# Patient Record
Sex: FEMALE | Race: BLACK OR AFRICAN AMERICAN | NOT HISPANIC OR LATINO | Employment: OTHER | URBAN - METROPOLITAN AREA
[De-identification: names, ages, dates, MRNs, and addresses within clinical notes are randomized per-mention and may not be internally consistent; named-entity substitution may affect disease eponyms.]

---

## 2020-10-05 ENCOUNTER — OFFICE VISIT (OUTPATIENT)
Dept: CARDIOLOGY | Facility: CLINIC | Age: 60
End: 2020-10-05
Payer: MEDICARE

## 2020-10-05 VITALS
HEART RATE: 81 BPM | SYSTOLIC BLOOD PRESSURE: 154 MMHG | OXYGEN SATURATION: 99 % | WEIGHT: 156 LBS | DIASTOLIC BLOOD PRESSURE: 76 MMHG

## 2020-10-05 DIAGNOSIS — E78.00 HYPERCHOLESTEROLEMIA: ICD-10-CM

## 2020-10-05 DIAGNOSIS — I65.22 STENOSIS OF LEFT CAROTID ARTERY: Primary | ICD-10-CM

## 2020-10-05 DIAGNOSIS — E11.59 TYPE 2 DIABETES MELLITUS WITH OTHER CIRCULATORY COMPLICATION, WITHOUT LONG-TERM CURRENT USE OF INSULIN: ICD-10-CM

## 2020-10-05 DIAGNOSIS — I10 ESSENTIAL HYPERTENSION: ICD-10-CM

## 2020-10-05 DIAGNOSIS — I25.118 ATHEROSCLEROSIS OF NATIVE CORONARY ARTERY OF NATIVE HEART WITH STABLE ANGINA PECTORIS: ICD-10-CM

## 2020-10-05 DIAGNOSIS — I70.209 ATHEROSCLEROTIC PERIPHERAL VASCULAR DISEASE: ICD-10-CM

## 2020-10-05 PROCEDURE — 99999 PR PBB SHADOW E&M-NEW PATIENT-LVL III: ICD-10-PCS | Mod: PBBFAC,,, | Performed by: INTERNAL MEDICINE

## 2020-10-05 PROCEDURE — 99214 PR OFFICE/OUTPT VISIT, EST, LEVL IV, 30-39 MIN: ICD-10-PCS | Mod: S$GLB,,, | Performed by: INTERNAL MEDICINE

## 2020-10-05 PROCEDURE — 99214 OFFICE O/P EST MOD 30 MIN: CPT | Mod: S$GLB,,, | Performed by: INTERNAL MEDICINE

## 2020-10-05 PROCEDURE — 99999 PR PBB SHADOW E&M-NEW PATIENT-LVL III: CPT | Mod: PBBFAC,,, | Performed by: INTERNAL MEDICINE

## 2020-10-05 RX ORDER — ASPIRIN 81 MG/1
81 TABLET ORAL DAILY
COMMUNITY

## 2020-10-05 RX ORDER — ZOLPIDEM TARTRATE 10 MG/1
10 TABLET ORAL DAILY
COMMUNITY
Start: 2020-09-23

## 2020-10-05 RX ORDER — LOSARTAN POTASSIUM AND HYDROCHLOROTHIAZIDE 12.5; 1 MG/1; MG/1
1 TABLET ORAL DAILY
COMMUNITY
Start: 2020-09-22

## 2020-10-05 RX ORDER — TIZANIDINE 4 MG/1
4 TABLET ORAL DAILY PRN
COMMUNITY
End: 2023-04-21

## 2020-10-05 RX ORDER — OXYCODONE AND ACETAMINOPHEN 10; 325 MG/1; MG/1
TABLET ORAL DAILY PRN
COMMUNITY
Start: 2020-09-25

## 2020-10-05 RX ORDER — ATORVASTATIN CALCIUM 40 MG/1
40 TABLET, FILM COATED ORAL DAILY
COMMUNITY
Start: 2019-11-22 | End: 2020-10-05 | Stop reason: SDUPTHER

## 2020-10-05 RX ORDER — ATORVASTATIN CALCIUM 40 MG/1
40 TABLET, FILM COATED ORAL DAILY
Qty: 90 TABLET | Refills: 3 | Status: SHIPPED | OUTPATIENT
Start: 2020-10-05 | End: 2022-10-07

## 2020-10-05 NOTE — PROGRESS NOTES
[facility]    Chief Complaint  Chief Complaint   Patient presents with    Coronary Artery Disease    Peripheral Arterial Disease       HPI:    Smoking again.  Not taking atorvastatin regularly.  But an stationary bicycle which she is using 3 or 4 days per week.  No angina.  No claudication.  Reports continued intolerance of atorvastatin because it makes her feel fatigued.    Medications  Current Outpatient Medications   Medication Sig Dispense Refill    aspirin (ECOTRIN) 81 MG EC tablet Take 81 mg by mouth once daily.      atorvastatin (LIPITOR) 40 MG tablet Take 40 mg by mouth once daily.      losartan-hydrochlorothiazide 100-12.5 mg (HYZAAR) 100-12.5 mg Tab Take 1 tablet by mouth once daily.      oxyCODONE-acetaminophen (PERCOCET)  mg per tablet daily as needed.      tiZANidine (ZANAFLEX) 4 MG tablet Take 4 mg by mouth daily as needed.      zolpidem (AMBIEN) 10 mg Tab Take 10 mg by mouth once daily.       No current facility-administered medications for this visit.       Prior to Admission medications    Medication Sig Start Date End Date Taking? Authorizing Provider   aspirin (ECOTRIN) 81 MG EC tablet Take 81 mg by mouth once daily.   Yes Historical Provider   atorvastatin (LIPITOR) 40 MG tablet Take 40 mg by mouth once daily. 11/22/19  Yes Historical Provider   losartan-hydrochlorothiazide 100-12.5 mg (HYZAAR) 100-12.5 mg Tab Take 1 tablet by mouth once daily. 9/22/20  Yes Historical Provider   oxyCODONE-acetaminophen (PERCOCET)  mg per tablet daily as needed. 9/25/20  Yes Historical Provider   tiZANidine (ZANAFLEX) 4 MG tablet Take 4 mg by mouth daily as needed.   Yes Historical Provider   zolpidem (AMBIEN) 10 mg Tab Take 10 mg by mouth once daily. 9/23/20  Yes Historical Provider       History  Past Medical History:   Diagnosis Date    Atherosclerotic peripheral vascular disease     Carotid stenosis     Coronary atherosclerosis     RCA-    Diabetes mellitus, type 2      Diverticulitis large intestine 05/2014    Hypercholesterolemia     Hypertension      Past Surgical History:   Procedure Laterality Date    ILIAC ARTERY STENT Left 02/24/2010    Maria T 8 x 29 mm    ILIAC ARTERY STENT Right 02/17/2010    Maria T 8 x 60 mm    TUBAL LIGATION      TYMPANOTOMY  11/2018     Social History     Socioeconomic History    Marital status:      Spouse name: Not on file    Number of children: Not on file    Years of education: Not on file    Highest education level: Not on file   Occupational History    Not on file   Social Needs    Financial resource strain: Not on file    Food insecurity     Worry: Not on file     Inability: Not on file    Transportation needs     Medical: Not on file     Non-medical: Not on file   Tobacco Use    Smoking status: Current Every Day Smoker    Smokeless tobacco: Never Used   Substance and Sexual Activity    Alcohol use: Yes    Drug use: Not on file    Sexual activity: Not on file   Lifestyle    Physical activity     Days per week: Not on file     Minutes per session: Not on file    Stress: Not on file   Relationships    Social connections     Talks on phone: Not on file     Gets together: Not on file     Attends Spiritism service: Not on file     Active member of club or organization: Not on file     Attends meetings of clubs or organizations: Not on file     Relationship status: Not on file   Other Topics Concern    Not on file   Social History Narrative    Not on file       Allergies  Review of patient's allergies indicates:  No Known Allergies    Review of Systems   Review of Systems   Constitution: Negative for malaise/fatigue, weight gain and weight loss.   Eyes: Negative for visual disturbance.   Cardiovascular: Negative for chest pain, claudication, cyanosis, dyspnea on exertion, irregular heartbeat, leg swelling, near-syncope, orthopnea, palpitations, paroxysmal nocturnal dyspnea and syncope.   Respiratory: Negative for  cough, hemoptysis, shortness of breath, sleep disturbances due to breathing and wheezing.    Hematologic/Lymphatic: Negative for bleeding problem. Does not bruise/bleed easily.   Skin: Negative for poor wound healing.   Musculoskeletal: Positive for back pain. Negative for muscle cramps and myalgias.   Gastrointestinal: Negative for abdominal pain, anorexia, diarrhea, heartburn, hematemesis, hematochezia, melena, nausea and vomiting.   Genitourinary: Negative for hematuria and nocturia.   Neurological: Negative for excessive daytime sleepiness, dizziness, focal weakness, light-headedness and weakness.       Physical Exam  Vitals:    10/05/20 1135   BP: (!) 154/76   Pulse: 81     Wt Readings from Last 1 Encounters:   10/05/20 70.8 kg (156 lb)     Physical Exam   Constitutional: She is oriented to person, place, and time. She is cooperative.  Non-toxic appearance. No distress.   HENT:   Head: Normocephalic and atraumatic.   Eyes: Conjunctivae are normal. No scleral icterus.   Neck: Neck supple. No hepatojugular reflux and no JVD present. No tracheal deviation present. No thyromegaly present.   Cardiovascular: Normal rate, regular rhythm, S1 normal and S2 normal.  No extrasystoles are present. PMI is not displaced. Exam reveals gallop and S4. Exam reveals no S3.   No murmur heard.  Pulses:       Carotid pulses are 2+ on the right side and 2+ on the left side with bruit.       Radial pulses are 2+ on the right side and 2+ on the left side.        Dorsalis pedis pulses are 1+ on the right side and 1+ on the left side.        Posterior tibial pulses are 1+ on the right side and 1+ on the left side.   Pulmonary/Chest: No accessory muscle usage. No respiratory distress. She has no decreased breath sounds. She has no wheezes. She has no rhonchi. She has no rales.   Abdominal: Soft. Bowel sounds are normal. She exhibits abdominal bruit. She exhibits no pulsatile liver and no pulsatile midline mass. There is no splenomegaly or  hepatomegaly. There is no abdominal tenderness.   Musculoskeletal:         General: No tenderness, deformity or edema.   Neurological: She is alert and oriented to person, place, and time. She has normal strength. No cranial nerve deficit or sensory deficit.   Skin: Skin is warm, dry and intact. She is not diaphoretic. No cyanosis. No pallor. Nails show no clubbing.   Psychiatric: She has a normal mood and affect. Her speech is normal and behavior is normal.       Labs  No visits with results within 6 Month(s) from this visit.   Latest known visit with results is:   No results found for any previous visit.       Imaging  No results found.    Assessment  1. Stenosis of left carotid artery  Asymptomatic  - CV Ultrasound Bilateral Doppler Carotid; Future    2. Atherosclerosis of native coronary artery of native heart with stable angina pectoris  Stable on medical therapy    3. Atherosclerotic peripheral vascular disease  Tolerable claudication    4. Essential hypertension  Controlled    5. Hypercholesterolemia  Has blood work ordered soon    6. Type 2 diabetes mellitus with other circulatory complication, without long-term current use of insulin  Management per primary      Plan and Discussion    Continue current guideline directed medical therapy.  Importance of smoking cessation was reinforced.  I asked her to take atorvastatin regular so that we could decide about adding alternative agent such as PCSK9 inhibitor.      Follow Up  Follow up in about 6 months (around 4/5/2021).      Maurizio Kraus MD

## 2021-01-15 ENCOUNTER — TELEPHONE (OUTPATIENT)
Dept: ADMINISTRATIVE | Facility: CLINIC | Age: 61
End: 2021-01-15

## 2021-01-15 ENCOUNTER — OFFICE VISIT (OUTPATIENT)
Dept: URGENT CARE | Facility: CLINIC | Age: 61
End: 2021-01-15
Payer: MEDICARE

## 2021-01-15 VITALS
OXYGEN SATURATION: 97 % | HEART RATE: 102 BPM | SYSTOLIC BLOOD PRESSURE: 148 MMHG | RESPIRATION RATE: 16 BRPM | TEMPERATURE: 98 F | DIASTOLIC BLOOD PRESSURE: 73 MMHG | WEIGHT: 156 LBS

## 2021-01-15 DIAGNOSIS — I10 HYPERTENSION, UNSPECIFIED TYPE: ICD-10-CM

## 2021-01-15 DIAGNOSIS — R43.0 LOSS OF SMELL: ICD-10-CM

## 2021-01-15 DIAGNOSIS — U07.1 COVID-19: Primary | ICD-10-CM

## 2021-01-15 LAB
CTP QC/QA: YES
SARS-COV-2 RDRP RESP QL NAA+PROBE: POSITIVE

## 2021-01-15 PROCEDURE — U0002: ICD-10-PCS | Mod: QW,S$GLB,, | Performed by: NURSE PRACTITIONER

## 2021-01-15 PROCEDURE — 99204 OFFICE O/P NEW MOD 45 MIN: CPT | Mod: S$GLB,,, | Performed by: NURSE PRACTITIONER

## 2021-01-15 PROCEDURE — U0002 COVID-19 LAB TEST NON-CDC: HCPCS | Mod: QW,S$GLB,, | Performed by: NURSE PRACTITIONER

## 2021-01-15 PROCEDURE — 99204 PR OFFICE/OUTPT VISIT, NEW, LEVL IV, 45-59 MIN: ICD-10-PCS | Mod: S$GLB,,, | Performed by: NURSE PRACTITIONER

## 2021-01-16 ENCOUNTER — TELEPHONE (OUTPATIENT)
Dept: ADMINISTRATIVE | Facility: CLINIC | Age: 61
End: 2021-01-16

## 2021-01-16 ENCOUNTER — PATIENT MESSAGE (OUTPATIENT)
Dept: ADMINISTRATIVE | Facility: OTHER | Age: 61
End: 2021-01-16

## 2021-01-17 ENCOUNTER — PATIENT MESSAGE (OUTPATIENT)
Dept: ADMINISTRATIVE | Facility: OTHER | Age: 61
End: 2021-01-17

## 2021-01-18 ENCOUNTER — PATIENT MESSAGE (OUTPATIENT)
Dept: ADMINISTRATIVE | Facility: OTHER | Age: 61
End: 2021-01-18

## 2021-01-19 ENCOUNTER — NURSE TRIAGE (OUTPATIENT)
Dept: ADMINISTRATIVE | Facility: CLINIC | Age: 61
End: 2021-01-19

## 2021-01-19 ENCOUNTER — PATIENT MESSAGE (OUTPATIENT)
Dept: ADMINISTRATIVE | Facility: OTHER | Age: 61
End: 2021-01-19

## 2021-01-20 ENCOUNTER — PATIENT MESSAGE (OUTPATIENT)
Dept: ADMINISTRATIVE | Facility: OTHER | Age: 61
End: 2021-01-20

## 2021-01-21 ENCOUNTER — PATIENT MESSAGE (OUTPATIENT)
Dept: ADMINISTRATIVE | Facility: OTHER | Age: 61
End: 2021-01-21

## 2021-01-22 ENCOUNTER — PATIENT MESSAGE (OUTPATIENT)
Dept: ADMINISTRATIVE | Facility: OTHER | Age: 61
End: 2021-01-22

## 2021-01-23 ENCOUNTER — NURSE TRIAGE (OUTPATIENT)
Dept: ADMINISTRATIVE | Facility: CLINIC | Age: 61
End: 2021-01-23

## 2021-01-23 ENCOUNTER — PATIENT MESSAGE (OUTPATIENT)
Dept: ADMINISTRATIVE | Facility: OTHER | Age: 61
End: 2021-01-23

## 2021-01-24 ENCOUNTER — PATIENT MESSAGE (OUTPATIENT)
Dept: ADMINISTRATIVE | Facility: OTHER | Age: 61
End: 2021-01-24

## 2021-01-25 ENCOUNTER — PATIENT MESSAGE (OUTPATIENT)
Dept: ADMINISTRATIVE | Facility: OTHER | Age: 61
End: 2021-01-25

## 2021-01-26 ENCOUNTER — PATIENT MESSAGE (OUTPATIENT)
Dept: ADMINISTRATIVE | Facility: OTHER | Age: 61
End: 2021-01-26

## 2021-01-27 ENCOUNTER — PATIENT MESSAGE (OUTPATIENT)
Dept: ADMINISTRATIVE | Facility: OTHER | Age: 61
End: 2021-01-27

## 2021-01-28 ENCOUNTER — PATIENT MESSAGE (OUTPATIENT)
Dept: ADMINISTRATIVE | Facility: OTHER | Age: 61
End: 2021-01-28

## 2021-01-29 ENCOUNTER — PATIENT MESSAGE (OUTPATIENT)
Dept: ADMINISTRATIVE | Facility: OTHER | Age: 61
End: 2021-01-29

## 2021-04-08 ENCOUNTER — OUTSIDE PLACE OF SERVICE (OUTPATIENT)
Dept: CARDIOLOGY | Facility: CLINIC | Age: 61
End: 2021-04-08
Payer: MEDICARE

## 2021-04-08 PROCEDURE — 93880 PR DUPLEX SCAN EXTRACRANIAL,BILAT: ICD-10-PCS | Mod: 26,,, | Performed by: INTERNAL MEDICINE

## 2021-04-08 PROCEDURE — 93880 EXTRACRANIAL BILAT STUDY: CPT | Mod: 26,,, | Performed by: INTERNAL MEDICINE

## 2021-04-13 ENCOUNTER — OFFICE VISIT (OUTPATIENT)
Dept: CARDIOLOGY | Facility: CLINIC | Age: 61
End: 2021-04-13
Payer: MEDICARE

## 2021-04-13 VITALS
SYSTOLIC BLOOD PRESSURE: 138 MMHG | WEIGHT: 160.5 LBS | DIASTOLIC BLOOD PRESSURE: 70 MMHG | HEART RATE: 91 BPM | OXYGEN SATURATION: 98 %

## 2021-04-13 DIAGNOSIS — I70.209 ATHEROSCLEROTIC PERIPHERAL VASCULAR DISEASE: ICD-10-CM

## 2021-04-13 DIAGNOSIS — I25.118 ATHEROSCLEROSIS OF NATIVE CORONARY ARTERY OF NATIVE HEART WITH STABLE ANGINA PECTORIS: ICD-10-CM

## 2021-04-13 DIAGNOSIS — I10 ESSENTIAL HYPERTENSION: ICD-10-CM

## 2021-04-13 DIAGNOSIS — I65.22 STENOSIS OF LEFT CAROTID ARTERY: ICD-10-CM

## 2021-04-13 DIAGNOSIS — E78.00 HYPERCHOLESTEROLEMIA: Primary | ICD-10-CM

## 2021-04-13 DIAGNOSIS — E11.59 TYPE 2 DIABETES MELLITUS WITH OTHER CIRCULATORY COMPLICATION, WITHOUT LONG-TERM CURRENT USE OF INSULIN: ICD-10-CM

## 2021-04-13 PROCEDURE — 99214 OFFICE O/P EST MOD 30 MIN: CPT | Mod: S$GLB,,, | Performed by: INTERNAL MEDICINE

## 2021-04-13 PROCEDURE — 1126F AMNT PAIN NOTED NONE PRSNT: CPT | Mod: S$GLB,,, | Performed by: INTERNAL MEDICINE

## 2021-04-13 PROCEDURE — 99999 PR PBB SHADOW E&M-EST. PATIENT-LVL III: CPT | Mod: PBBFAC,,, | Performed by: INTERNAL MEDICINE

## 2021-04-13 PROCEDURE — 1126F PR PAIN SEVERITY QUANTIFIED, NO PAIN PRESENT: ICD-10-PCS | Mod: S$GLB,,, | Performed by: INTERNAL MEDICINE

## 2021-04-13 PROCEDURE — 99999 PR PBB SHADOW E&M-EST. PATIENT-LVL III: ICD-10-PCS | Mod: PBBFAC,,, | Performed by: INTERNAL MEDICINE

## 2021-04-13 PROCEDURE — 99214 PR OFFICE/OUTPT VISIT, EST, LEVL IV, 30-39 MIN: ICD-10-PCS | Mod: S$GLB,,, | Performed by: INTERNAL MEDICINE

## 2021-04-13 RX ORDER — EZETIMIBE 10 MG/1
10 TABLET ORAL DAILY
Qty: 90 TABLET | Refills: 3 | Status: SHIPPED | OUTPATIENT
Start: 2021-04-13 | End: 2022-06-15

## 2021-04-26 ENCOUNTER — PATIENT MESSAGE (OUTPATIENT)
Dept: RESEARCH | Facility: HOSPITAL | Age: 61
End: 2021-04-26

## 2021-10-15 ENCOUNTER — OFFICE VISIT (OUTPATIENT)
Dept: CARDIOLOGY | Facility: CLINIC | Age: 61
End: 2021-10-15
Payer: MEDICARE

## 2021-10-15 ENCOUNTER — SPECIALTY PHARMACY (OUTPATIENT)
Dept: PHARMACY | Facility: CLINIC | Age: 61
End: 2021-10-15
Payer: MEDICARE

## 2021-10-15 VITALS
SYSTOLIC BLOOD PRESSURE: 118 MMHG | HEART RATE: 86 BPM | OXYGEN SATURATION: 97 % | DIASTOLIC BLOOD PRESSURE: 60 MMHG | WEIGHT: 157 LBS

## 2021-10-15 DIAGNOSIS — I70.209 ATHEROSCLEROTIC PERIPHERAL VASCULAR DISEASE: ICD-10-CM

## 2021-10-15 DIAGNOSIS — E78.00 HYPERCHOLESTEROLEMIA: ICD-10-CM

## 2021-10-15 DIAGNOSIS — E11.59 TYPE 2 DIABETES MELLITUS WITH OTHER CIRCULATORY COMPLICATION, WITHOUT LONG-TERM CURRENT USE OF INSULIN: ICD-10-CM

## 2021-10-15 DIAGNOSIS — I25.118 ATHEROSCLEROSIS OF NATIVE CORONARY ARTERY OF NATIVE HEART WITH STABLE ANGINA PECTORIS: ICD-10-CM

## 2021-10-15 DIAGNOSIS — I65.22 STENOSIS OF LEFT CAROTID ARTERY: Primary | ICD-10-CM

## 2021-10-15 DIAGNOSIS — I10 ESSENTIAL HYPERTENSION: ICD-10-CM

## 2021-10-15 PROCEDURE — 99999 PR PBB SHADOW E&M-EST. PATIENT-LVL III: ICD-10-PCS | Mod: PBBFAC,,, | Performed by: INTERNAL MEDICINE

## 2021-10-15 PROCEDURE — 99214 OFFICE O/P EST MOD 30 MIN: CPT | Mod: S$GLB,,, | Performed by: INTERNAL MEDICINE

## 2021-10-15 PROCEDURE — 3078F PR MOST RECENT DIASTOLIC BLOOD PRESSURE < 80 MM HG: ICD-10-PCS | Mod: CPTII,S$GLB,, | Performed by: INTERNAL MEDICINE

## 2021-10-15 PROCEDURE — 1160F RVW MEDS BY RX/DR IN RCRD: CPT | Mod: CPTII,S$GLB,, | Performed by: INTERNAL MEDICINE

## 2021-10-15 PROCEDURE — 3074F SYST BP LT 130 MM HG: CPT | Mod: CPTII,S$GLB,, | Performed by: INTERNAL MEDICINE

## 2021-10-15 PROCEDURE — 1159F PR MEDICATION LIST DOCUMENTED IN MEDICAL RECORD: ICD-10-PCS | Mod: CPTII,S$GLB,, | Performed by: INTERNAL MEDICINE

## 2021-10-15 PROCEDURE — 1160F PR REVIEW ALL MEDS BY PRESCRIBER/CLIN PHARMACIST DOCUMENTED: ICD-10-PCS | Mod: CPTII,S$GLB,, | Performed by: INTERNAL MEDICINE

## 2021-10-15 PROCEDURE — 99214 PR OFFICE/OUTPT VISIT, EST, LEVL IV, 30-39 MIN: ICD-10-PCS | Mod: S$GLB,,, | Performed by: INTERNAL MEDICINE

## 2021-10-15 PROCEDURE — 99999 PR PBB SHADOW E&M-EST. PATIENT-LVL III: CPT | Mod: PBBFAC,,, | Performed by: INTERNAL MEDICINE

## 2021-10-15 PROCEDURE — 3074F PR MOST RECENT SYSTOLIC BLOOD PRESSURE < 130 MM HG: ICD-10-PCS | Mod: CPTII,S$GLB,, | Performed by: INTERNAL MEDICINE

## 2021-10-15 PROCEDURE — 1159F MED LIST DOCD IN RCRD: CPT | Mod: CPTII,S$GLB,, | Performed by: INTERNAL MEDICINE

## 2021-10-15 PROCEDURE — 3078F DIAST BP <80 MM HG: CPT | Mod: CPTII,S$GLB,, | Performed by: INTERNAL MEDICINE

## 2021-10-20 ENCOUNTER — TELEPHONE (OUTPATIENT)
Dept: CARDIOLOGY | Facility: CLINIC | Age: 61
End: 2021-10-20

## 2021-10-25 ENCOUNTER — TELEPHONE (OUTPATIENT)
Dept: CARDIOLOGY | Facility: CLINIC | Age: 61
End: 2021-10-25
Payer: MEDICARE

## 2021-10-25 ENCOUNTER — CLINICAL SUPPORT (OUTPATIENT)
Dept: SMOKING CESSATION | Facility: CLINIC | Age: 61
End: 2021-10-25
Payer: COMMERCIAL

## 2021-10-25 DIAGNOSIS — F17.210 LIGHT CIGARETTE SMOKER (1-9 CIGS/DAY): Primary | ICD-10-CM

## 2021-10-25 PROCEDURE — 99404 PR PREVENT COUNSEL,INDIV,60 MIN: ICD-10-PCS | Mod: S$GLB,,,

## 2021-10-25 PROCEDURE — 99999 PR PBB SHADOW E&M-EST. PATIENT-LVL I: CPT | Mod: PBBFAC,,,

## 2021-10-25 PROCEDURE — 99999 PR PBB SHADOW E&M-EST. PATIENT-LVL I: ICD-10-PCS | Mod: PBBFAC,,,

## 2021-10-25 PROCEDURE — 99404 PREV MED CNSL INDIV APPRX 60: CPT | Mod: S$GLB,,,

## 2021-10-25 RX ORDER — CYCLOBENZAPRINE HCL 10 MG
10 TABLET ORAL 3 TIMES DAILY PRN
COMMUNITY
End: 2022-03-13

## 2021-10-25 RX ORDER — IBUPROFEN 200 MG
1 TABLET ORAL DAILY
Qty: 28 PATCH | Refills: 0 | Status: SHIPPED | OUTPATIENT
Start: 2021-10-25 | End: 2021-11-10 | Stop reason: SDUPTHER

## 2021-10-27 ENCOUNTER — CLINICAL SUPPORT (OUTPATIENT)
Dept: URGENT CARE | Facility: CLINIC | Age: 61
End: 2021-10-27
Payer: MEDICARE

## 2021-10-27 VITALS
HEART RATE: 91 BPM | BODY MASS INDEX: 27.82 KG/M2 | RESPIRATION RATE: 18 BRPM | OXYGEN SATURATION: 99 % | DIASTOLIC BLOOD PRESSURE: 60 MMHG | TEMPERATURE: 99 F | SYSTOLIC BLOOD PRESSURE: 128 MMHG | HEIGHT: 63 IN | WEIGHT: 157 LBS

## 2021-10-27 DIAGNOSIS — R35.0 URINARY FREQUENCY: ICD-10-CM

## 2021-10-27 DIAGNOSIS — N30.01 ACUTE CYSTITIS WITH HEMATURIA: Primary | ICD-10-CM

## 2021-10-27 DIAGNOSIS — R39.89 SENSATION OF PRESSURE IN BLADDER AREA: ICD-10-CM

## 2021-10-27 LAB
BILIRUB UR QL STRIP: NEGATIVE
GLUCOSE UR QL STRIP: NEGATIVE
KETONES UR QL STRIP: NEGATIVE
LEUKOCYTE ESTERASE UR QL STRIP: POSITIVE
PH, POC UA: 5.5 (ref 5–8)
POC BLOOD, URINE: POSITIVE
POC NITRATES, URINE: NEGATIVE
PROT UR QL STRIP: NEGATIVE
SP GR UR STRIP: 1.01 (ref 1–1.03)
UROBILINOGEN UR STRIP-ACNC: NORMAL (ref 0.1–1.1)

## 2021-10-27 PROCEDURE — 99213 PR OFFICE/OUTPT VISIT, EST, LEVL III, 20-29 MIN: ICD-10-PCS | Mod: 25,S$GLB,, | Performed by: NURSE PRACTITIONER

## 2021-10-27 PROCEDURE — 81003 POCT URINALYSIS, DIPSTICK, AUTOMATED, W/O SCOPE: ICD-10-PCS | Mod: QW,S$GLB,, | Performed by: NURSE PRACTITIONER

## 2021-10-27 PROCEDURE — 87077 CULTURE AEROBIC IDENTIFY: CPT | Performed by: NURSE PRACTITIONER

## 2021-10-27 PROCEDURE — 81003 URINALYSIS AUTO W/O SCOPE: CPT | Mod: QW,S$GLB,, | Performed by: NURSE PRACTITIONER

## 2021-10-27 PROCEDURE — 87088 URINE BACTERIA CULTURE: CPT | Performed by: NURSE PRACTITIONER

## 2021-10-27 PROCEDURE — 87086 URINE CULTURE/COLONY COUNT: CPT | Performed by: NURSE PRACTITIONER

## 2021-10-27 PROCEDURE — 87186 SC STD MICRODIL/AGAR DIL: CPT | Performed by: NURSE PRACTITIONER

## 2021-10-27 PROCEDURE — 99213 OFFICE O/P EST LOW 20 MIN: CPT | Mod: 25,S$GLB,, | Performed by: NURSE PRACTITIONER

## 2021-10-27 RX ORDER — PHENAZOPYRIDINE HYDROCHLORIDE 200 MG/1
200 TABLET, FILM COATED ORAL 3 TIMES DAILY PRN
Qty: 9 TABLET | Refills: 0 | Status: SHIPPED | OUTPATIENT
Start: 2021-10-27 | End: 2021-10-30

## 2021-10-27 RX ORDER — SULFAMETHOXAZOLE AND TRIMETHOPRIM 800; 160 MG/1; MG/1
1 TABLET ORAL 2 TIMES DAILY
Qty: 14 TABLET | Refills: 0 | Status: SHIPPED | OUTPATIENT
Start: 2021-10-27 | End: 2021-10-29

## 2021-10-29 ENCOUNTER — SPECIALTY PHARMACY (OUTPATIENT)
Dept: PHARMACY | Facility: CLINIC | Age: 61
End: 2021-10-29
Payer: MEDICARE

## 2021-10-29 ENCOUNTER — TELEPHONE (OUTPATIENT)
Dept: URGENT CARE | Facility: CLINIC | Age: 61
End: 2021-10-29
Payer: MEDICARE

## 2021-10-29 DIAGNOSIS — N30.00 ACUTE CYSTITIS WITHOUT HEMATURIA: Primary | ICD-10-CM

## 2021-10-29 LAB — BACTERIA UR CULT: ABNORMAL

## 2021-10-29 RX ORDER — NITROFURANTOIN 25; 75 MG/1; MG/1
100 CAPSULE ORAL 2 TIMES DAILY
Qty: 14 CAPSULE | Refills: 0 | Status: SHIPPED | OUTPATIENT
Start: 2021-10-29 | End: 2021-11-05

## 2021-10-30 ENCOUNTER — TELEPHONE (OUTPATIENT)
Dept: URGENT CARE | Facility: CLINIC | Age: 61
End: 2021-10-30
Payer: MEDICARE

## 2021-11-02 ENCOUNTER — TELEPHONE (OUTPATIENT)
Dept: CARDIOLOGY | Facility: CLINIC | Age: 61
End: 2021-11-02
Payer: MEDICARE

## 2021-11-03 ENCOUNTER — CLINICAL SUPPORT (OUTPATIENT)
Dept: SMOKING CESSATION | Facility: CLINIC | Age: 61
End: 2021-11-03
Payer: COMMERCIAL

## 2021-11-03 DIAGNOSIS — F17.210 CIGARETTE NICOTINE DEPENDENCE, UNCOMPLICATED: Primary | ICD-10-CM

## 2021-11-03 PROCEDURE — 99999 PR PBB SHADOW E&M-EST. PATIENT-LVL I: ICD-10-PCS | Mod: PBBFAC,,,

## 2021-11-03 PROCEDURE — 99407 BEHAV CHNG SMOKING > 10 MIN: CPT | Mod: S$GLB,,,

## 2021-11-03 PROCEDURE — 99407 PR TOBACCO USE CESSATION INTENSIVE >10 MINUTES: ICD-10-PCS | Mod: S$GLB,,,

## 2021-11-03 PROCEDURE — 99999 PR PBB SHADOW E&M-EST. PATIENT-LVL I: CPT | Mod: PBBFAC,,,

## 2021-11-10 ENCOUNTER — CLINICAL SUPPORT (OUTPATIENT)
Dept: SMOKING CESSATION | Facility: CLINIC | Age: 61
End: 2021-11-10
Payer: COMMERCIAL

## 2021-11-10 DIAGNOSIS — F17.210 LIGHT CIGARETTE SMOKER (1-9 CIGS/DAY): Primary | ICD-10-CM

## 2021-11-10 PROCEDURE — 99404 PR PREVENT COUNSEL,INDIV,60 MIN: ICD-10-PCS | Mod: S$GLB,,,

## 2021-11-10 PROCEDURE — 99404 PREV MED CNSL INDIV APPRX 60: CPT | Mod: S$GLB,,,

## 2021-11-10 RX ORDER — IBUPROFEN 200 MG
1 TABLET ORAL DAILY
Qty: 28 PATCH | Refills: 0 | Status: SHIPPED | OUTPATIENT
Start: 2021-11-10 | End: 2021-12-08 | Stop reason: SDUPTHER

## 2021-11-10 RX ORDER — MICONAZOLE NITRATE 2 %
2 CREAM (GRAM) TOPICAL
Qty: 170 EACH | Refills: 0 | Status: SHIPPED | OUTPATIENT
Start: 2021-11-10 | End: 2021-12-08 | Stop reason: SDUPTHER

## 2021-11-17 ENCOUNTER — CLINICAL SUPPORT (OUTPATIENT)
Dept: SMOKING CESSATION | Facility: CLINIC | Age: 61
End: 2021-11-17
Payer: COMMERCIAL

## 2021-11-17 ENCOUNTER — SPECIALTY PHARMACY (OUTPATIENT)
Dept: PHARMACY | Facility: CLINIC | Age: 61
End: 2021-11-17
Payer: MEDICARE

## 2021-11-17 DIAGNOSIS — F17.210 LIGHT CIGARETTE SMOKER (1-9 CIGS/DAY): Primary | ICD-10-CM

## 2021-11-17 DIAGNOSIS — E78.00 HYPERCHOLESTEROLEMIA: ICD-10-CM

## 2021-11-17 PROCEDURE — 99402 PREV MED CNSL INDIV APPRX 30: CPT | Mod: 25,S$GLB,,

## 2021-11-17 PROCEDURE — 99402 PR PREVENT COUNSEL,INDIV,30 MIN: ICD-10-PCS | Mod: 25,S$GLB,,

## 2021-11-17 RX ORDER — EVOLOCUMAB 140 MG/ML
140 INJECTION, SOLUTION SUBCUTANEOUS
Qty: 2 ML | Refills: 11 | Status: SHIPPED | OUTPATIENT
Start: 2021-11-17 | End: 2022-10-28 | Stop reason: SDUPTHER

## 2021-12-01 ENCOUNTER — TELEPHONE (OUTPATIENT)
Dept: SMOKING CESSATION | Facility: CLINIC | Age: 61
End: 2021-12-01
Payer: MEDICARE

## 2021-12-06 ENCOUNTER — TELEPHONE (OUTPATIENT)
Dept: SMOKING CESSATION | Facility: CLINIC | Age: 61
End: 2021-12-06
Payer: MEDICARE

## 2021-12-08 ENCOUNTER — TELEPHONE (OUTPATIENT)
Dept: SMOKING CESSATION | Facility: CLINIC | Age: 61
End: 2021-12-08
Payer: MEDICARE

## 2021-12-08 ENCOUNTER — CLINICAL SUPPORT (OUTPATIENT)
Dept: SMOKING CESSATION | Facility: CLINIC | Age: 61
End: 2021-12-08
Payer: COMMERCIAL

## 2021-12-08 DIAGNOSIS — F17.210 LIGHT CIGARETTE SMOKER (1-9 CIGS/DAY): Primary | ICD-10-CM

## 2021-12-08 PROCEDURE — 99402 PR PREVENT COUNSEL,INDIV,30 MIN: ICD-10-PCS | Mod: S$GLB,,,

## 2021-12-08 PROCEDURE — 99402 PREV MED CNSL INDIV APPRX 30: CPT | Mod: S$GLB,,,

## 2021-12-08 RX ORDER — MICONAZOLE NITRATE 2 %
2 CREAM (GRAM) TOPICAL
Qty: 170 EACH | Refills: 0 | Status: SHIPPED | OUTPATIENT
Start: 2021-12-08 | End: 2022-10-07

## 2021-12-08 RX ORDER — IBUPROFEN 200 MG
1 TABLET ORAL DAILY
Qty: 28 PATCH | Refills: 0 | Status: SHIPPED | OUTPATIENT
Start: 2021-12-08 | End: 2021-12-23 | Stop reason: SDUPTHER

## 2021-12-22 ENCOUNTER — SPECIALTY PHARMACY (OUTPATIENT)
Dept: PHARMACY | Facility: CLINIC | Age: 61
End: 2021-12-22
Payer: MEDICARE

## 2021-12-22 ENCOUNTER — CLINICAL SUPPORT (OUTPATIENT)
Dept: SMOKING CESSATION | Facility: CLINIC | Age: 61
End: 2021-12-22
Payer: COMMERCIAL

## 2021-12-22 DIAGNOSIS — F17.210 LIGHT CIGARETTE SMOKER (1-9 CIGS/DAY): Primary | ICD-10-CM

## 2021-12-22 PROCEDURE — 99403 PREV MED CNSL INDIV APPRX 45: CPT | Mod: S$GLB,,,

## 2021-12-22 PROCEDURE — 99403 PR PREVENT COUNSEL,INDIV,45 MIN: ICD-10-PCS | Mod: S$GLB,,,

## 2021-12-23 RX ORDER — IBUPROFEN 200 MG
1 TABLET ORAL DAILY
Qty: 28 PATCH | Refills: 0 | Status: SHIPPED | OUTPATIENT
Start: 2021-12-23 | End: 2022-10-07

## 2021-12-29 ENCOUNTER — SPECIALTY PHARMACY (OUTPATIENT)
Dept: PHARMACY | Facility: CLINIC | Age: 61
End: 2021-12-29
Payer: MEDICARE

## 2022-01-03 ENCOUNTER — TELEPHONE (OUTPATIENT)
Dept: SMOKING CESSATION | Facility: CLINIC | Age: 62
End: 2022-01-03
Payer: MEDICARE

## 2022-01-10 ENCOUNTER — TELEPHONE (OUTPATIENT)
Dept: SMOKING CESSATION | Facility: CLINIC | Age: 62
End: 2022-01-10
Payer: MEDICARE

## 2022-01-11 ENCOUNTER — CLINICAL SUPPORT (OUTPATIENT)
Dept: SMOKING CESSATION | Facility: CLINIC | Age: 62
End: 2022-01-11
Payer: COMMERCIAL

## 2022-01-11 ENCOUNTER — OFFICE VISIT (OUTPATIENT)
Dept: URGENT CARE | Facility: CLINIC | Age: 62
End: 2022-01-11
Payer: MEDICARE

## 2022-01-11 VITALS
WEIGHT: 160 LBS | OXYGEN SATURATION: 98 % | BODY MASS INDEX: 28.35 KG/M2 | DIASTOLIC BLOOD PRESSURE: 60 MMHG | SYSTOLIC BLOOD PRESSURE: 150 MMHG | TEMPERATURE: 98 F | RESPIRATION RATE: 18 BRPM | HEART RATE: 93 BPM | HEIGHT: 63 IN

## 2022-01-11 DIAGNOSIS — R05.9 COUGH: ICD-10-CM

## 2022-01-11 DIAGNOSIS — L42 PITYRIASIS ROSEA: Primary | ICD-10-CM

## 2022-01-11 DIAGNOSIS — R21 RASH: ICD-10-CM

## 2022-01-11 DIAGNOSIS — F17.210 LIGHT CIGARETTE SMOKER (1-9 CIGS/DAY): Primary | ICD-10-CM

## 2022-01-11 PROCEDURE — 1159F PR MEDICATION LIST DOCUMENTED IN MEDICAL RECORD: ICD-10-PCS | Mod: CPTII,S$GLB,, | Performed by: NURSE PRACTITIONER

## 2022-01-11 PROCEDURE — 3008F PR BODY MASS INDEX (BMI) DOCUMENTED: ICD-10-PCS | Mod: CPTII,S$GLB,, | Performed by: NURSE PRACTITIONER

## 2022-01-11 PROCEDURE — 99407 PR TOBACCO USE CESSATION INTENSIVE >10 MINUTES: ICD-10-PCS | Mod: S$GLB,,,

## 2022-01-11 PROCEDURE — 3008F BODY MASS INDEX DOCD: CPT | Mod: CPTII,S$GLB,, | Performed by: NURSE PRACTITIONER

## 2022-01-11 PROCEDURE — 3078F PR MOST RECENT DIASTOLIC BLOOD PRESSURE < 80 MM HG: ICD-10-PCS | Mod: CPTII,S$GLB,, | Performed by: NURSE PRACTITIONER

## 2022-01-11 PROCEDURE — 3078F DIAST BP <80 MM HG: CPT | Mod: CPTII,S$GLB,, | Performed by: NURSE PRACTITIONER

## 2022-01-11 PROCEDURE — 1160F RVW MEDS BY RX/DR IN RCRD: CPT | Mod: CPTII,S$GLB,, | Performed by: NURSE PRACTITIONER

## 2022-01-11 PROCEDURE — 99213 PR OFFICE/OUTPT VISIT, EST, LEVL III, 20-29 MIN: ICD-10-PCS | Mod: S$GLB,,, | Performed by: NURSE PRACTITIONER

## 2022-01-11 PROCEDURE — 1159F MED LIST DOCD IN RCRD: CPT | Mod: CPTII,S$GLB,, | Performed by: NURSE PRACTITIONER

## 2022-01-11 PROCEDURE — 99213 OFFICE O/P EST LOW 20 MIN: CPT | Mod: S$GLB,,, | Performed by: NURSE PRACTITIONER

## 2022-01-11 PROCEDURE — 3077F SYST BP >= 140 MM HG: CPT | Mod: CPTII,S$GLB,, | Performed by: NURSE PRACTITIONER

## 2022-01-11 PROCEDURE — 1160F PR REVIEW ALL MEDS BY PRESCRIBER/CLIN PHARMACIST DOCUMENTED: ICD-10-PCS | Mod: CPTII,S$GLB,, | Performed by: NURSE PRACTITIONER

## 2022-01-11 PROCEDURE — 99407 BEHAV CHNG SMOKING > 10 MIN: CPT | Mod: S$GLB,,,

## 2022-01-11 PROCEDURE — 3077F PR MOST RECENT SYSTOLIC BLOOD PRESSURE >= 140 MM HG: ICD-10-PCS | Mod: CPTII,S$GLB,, | Performed by: NURSE PRACTITIONER

## 2022-01-11 RX ORDER — HYDROXYZINE PAMOATE 25 MG/1
25 CAPSULE ORAL 4 TIMES DAILY
Qty: 28 CAPSULE | Refills: 0 | Status: SHIPPED | OUTPATIENT
Start: 2022-01-11 | End: 2022-01-18

## 2022-01-11 NOTE — PATIENT INSTRUCTIONS
"Patient Education       Pityriasis Rosea   The Basics   Written by the doctors and editors at [facility]   What is pityriasis rosea? -- Pityriasis rosea is a harmless skin rash that causes small, itchy spots on the belly, back, chest, arms, and legs. The rash usually lasts about 4 to 6 weeks, but in some people, it can last for months.  Pityriasis rosea is most common in older children and young adults.  What causes pityriasis rosea? -- The cause is not known. But it does not seem to be easily spread from person to person.  What are the symptoms of pityriasis rosea? -- In many people, the rash starts with one round or oval patch. This spot is about the size of a half dollar but might be larger. A day or two later, many smaller spots about the size of a dime appear. But not everyone gets the large spot before the rest of the rash.  If you have light skin, the spots are usually pink or salmon-colored. If you have dark skin, the spots can be a red-brown color or darker than your skin and .  The spots might be:  · On the belly, back, chest, arms, and legs  · Spread out in a "fir tree" or "Maya tree" pattern on the back  · Itchy  · A little scaly  In children, the spots sometimes happen on the face and scalp.  Is there a test for pityriasis rosea? -- Maybe. Your doctor or nurse will often be able to tell if you have it by learning about your symptoms and doing an exam. But they might gently scrape the rash or do a different test to get a sample of your skin. Tests on the skin sample can help the doctor tell if you have pityriasis rosea or a different disease.  Is there anything I can do on my own to feel better? -- Yes. You can:  · Take a special kind of bath called an oatmeal bath. Use lukewarm, not hot water.  · Use unscented moisturizing lotion or cream on your skin.  · Try to keep your body cool.  How is pityriasis rosea treated? -- Most people do not need any treatment. If the symptoms bother you, your doctor " "might prescribe creams or ointments to help with itching. In rare cases, doctors prescribe other medicines or a special type of treatment that uses lights, called "phototherapy."  All topics are updated as new evidence becomes available and our peer review process is complete.  This topic retrieved from [employer] on: Sep 21, 2021.  Topic 07274 Version 8.0  Release: 29.4.2 - C29.263  © 2021 UpToDate, Inc. and/or its affiliates. All rights reserved.  picture 1: Pityriasis rosea     Pityriasis rosea is a skin rash that sometimes starts with a single large patch (see arrow). Many smaller spots appear within a few days. In people with light skin, the spots are pink or salmon-colored. In people with dark skin, the spots are a red-brown color or darker than their skin.  (A) Courtesy of Vazquez Springer MD. (B) Reproduced with permission from: Mejia [employer]TS. Mejia's Photoguide of Common Skin Disorders, 2nd ed, Mack Aron&Larson, [place] 2003. Copyright © 2003 Mack Aron&Larson.  Graphic 00732 Version 2.0    Consumer Information Use and Disclaimer   This information is not specific medical advice and does not replace information you receive from your health care provider. This is only a brief summary of general information. It does NOT include all information about conditions, illnesses, injuries, tests, procedures, treatments, therapies, discharge instructions or life-style choices that may apply to you. You must talk with your health care provider for complete information about your health and treatment options. This information should not be used to decide whether or not to accept your health care provider's advice, instructions or recommendations. Only your health care provider has the knowledge and training to provide advice that is right for you. The use of this information is governed by the [employer] End User License Agreement, available at " https://www.Pronutriaer.com/en/solutions/lexicomp/about/laureen.The use of [employer] content is governed by the [employer] Terms of Use. ©2021 UpToDate, Inc. All rights reserved.  Copyright   © 2021 UpToDate, Inc. and/or its affiliates. All rights reserved.      Patients with FL and parents of children with FL typically want information about clinical course, infectivity, and relapse. We reassure patients and parents that FL typically spontaneously resolves within two to three months, is thought to have a low likelihood for transmission, and does not recur in most patients

## 2022-01-11 NOTE — PROGRESS NOTES
"Subjective:       Patient ID: Lina Farmer is a 61 y.o. female.    Vitals:  height is 5' 3" (1.6 m) and weight is 72.6 kg (160 lb). Her temperature is 98.3 °F (36.8 °C). Her blood pressure is 150/60 (abnormal) and her pulse is 93. Her respiration is 18 and oxygen saturation is 98%.     Chief Complaint: Rash    Patient has had a rash on stomach and back since Sunday. She also has a cough that started around kevin. The rash started on her stomach with one large patch and has spread since. The rash mildly itches. She has not changed any lotions, soaps, or laundry products. She did eat a new dip 4 days prior to the rash appear and started taking a new otc cough medicine 3 days prior x 2 doses. She has used oatmeal baths and calamine lotion for the rash with some relief.    Rash  This is a new problem. The current episode started in the past 7 days. The problem has been gradually worsening since onset. The affected locations include the abdomen and back. The rash is characterized by redness and itchiness. She was exposed to nothing. Pertinent negatives include no anorexia, congestion, cough, facial edema, fatigue, fever, joint pain, rhinorrhea, shortness of breath or sore throat. Past treatments include anti-itch cream.       Constitution: Negative for fatigue and fever.   HENT: Negative for congestion and sore throat.    Respiratory: Negative for cough and shortness of breath.    Skin: Positive for rash. Negative for erythema.       Objective:      Physical Exam   Constitutional: She is oriented to person, place, and time. She appears well-developed and well-nourished. She is cooperative.  Non-toxic appearance. She does not have a sickly appearance. She does not appear ill. No distress.   HENT:   Head: Normocephalic and atraumatic. Head is without abrasion, without contusion and without laceration.   Ears:   Right Ear: Hearing, tympanic membrane, external ear and ear canal normal.   Left Ear: Hearing, tympanic " membrane, external ear and ear canal normal.   Nose: Nose normal. No mucosal edema, rhinorrhea or nasal deformity. No epistaxis. Right sinus exhibits no maxillary sinus tenderness and no frontal sinus tenderness. Left sinus exhibits no maxillary sinus tenderness and no frontal sinus tenderness.   Mouth/Throat: Uvula is midline, oropharynx is clear and moist and mucous membranes are normal. No trismus in the jaw. Normal dentition. No uvula swelling. No oropharyngeal exudate, posterior oropharyngeal edema or posterior oropharyngeal erythema.   Eyes: Conjunctivae, EOM and lids are normal. Pupils are equal, round, and reactive to light. No scleral icterus.   Neck: Trachea normal and phonation normal. Neck supple. No edema present. No erythema present. No neck rigidity present.   Cardiovascular: Normal rate, regular rhythm, normal heart sounds, intact distal pulses and normal pulses.   Pulmonary/Chest: Effort normal and breath sounds normal. No stridor. No respiratory distress. She has no decreased breath sounds. She has no rhonchi.   Abdominal: Normal appearance.   Musculoskeletal: Normal range of motion.         General: No deformity or edema. Normal range of motion.   Neurological: She is alert and oriented to person, place, and time. She exhibits normal muscle tone. Coordination normal.   Skin: Skin is warm, dry, intact, not diaphoretic, not pale and rash (erythematous,  raised, mildly pruritic rash diffuse to anterior and posterior trunk, spreading to B upper arms and neck). Capillary refill takes less than 2 seconds. No abrasion, No burn, No bruising, No erythema and No ecchymosis   Psychiatric: She has a normal mood and affect. Her speech is normal and behavior is normal. Judgment and thought content normal. Cognition and memory  Nursing note and vitals reviewed.                       Assessment:       1. Pityriasis rosea    2. Cough    3. Rash          Plan:         Pityriasis rosea  -     hydrOXYzine pamoate  "(VISTARIL) 25 MG Cap; Take 1 capsule (25 mg total) by mouth 4 (four) times daily. for 7 days  Dispense: 28 capsule; Refill: 0    Cough  -     Cancel: POCT COVID-19 Rapid Screening    Rash      Patient Instructions   Patient Education       Pityriasis Rosea   The Basics   Written by the doctors and editors at [facility]   What is pityriasis rosea? -- Pityriasis rosea is a harmless skin rash that causes small, itchy spots on the belly, back, chest, arms, and legs. The rash usually lasts about 4 to 6 weeks, but in some people, it can last for months.  Pityriasis rosea is most common in older children and young adults.  What causes pityriasis rosea? -- The cause is not known. But it does not seem to be easily spread from person to person.  What are the symptoms of pityriasis rosea? -- In many people, the rash starts with one round or oval patch. This spot is about the size of a half dollar but might be larger. A day or two later, many smaller spots about the size of a dime appear. But not everyone gets the large spot before the rest of the rash.  If you have light skin, the spots are usually pink or salmon-colored. If you have dark skin, the spots can be a red-brown color or darker than your skin and .  The spots might be:  · On the belly, back, chest, arms, and legs  · Spread out in a "fir tree" or "Fletcher tree" pattern on the back  · Itchy  · A little scaly  In children, the spots sometimes happen on the face and scalp.  Is there a test for pityriasis rosea? -- Maybe. Your doctor or nurse will often be able to tell if you have it by learning about your symptoms and doing an exam. But they might gently scrape the rash or do a different test to get a sample of your skin. Tests on the skin sample can help the doctor tell if you have pityriasis rosea or a different disease.  Is there anything I can do on my own to feel better? -- Yes. You can:  · Take a special kind of bath called an oatmeal bath. Use lukewarm, not hot " "water.  · Use unscented moisturizing lotion or cream on your skin.  · Try to keep your body cool.  How is pityriasis rosea treated? -- Most people do not need any treatment. If the symptoms bother you, your doctor might prescribe creams or ointments to help with itching. In rare cases, doctors prescribe other medicines or a special type of treatment that uses lights, called "phototherapy."  All topics are updated as new evidence becomes available and our peer review process is complete.  This topic retrieved from [employer] on: Sep 21, 2021.  Topic 06628 Version 8.0  Release: 29.4.2 - C29.263  © 2021 UpToDate, Inc. and/or its affiliates. All rights reserved.  picture 1: Pityriasis rosea     Pityriasis rosea is a skin rash that sometimes starts with a single large patch (see arrow). Many smaller spots appear within a few days. In people with light skin, the spots are pink or salmon-colored. In people with dark skin, the spots are a red-brown color or darker than their skin.  (A) Courtesy of Vazquez Springer MD. (B) Reproduced with permission from: Mejia BETTS. Mejia's Photoguide of Common Skin Disorders, 2nd ed, Mack Aron&Larson, [place] 2003. Copyright © 2003 Mack Aron&Larson.  Graphic 97903 Version 2.0    Consumer Information Use and Disclaimer   This information is not specific medical advice and does not replace information you receive from your health care provider. This is only a brief summary of general information. It does NOT include all information about conditions, illnesses, injuries, tests, procedures, treatments, therapies, discharge instructions or life-style choices that may apply to you. You must talk with your health care provider for complete information about your health and treatment options. This information should not be used to decide whether or not to accept your health care provider's advice, instructions or recommendations. Only your health care provider has the " knowledge and training to provide advice that is right for you. The use of this information is governed by the [employer] End User License Agreement, available at https://www.[employer]rum [employer]ik/en/solutions/[employer]/about/laureen.The use of [employer] content is governed by the [employer] Terms of Use. ©2021 UpToDate, Inc. All rights reserved.  Copyright   © 2021 UpToDate, Inc. and/or its affiliates. All rights reserved.      Patients with SC and parents of children with SC typically want information about clinical course, infectivity, and relapse. We reassure patients and parents that SC typically spontaneously resolves within two to three months, is thought to have a low likelihood for transmission, and does not recur in most patients

## 2022-01-20 ENCOUNTER — SPECIALTY PHARMACY (OUTPATIENT)
Dept: PHARMACY | Facility: CLINIC | Age: 62
End: 2022-01-20
Payer: MEDICARE

## 2022-01-20 NOTE — TELEPHONE ENCOUNTER
Specialty Pharmacy - Refill Coordination    Specialty Medication Orders Linked to Encounter    Flowsheet Row Most Recent Value   Medication #1 evolocumab (REPATHA SURECLICK) 140 mg/mL PnIj (Order#[id number], Rx#[id number])          Refill Questions - Documented Responses    Flowsheet Row Most Recent Value   Patient Availability and HIPAA Verification    Does patient want to proceed with activity? Yes   HIPAA/medical authority confirmed? Yes   Relationship to patient of person spoken to? Self   Refill Screening Questions    Would patient like to speak to a pharmacist? No   When does the patient need to receive the medication? 01/26/22   Refill Delivery Questions    How will the patient receive the medication? Delivery Emily   When does the patient need to receive the medication? 01/26/22   Shipping Address Home   Address in [facility] confirmed and updated if neccessary? Yes   Expected Copay ($) 0   Is the patient able to afford the medication copay? Yes   Payment Method zero copay   Days supply of Refill 28   Supplies needed? No supplies needed   Refill activity completed? Yes   Refill activity plan Refill scheduled   Shipment/Pickup Date: 01/24/22          Current Outpatient Medications   Medication Sig    aspirin (ECOTRIN) 81 MG EC tablet Take 81 mg by mouth once daily.    atorvastatin (LIPITOR) 40 MG tablet Take 1 tablet (40 mg total) by mouth once daily. (Patient not taking: Reported on 4/13/2021)    cyclobenzaprine (FLEXERIL) 10 MG tablet Take 10 mg by mouth 3 (three) times daily as needed for Muscle spasms.    evolocumab (REPATHA SURECLICK) 140 mg/mL PnIj Inject 1 mL (140 mg total) into the skin every 14 (fourteen) days.    ezetimibe (ZETIA) 10 mg tablet Take 1 tablet (10 mg total) by mouth once daily.    losartan-hydrochlorothiazide 100-12.5 mg (HYZAAR) 100-12.5 mg Tab Take 1 tablet by mouth once daily.    nicotine (NICODERM CQ) 14 mg/24 hr Place 1 patch onto the skin once daily.    nicotine,  polacrilex, (NICORETTE) 2 mg Gum Take 1 each (2 mg total) by mouth as needed (use in place of cigarettes).    oxyCODONE-acetaminophen (PERCOCET)  mg per tablet daily as needed.    pulse oximeter (PULSE OXIMETER) device by Apply Externally route 2 (two) times a day. Use twice daily at 8 AM and 3 PM and record the value in MyChart as directed.    tiZANidine (ZANAFLEX) 4 MG tablet Take 4 mg by mouth daily as needed.    zolpidem (AMBIEN) 10 mg Tab Take 10 mg by mouth once daily.   Last reviewed on 1/11/2022  3:22 PM by Chelsea Asencio NP    Review of patient's allergies indicates:  No Known Allergies Last reviewed on  1/18/2022 4:43 PM by Ann Bejarano      Tasks added this encounter   2/16/2022 - Refill Call (Auto Added)   Tasks due within next 3 months   No tasks due.     [facility]  [address] [id number]  Phone: [phone]  Fax: [phone]

## 2022-01-25 ENCOUNTER — TELEPHONE (OUTPATIENT)
Dept: SMOKING CESSATION | Facility: CLINIC | Age: 62
End: 2022-01-25
Payer: MEDICARE

## 2022-02-09 ENCOUNTER — TELEPHONE (OUTPATIENT)
Dept: SMOKING CESSATION | Facility: CLINIC | Age: 62
End: 2022-02-09
Payer: MEDICARE

## 2022-02-16 ENCOUNTER — SPECIALTY PHARMACY (OUTPATIENT)
Dept: PHARMACY | Facility: CLINIC | Age: 62
End: 2022-02-16
Payer: MEDICARE

## 2022-02-22 ENCOUNTER — PATIENT MESSAGE (OUTPATIENT)
Dept: RESEARCH | Facility: HOSPITAL | Age: 62
End: 2022-02-22
Payer: MEDICARE

## 2022-03-10 ENCOUNTER — SPECIALTY PHARMACY (OUTPATIENT)
Dept: PHARMACY | Facility: CLINIC | Age: 62
End: 2022-03-10
Payer: MEDICARE

## 2022-03-10 NOTE — TELEPHONE ENCOUNTER
Specialty Pharmacy - Refill Coordination    Specialty Medication Orders Linked to Encounter    Flowsheet Row Most Recent Value   Medication #1 evolocumab (REPATHA SURECLICK) 140 mg/mL PnIj (Order#[id number], Rx#[id number])          Refill Questions - Documented Responses    Flowsheet Row Most Recent Value   Patient Availability and HIPAA Verification    Does patient want to proceed with activity? Yes   HIPAA/medical authority confirmed? Yes   Relationship to patient of person spoken to? Self   Refill Screening Questions    Would patient like to speak to a pharmacist? No   When does the patient need to receive the medication? 03/24/22   Refill Delivery Questions    How will the patient receive the medication? Delivery Emily   When does the patient need to receive the medication? 03/24/22   Shipping Address Home   Address in [facility] confirmed and updated if neccessary? Yes   Expected Copay ($) 0   Is the patient able to afford the medication copay? Yes   Payment Method zero copay   Days supply of Refill 28   Supplies needed? No supplies needed   Refill activity completed? Yes   Refill activity plan Refill scheduled   Shipment/Pickup Date: 03/16/22          Current Outpatient Medications   Medication Sig    aspirin (ECOTRIN) 81 MG EC tablet Take 81 mg by mouth once daily.    atorvastatin (LIPITOR) 40 MG tablet Take 1 tablet (40 mg total) by mouth once daily. (Patient not taking: Reported on 4/13/2021)    cyclobenzaprine (FLEXERIL) 10 MG tablet Take 10 mg by mouth 3 (three) times daily as needed for Muscle spasms.    evolocumab (REPATHA SURECLICK) 140 mg/mL PnIj Inject 1 mL (140 mg total) into the skin every 14 (fourteen) days.    ezetimibe (ZETIA) 10 mg tablet Take 1 tablet (10 mg total) by mouth once daily.    losartan-hydrochlorothiazide 100-12.5 mg (HYZAAR) 100-12.5 mg Tab Take 1 tablet by mouth once daily.    nicotine (NICODERM CQ) 14 mg/24 hr Place 1 patch onto the skin once daily.    nicotine,  polacrilex, (NICORETTE) 2 mg Gum Take 1 each (2 mg total) by mouth as needed (use in place of cigarettes).    oxyCODONE-acetaminophen (PERCOCET)  mg per tablet daily as needed.    pulse oximeter (PULSE OXIMETER) device by Apply Externally route 2 (two) times a day. Use twice daily at 8 AM and 3 PM and record the value in MyChart as directed.    tiZANidine (ZANAFLEX) 4 MG tablet Take 4 mg by mouth daily as needed.    zolpidem (AMBIEN) 10 mg Tab Take 10 mg by mouth once daily.   Last reviewed on 1/11/2022  3:22 PM by Chelsea Asencio NP    Review of patient's allergies indicates:  No Known Allergies Last reviewed on  1/18/2022 4:43 PM by Ann Bejarano      Tasks added this encounter   No tasks added.   Tasks due within next 3 months   3/17/2022 - Refill Call (Auto Added)     [facility] - Specialty Pharmacy  [address] [place], [place] [address]erson thai  [facility] [address]  Phone: [phone]  Fax: [phone]

## 2022-03-13 ENCOUNTER — HOSPITAL ENCOUNTER (EMERGENCY)
Facility: HOSPITAL | Age: 62
Discharge: HOME | End: 2022-03-13
Attending: EMERGENCY MEDICINE
Payer: MEDICARE

## 2022-03-13 VITALS
SYSTOLIC BLOOD PRESSURE: 159 MMHG | DIASTOLIC BLOOD PRESSURE: 63 MMHG | TEMPERATURE: 98 F | WEIGHT: 160 LBS | HEART RATE: 63 BPM | OXYGEN SATURATION: 99 % | BODY MASS INDEX: 28.34 KG/M2 | RESPIRATION RATE: 16 BRPM

## 2022-03-13 DIAGNOSIS — S60.222A CONTUSION OF LEFT HAND, INITIAL ENCOUNTER: Primary | ICD-10-CM

## 2022-03-13 DIAGNOSIS — M25.532 LEFT WRIST PAIN: ICD-10-CM

## 2022-03-13 DIAGNOSIS — R07.89 CHEST WALL PAIN: ICD-10-CM

## 2022-03-13 DIAGNOSIS — M79.632 LEFT FOREARM PAIN: ICD-10-CM
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
## 2022-03-13 PROCEDURE — 25000003 PHARM REV CODE 250: Performed by: EMERGENCY MEDICINE

## 2022-03-13 PROCEDURE — 99284 PR EMERGENCY DEPT VISIT,LEVEL IV: ICD-10-PCS | Mod: ,,, | Performed by: EMERGENCY MEDICINE

## 2022-03-13 PROCEDURE — 99284 EMERGENCY DEPT VISIT MOD MDM: CPT | Mod: 25

## 2022-03-13 PROCEDURE — 99284 EMERGENCY DEPT VISIT MOD MDM: CPT | Mod: ,,, | Performed by: EMERGENCY MEDICINE

## 2022-03-13 RX ORDER — NAPROXEN 500 MG/1
500 TABLET ORAL 2 TIMES DAILY WITH MEALS
Qty: 30 TABLET | Refills: 0 | Status: SHIPPED | OUTPATIENT
Start: 2022-03-13 | End: 2023-04-21

## 2022-03-13 RX ORDER — ACETAMINOPHEN 500 MG
1000 TABLET ORAL
Status: COMPLETED | OUTPATIENT
Start: 2022-03-13 | End: 2022-03-13

## 2022-03-13 RX ORDER — HYDROCODONE BITARTRATE AND ACETAMINOPHEN 5; 325 MG/1; MG/1
1 TABLET ORAL EVERY 8 HOURS PRN
Qty: 9 TABLET | Refills: 0 | Status: SHIPPED | OUTPATIENT
Start: 2022-03-13 | End: 2022-03-16

## 2022-03-13 RX ORDER — CYCLOBENZAPRINE HCL 10 MG
10 TABLET ORAL 2 TIMES DAILY PRN
Qty: 15 TABLET | Refills: 0 | Status: SHIPPED | OUTPATIENT
Start: 2022-03-13 | End: 2022-03-18

## 2022-03-13 RX ADMIN — ACETAMINOPHEN 1000 MG: 500 TABLET ORAL at 09:03

## 2022-03-14 NOTE — DISCHARGE INSTRUCTIONS
I recommend ice and elevation to your left hand. Your X-rays are negative but you have a contusion there.    I recommend Naproxen twice a day for your pain. You can take Tylenol in addition to this. I recommend ice to your sore back and then heat after 24 hours. You can also try Salon Pas 4% lidocaine patches available over the counter.  I have prescribed a muscle relaxer, Flexeril, and an opiate pain medication called Norco for more severe pain. Do not drive or drink alcohol while taking these medications. They can also make you unsteady on your feet and cause constipation    Follow up with your primary doctor for re-evaluation.  Seek immediate medical attention if you have new or worsening symptoms, or for any other concern.
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
## 2022-03-16 ENCOUNTER — TELEPHONE (OUTPATIENT)
Dept: CARDIOLOGY | Facility: CLINIC | Age: 62
End: 2022-03-16
Payer: MEDICARE

## 2022-03-16 NOTE — TELEPHONE ENCOUNTER
Returned call to patient. Told her that no labs were ordered.    ----- Message from Sadaf Harrison sent at 3/16/2022 11:41 AM CDT -----  Contact: [facility] [phone] ext [phone] ( Lory)  Ins would like a f/u call regarding pt upcoming appt

## 2022-04-18 ENCOUNTER — PATIENT MESSAGE (OUTPATIENT)
Dept: ADMINISTRATIVE | Facility: OTHER | Age: 62
End: 2022-04-18
Payer: MEDICARE

## 2022-04-22 ENCOUNTER — OFFICE VISIT (OUTPATIENT)
Dept: CARDIOLOGY | Facility: CLINIC | Age: 62
End: 2022-04-22
Payer: MEDICARE

## 2022-04-22 VITALS — HEART RATE: 67 BPM | DIASTOLIC BLOOD PRESSURE: 70 MMHG | OXYGEN SATURATION: 98 % | SYSTOLIC BLOOD PRESSURE: 154 MMHG

## 2022-04-22 DIAGNOSIS — I25.118 ATHEROSCLEROSIS OF NATIVE CORONARY ARTERY OF NATIVE HEART WITH STABLE ANGINA PECTORIS: Primary | ICD-10-CM

## 2022-04-22 DIAGNOSIS — E11.59 TYPE 2 DIABETES MELLITUS WITH OTHER CIRCULATORY COMPLICATION, WITHOUT LONG-TERM CURRENT USE OF INSULIN: ICD-10-CM

## 2022-04-22 DIAGNOSIS — I65.22 STENOSIS OF LEFT CAROTID ARTERY: ICD-10-CM

## 2022-04-22 DIAGNOSIS — I70.209 ATHEROSCLEROTIC PERIPHERAL VASCULAR DISEASE: ICD-10-CM

## 2022-04-22 DIAGNOSIS — I10 ESSENTIAL HYPERTENSION: ICD-10-CM

## 2022-04-22 DIAGNOSIS — E78.00 HYPERCHOLESTEROLEMIA: ICD-10-CM

## 2022-04-22 PROCEDURE — 3077F PR MOST RECENT SYSTOLIC BLOOD PRESSURE >= 140 MM HG: ICD-10-PCS | Mod: CPTII,S$GLB,, | Performed by: INTERNAL MEDICINE

## 2022-04-22 PROCEDURE — 1160F RVW MEDS BY RX/DR IN RCRD: CPT | Mod: CPTII,S$GLB,, | Performed by: INTERNAL MEDICINE

## 2022-04-22 PROCEDURE — 99999 PR PBB SHADOW E&M-EST. PATIENT-LVL IV: ICD-10-PCS | Mod: PBBFAC,,, | Performed by: INTERNAL MEDICINE

## 2022-04-22 PROCEDURE — 1160F PR REVIEW ALL MEDS BY PRESCRIBER/CLIN PHARMACIST DOCUMENTED: ICD-10-PCS | Mod: CPTII,S$GLB,, | Performed by: INTERNAL MEDICINE

## 2022-04-22 PROCEDURE — 3078F PR MOST RECENT DIASTOLIC BLOOD PRESSURE < 80 MM HG: ICD-10-PCS | Mod: CPTII,S$GLB,, | Performed by: INTERNAL MEDICINE

## 2022-04-22 PROCEDURE — 99214 OFFICE O/P EST MOD 30 MIN: CPT | Mod: S$GLB,,, | Performed by: INTERNAL MEDICINE

## 2022-04-22 PROCEDURE — 1159F MED LIST DOCD IN RCRD: CPT | Mod: CPTII,S$GLB,, | Performed by: INTERNAL MEDICINE

## 2022-04-22 PROCEDURE — 99214 PR OFFICE/OUTPT VISIT, EST, LEVL IV, 30-39 MIN: ICD-10-PCS | Mod: S$GLB,,, | Performed by: INTERNAL MEDICINE

## 2022-04-22 PROCEDURE — 99999 PR PBB SHADOW E&M-EST. PATIENT-LVL IV: CPT | Mod: PBBFAC,,, | Performed by: INTERNAL MEDICINE

## 2022-04-22 PROCEDURE — 1159F PR MEDICATION LIST DOCUMENTED IN MEDICAL RECORD: ICD-10-PCS | Mod: CPTII,S$GLB,, | Performed by: INTERNAL MEDICINE

## 2022-04-22 PROCEDURE — 3077F SYST BP >= 140 MM HG: CPT | Mod: CPTII,S$GLB,, | Performed by: INTERNAL MEDICINE

## 2022-04-22 PROCEDURE — 3078F DIAST BP <80 MM HG: CPT | Mod: CPTII,S$GLB,, | Performed by: INTERNAL MEDICINE

## 2022-04-22 NOTE — PROGRESS NOTES
[facility]    Chief Complaint  Chief Complaint   Patient presents with    Coronary Artery Disease       HPI:    Still working with smoking cessation but unable to quit.  Has now been diagnosed with back problems.  Undergoing treatment.  Walking for exercise.  No problems with exertional dyspnea or chest discomfort.    Medications  Current Outpatient Medications   Medication Sig Dispense Refill    aspirin (ECOTRIN) 81 MG EC tablet Take 81 mg by mouth once daily.      losartan-hydrochlorothiazide 100-12.5 mg (HYZAAR) 100-12.5 mg Tab Take 1 tablet by mouth once daily.      nicotine (NICODERM CQ) 14 mg/24 hr Place 1 patch onto the skin once daily. 28 patch 0    nicotine, polacrilex, (NICORETTE) 2 mg Gum Take 1 each (2 mg total) by mouth as needed (use in place of cigarettes). 170 each 0    oxyCODONE-acetaminophen (PERCOCET)  mg per tablet daily as needed.      tiZANidine (ZANAFLEX) 4 MG tablet Take 4 mg by mouth daily as needed.      zolpidem (AMBIEN) 10 mg Tab Take 10 mg by mouth once daily.      atorvastatin (LIPITOR) 40 MG tablet Take 1 tablet (40 mg total) by mouth once daily. (Patient not taking: Reported on 4/13/2021) 90 tablet 3    ezetimibe (ZETIA) 10 mg tablet Take 1 tablet (10 mg total) by mouth once daily. 90 tablet 3    naproxen (NAPROSYN) 500 MG tablet Take 1 tablet (500 mg total) by mouth 2 (two) times daily with meals. (Patient not taking: Reported on 4/22/2022) 30 tablet 0    pulse oximeter (PULSE OXIMETER) device by Apply Externally route 2 (two) times a day. Use twice daily at 8 AM and 3 PM and record the value in MyChart as directed. (Patient not taking: Reported on 4/22/2022) 1 each 0     No current facility-administered medications for this visit.        History  Past Medical History:   Diagnosis Date    Atherosclerotic peripheral vascular disease     Carotid stenosis     Coronary atherosclerosis     RCA-    Diabetes mellitus, type 2     Diverticulitis large  intestine 2014    Hypercholesterolemia     Hypertension      Past Surgical History:   Procedure Laterality Date    ILIAC ARTERY STENT Left 2010    Maria T 8 x 29 mm    ILIAC ARTERY STENT Right 2010    Maria T 8 x 60 mm    TUBAL LIGATION      TYMPANOTOMY  2018     Social History     Socioeconomic History    Marital status:    Tobacco Use    Smoking status: Current Every Day Smoker     Last attempt to quit: 2021     Years since quittin.4    Smokeless tobacco: Never Used   Substance and Sexual Activity    Alcohol use: Yes     Family History   Problem Relation Age of Onset    Coronary artery disease Mother     Coronary artery disease Father     Diabetes Father     Hypertension Sister     Hypertension Brother         Allergies  Review of patient's allergies indicates:  No Known Allergies    Review of Systems   Review of Systems   Constitutional: Negative for malaise/fatigue, weight gain and weight loss.   Eyes: Negative for visual disturbance.   Cardiovascular: Negative for chest pain, claudication, cyanosis, dyspnea on exertion, irregular heartbeat, leg swelling, near-syncope, orthopnea, palpitations, paroxysmal nocturnal dyspnea and syncope.   Respiratory: Negative for cough, hemoptysis, shortness of breath, sleep disturbances due to breathing and wheezing.    Hematologic/Lymphatic: Negative for bleeding problem. Does not bruise/bleed easily.   Skin: Negative for poor wound healing.   Musculoskeletal: Negative for muscle cramps and myalgias.   Gastrointestinal: Negative for abdominal pain, anorexia, diarrhea, heartburn, hematemesis, hematochezia, melena, nausea and vomiting.   Genitourinary: Negative for hematuria and nocturia.   Neurological: Negative for excessive daytime sleepiness, dizziness, focal weakness, light-headedness and weakness.       Physical Exam  Vitals:    22 1323   BP: (!) 154/70   Pulse: 67     Wt Readings from Last 1 Encounters:   22 (P)  72.9 kg (160 lb 11.5 oz)     Physical Exam  Constitutional:       General: She is not in acute distress.     Appearance: She is not toxic-appearing or diaphoretic.   HENT:      Head: Normocephalic and atraumatic.   Eyes:      General: No scleral icterus.     Conjunctiva/sclera: Conjunctivae normal.   Neck:      Thyroid: No thyromegaly.      Vascular: No hepatojugular reflux or JVD.      Trachea: No tracheal deviation.   Cardiovascular:      Rate and Rhythm: Normal rate and regular rhythm.  No extrasystoles are present.     Chest Wall: PMI is not displaced.      Pulses:           Carotid pulses are 2+ on the right side and 2+ on the left side with bruit.       Radial pulses are 2+ on the right side and 2+ on the left side.        Dorsalis pedis pulses are 1+ on the right side and 1+ on the left side.        Posterior tibial pulses are 1+ on the right side and 1+ on the left side.      Heart sounds: S1 normal and S2 normal. No murmur heard.    Gallop present. S4 sounds present. No S3 sounds.   Pulmonary:      Effort: No accessory muscle usage or respiratory distress.      Breath sounds: No decreased breath sounds, wheezing, rhonchi or rales.   Abdominal:      General: Bowel sounds are normal. There is abdominal bruit.      Palpations: Abdomen is soft. There is no hepatomegaly, splenomegaly or pulsatile mass.      Tenderness: There is no abdominal tenderness.   Musculoskeletal:         General: No tenderness or deformity.      Cervical back: Neck supple.   Skin:     General: Skin is warm and dry.      Coloration: Skin is not pale.      Nails: There is no clubbing.   Neurological:      Mental Status: She is alert and oriented to person, place, and time.      Cranial Nerves: No cranial nerve deficit.      Sensory: No sensory deficit.   Psychiatric:         Speech: Speech normal.         Behavior: Behavior normal. Behavior is cooperative.         Labs  Clinical Support on 10/27/2021   Component Date Value Ref Range Status     POC Blood, Urine 10/27/2021 Positive (A) Negative Final    50    POC Bilirubin, Urine 10/27/2021 Negative  Negative Final    POC Urobilinogen, Urine 10/27/2021 normal  0.1 - 1.1 Final    POC Ketones, Urine 10/27/2021 Negative  Negative Final    POC Protein, Urine 10/27/2021 Negative  Negative Final    POC Nitrates, Urine 10/27/2021 Negative  Negative Final    POC Glucose, Urine 10/27/2021 Negative  Negative Final    pH, UA 10/27/2021 5.5  5 - 8 Final    POC Specific Gravity, Urine 10/27/2021 1.015  1.003 - 1.029 Final    POC Leukocytes, Urine 10/27/2021 Positive (A) Negative Final    10    Urine Culture, Routine 10/27/2021  (A)  Final                    Value:ESCHERICHIA COLI  50,000 - 99,999 cfu/ml         Imaging  No results found.    Assessment  1. Atherosclerosis of native coronary artery of native heart with stable angina pectoris  Stable and free of angina    2. Atherosclerotic peripheral vascular disease  Non limiting.  Most of her leg pain seems to be related to her back.    3. Stenosis of left carotid artery  Time for reassessment  - CV Ultrasound Bilateral Doppler Carotid; Future at [facility] because of insurance    4. Essential hypertension  He did not take her medications today    5. Hypercholesterolemia  Tolerating Repatha    6. Type 2 diabetes mellitus with other circulatory complication, without long-term current use of insulin  Per primary      Plan and Discussion    Continue current guideline directed medical therapy.  Smoking cessation efforts encouraged.    The 10-year ASCVD risk score (Eaton SANCHEZ Jr., et al., 2013) is: 49.7%    Values used to calculate the score:      Age: 62 years      Sex: Female      Is Non- : Yes      Diabetic: Yes      Tobacco smoker: Yes      Systolic Blood Pressure: 154 mmHg      Is BP treated: Yes      HDL Cholesterol: 51 mg/dL      Total Cholesterol: 194 mg/dL     Follow Up  Follow up in about 6 months (around 10/22/2022).      Maurizio RIVERA  MD Efra

## 2022-04-25 ENCOUNTER — SPECIALTY PHARMACY (OUTPATIENT)
Dept: PHARMACY | Facility: CLINIC | Age: 62
End: 2022-04-25
Payer: MEDICARE

## 2022-04-25 NOTE — TELEPHONE ENCOUNTER
Specialty Pharmacy - Refill Coordination    Specialty Medication Orders Linked to Encounter    Flowsheet Row Most Recent Value   Medication #1 evolocumab (REPATHA SURECLICK) 140 mg/mL PnIj (Order#[id number], Rx#[id number])          Refill Questions - Documented Responses    Flowsheet Row Most Recent Value   Patient Availability and HIPAA Verification    Does patient want to proceed with activity? Yes   HIPAA/medical authority confirmed? Yes   Relationship to patient of person spoken to? Self   Refill Screening Questions    Would patient like to speak to a pharmacist? No   When does the patient need to receive the medication? 04/28/22   Refill Delivery Questions    How will the patient receive the medication? Delivery Emily   When does the patient need to receive the medication? 04/28/22   Shipping Address Home   Address in [facility] confirmed and updated if neccessary? Yes   Expected Copay ($) 0   Is the patient able to afford the medication copay? Yes   Payment Method zero copay   Days supply of Refill 28   Supplies needed? No supplies needed   Refill activity completed? Yes   Refill activity plan Refill scheduled   Shipment/Pickup Date: 04/27/22          Current Outpatient Medications   Medication Sig    aspirin (ECOTRIN) 81 MG EC tablet Take 81 mg by mouth once daily.    atorvastatin (LIPITOR) 40 MG tablet Take 1 tablet (40 mg total) by mouth once daily. (Patient not taking: Reported on 4/13/2021)    evolocumab (REPATHA SURECLICK) 140 mg/mL PnIj Inject 1 mL (140 mg total) into the skin every 14 (fourteen) days.    ezetimibe (ZETIA) 10 mg tablet Take 1 tablet (10 mg total) by mouth once daily.    losartan-hydrochlorothiazide 100-12.5 mg (HYZAAR) 100-12.5 mg Tab Take 1 tablet by mouth once daily.    naproxen (NAPROSYN) 500 MG tablet Take 1 tablet (500 mg total) by mouth 2 (two) times daily with meals. (Patient not taking: Reported on 4/22/2022)    nicotine (NICODERM CQ) 14 mg/24 hr Place 1 patch onto  the skin once daily.    nicotine, polacrilex, (NICORETTE) 2 mg Gum Take 1 each (2 mg total) by mouth as needed (use in place of cigarettes).    oxyCODONE-acetaminophen (PERCOCET)  mg per tablet daily as needed.    pulse oximeter (PULSE OXIMETER) device by Apply Externally route 2 (two) times a day. Use twice daily at 8 AM and 3 PM and record the value in MyChart as directed. (Patient not taking: Reported on 4/22/2022)    tiZANidine (ZANAFLEX) 4 MG tablet Take 4 mg by mouth daily as needed.    zolpidem (AMBIEN) 10 mg Tab Take 10 mg by mouth once daily.   Last reviewed on 4/22/2022  1:28 PM by Maurizio Kraus MD    Review of patient's allergies indicates:  No Known Allergies Last reviewed on  4/22/2022 1:28 PM by Maurizio Kraus      Tasks added this encounter   No tasks added.   Tasks due within next 3 months   4/14/2022 - Refill Call (Auto Added)     [facility]  [address]  [facility] [address]  Phone: [phone]  Fax: [phone]

## 2022-05-15 ENCOUNTER — HOSPITAL ENCOUNTER (EMERGENCY)
Facility: HOSPITAL | Age: 62
Discharge: HOME | End: 2022-05-15
Attending: EMERGENCY MEDICINE
Payer: MEDICARE

## 2022-05-15 ENCOUNTER — NURSE TRIAGE (OUTPATIENT)
Dept: ADMINISTRATIVE | Facility: CLINIC | Age: 62
End: 2022-05-15
Payer: MEDICARE

## 2022-05-15 VITALS
RESPIRATION RATE: 18 BRPM | OXYGEN SATURATION: 97 % | BODY MASS INDEX: 27.99 KG/M2 | TEMPERATURE: 98 F | WEIGHT: 158 LBS | SYSTOLIC BLOOD PRESSURE: 193 MMHG | HEART RATE: 94 BPM | DIASTOLIC BLOOD PRESSURE: 78 MMHG

## 2022-05-15 DIAGNOSIS — R05.9 COUGH: ICD-10-CM

## 2022-05-15 PROCEDURE — 99284 PR EMERGENCY DEPT VISIT,LEVEL IV: ICD-10-PCS | Mod: ,,, | Performed by: EMERGENCY MEDICINE

## 2022-05-15 PROCEDURE — 99283 EMERGENCY DEPT VISIT LOW MDM: CPT | Mod: 25

## 2022-05-15 PROCEDURE — 99284 EMERGENCY DEPT VISIT MOD MDM: CPT | Mod: ,,, | Performed by: EMERGENCY MEDICINE

## 2022-05-15 PROCEDURE — 25000003 PHARM REV CODE 250: Performed by: EMERGENCY MEDICINE

## 2022-05-15 RX ORDER — BENZONATATE 100 MG/1
100 CAPSULE ORAL 3 TIMES DAILY PRN
Qty: 20 CAPSULE | Refills: 0 | Status: SHIPPED | OUTPATIENT
Start: 2022-05-15 | End: 2022-05-25

## 2022-05-15 RX ORDER — BENZONATATE 100 MG/1
100 CAPSULE ORAL
Status: COMPLETED | OUTPATIENT
Start: 2022-05-15 | End: 2022-05-15

## 2022-05-15 RX ADMIN — BENZONATATE 100 MG: 100 CAPSULE ORAL at 09:05

## 2022-05-15 NOTE — TELEPHONE ENCOUNTER
Reason for Disposition   Patient sounds very sick or weak to the triager    Additional Information   Negative: Bluish (or gray) lips or face now   Negative: SEVERE difficulty breathing (e.g., struggling for each breath, speaks in single words)   Negative: Difficult to awaken or acting confused (e.g., disoriented, slurred speech)   Negative: Shock suspected (e.g., cold/pale/clammy skin, too weak to stand, low BP, rapid pulse)   Negative: Sounds like a life-threatening emergency to the triager   Negative: [1] Symptoms of COVID-19 (e.g., cough, fever, SOB, or others) AND [2] lives in an area with community spread   Negative: [1] Sounds like a cold AND [2] no fever   Negative: [1] Cough with sputum AND [2] no fever   Negative: [1] Dry cough (no sputum) sputum AND [2] no fever   Negative: [1] Throat pain AND [2] no fever   Negative: Influenza vaccine reaction is suspected   Negative: Chest pain  (Exception: MILD central chest pain, present only when coughing)   Negative: [1] Headache AND [2] stiff neck (can't touch chin to chest)   Negative: Fever > 104 F (40 C)   Negative: [1] Difficulty breathing AND [2] not severe AND [3] not from stuffy nose (e.g., not relieved by cleaning out the nose)    Protocols used: INFLUENZA - SEASONAL-A-  pt called re x 2 days coughing and throat hurting , afeb. around someone with the flu. hx tob. cant stop coughing. denies asthma. Pt admits to feeling very sick. rec ED now.pt agrees. Call back with questions.

## 2022-05-16 NOTE — ED TRIAGE NOTES
Lina Farmer, a 62 y.o. female presents to the ED w/ complaint of cough, exposure to unknown virus (not covid or flu) from grandson. Pt stated elevated B/P r/t cough. Also c/o fatigue. Denies fever, chills, sweats.     Triage note:  Chief Complaint   Patient presents with    Cough     Pt c/o of persistent dry cough x 2 days. Denies fever, chills, or sore throat     Review of patient's allergies indicates:  No Known Allergies  Past Medical History:   Diagnosis Date    Atherosclerotic peripheral vascular disease     Carotid stenosis     Coronary atherosclerosis     RCA-    Diabetes mellitus, type 2     Diverticulitis large intestine 05/2014    Hypercholesterolemia     Hypertension      Patient identifiers verified verbally with patient and correct for Lina Farmer.    LOC/ APPEARANCE: The patient is AAOx4. Pt is speaking appropriately, no slurred speech. Pt reports pain level of 0.  SKIN: Skin is warm dry and intact, and color is consistent with ethnicity.   RESPIRATORY: Airway is open and patent. Respirations-spontaneous, unlabored, regular rate, equal bilaterally on inspiration and expiration. No accessory muscle use noted. Positive for non-productive cough.   CARDIAC: Patient has regular heart rate.  No peripheral edema noted. Positive for chest pain with coughing.    ABDOMEN: Soft and non-tender to palpation with no distention noted. Pt has no complaints of abnormal bowel movements, denies blood in stool. Pt reports normal appetite.   NEUROLOGIC: Eyes open spontaneously and facial expression symmetrical. Pt behavior appropriate to situation, and pt follows commands. Pt reports sensation present in all extremities when touched with a finger, denies any numbness or tingling.   MUSCULOSKELETAL: Spontaneous movement noted to all extremities. Hand  equal and leg strength strong +5 bilaterally.   : No complaints of frequency, burning, urgency or blood in the urine. No complaints of  incontinence.

## 2022-05-16 NOTE — ED PROVIDER NOTES
SCRIBE #1 NOTE: I, Giovanna Anna, am scribing for, and in the presence of,  Scar York MD. I have scribed the following portions of the note: HPI, ROS, PE.     Source of History:  pt    Chief complaint:  Cough (Pt c/o of persistent dry cough x 2 days. Denies fever, chills, or sore throat)      HPI:  Lina Farmer is a 62 y.o. female presenting with a dry cough with onset 2 days ago. She reports the coughing hurts her chest and she was unable to sleep last night because of it. Her niece recently had the flu and her grandson recently had a cough and she was taking care of him. She took him to the hospital and his COVID-19 and flu tests were negative. A few years ago she took some OTC cough syrup which increased her blood pressure and was told to start using cough syrup specifically for people with HTN which she has been using with no improvement to her symptoms. She has also been taking Halls cough drops and thinks it is elevating her blood pressure. Her pressure is normally 120/60 but she forgot to take her medicine this morning. She took it about an hour ago. She reports she had bronchitis years ago but denies any other lung history. She has been smoking 0.5 packs of cigarettes/day for years and quit cold turkey today.    ROS: As per HPI and below:    General: No fever.  No chills.  Eyes: No visual changes.  Head: No headache.    Integument: No rashes or lesions.  Chest: No shortness of breath. + Cough  Cardiovascular: + Chest pain.  Abdomen: No abdominal pain.  No nausea or vomiting.  Urinary: No abnormal urination.  Neurologic: No focal weakness.  No numbness.  Hematologic: No easy bruising.  Endocrine: No excessive thirst or urination.      Review of patient's allergies indicates:  No Known Allergies    No current facility-administered medications on file prior to encounter.     Current Outpatient Medications on File Prior to Encounter   Medication Sig Dispense Refill    aspirin (ECOTRIN) 81 MG EC  tablet Take 81 mg by mouth once daily.      atorvastatin (LIPITOR) 40 MG tablet Take 1 tablet (40 mg total) by mouth once daily. (Patient not taking: Reported on 4/13/2021) 90 tablet 3    evolocumab (REPATHA SURECLICK) 140 mg/mL PnIj Inject 1 mL (140 mg total) into the skin every 14 (fourteen) days. 2 mL 11    ezetimibe (ZETIA) 10 mg tablet Take 1 tablet (10 mg total) by mouth once daily. 90 tablet 3    losartan-hydrochlorothiazide 100-12.5 mg (HYZAAR) 100-12.5 mg Tab Take 1 tablet by mouth once daily.      naproxen (NAPROSYN) 500 MG tablet Take 1 tablet (500 mg total) by mouth 2 (two) times daily with meals. (Patient not taking: Reported on 4/22/2022) 30 tablet 0    nicotine (NICODERM CQ) 14 mg/24 hr Place 1 patch onto the skin once daily. 28 patch 0    nicotine, polacrilex, (NICORETTE) 2 mg Gum Take 1 each (2 mg total) by mouth as needed (use in place of cigarettes). 170 each 0    oxyCODONE-acetaminophen (PERCOCET)  mg per tablet daily as needed.      pulse oximeter (PULSE OXIMETER) device by Apply Externally route 2 (two) times a day. Use twice daily at 8 AM and 3 PM and record the value in MyChart as directed. (Patient not taking: Reported on 4/22/2022) 1 each 0    tiZANidine (ZANAFLEX) 4 MG tablet Take 4 mg by mouth daily as needed.      zolpidem (AMBIEN) 10 mg Tab Take 10 mg by mouth once daily.         PMH:  As per HPI and below:  Past Medical History:   Diagnosis Date    Atherosclerotic peripheral vascular disease     Carotid stenosis     Coronary atherosclerosis     RCA-    Diabetes mellitus, type 2     Diverticulitis large intestine 05/2014    Hypercholesterolemia     Hypertension      Past Surgical History:   Procedure Laterality Date    ILIAC ARTERY STENT Left 02/24/2010    Maria T 8 x 29 mm    ILIAC ARTERY STENT Right 02/17/2010    Maria T 8 x 60 mm    TUBAL LIGATION      TYMPANOTOMY  11/2018       Social History     Socioeconomic History    Marital status:     Tobacco Use    Smoking status: Current Every Day Smoker     Last attempt to quit: 2021     Years since quittin.5    Smokeless tobacco: Never Used   Substance and Sexual Activity    Alcohol use: Yes       Family History   Problem Relation Age of Onset    Coronary artery disease Mother     Coronary artery disease Father     Diabetes Father     Hypertension Sister     Hypertension Brother        Physical Exam:    Vitals:    05/15/22 1859   BP: (!) 193/78   Pulse: 94   Resp: 18   Temp: 98.3 °F (36.8 °C)     Appearance: No acute distress.  Skin: No rashes seen.  Good turgor.  No abrasions.  No ecchymoses.  Eyes: No conjunctival injection. EOMI, PERRL.  ENT: Oropharynx clear.    Chest:  No increased work of breathing, bilateral chest rise.  Cardiovascular: Regular rate and rhythm.  Normal equal bilateral radial pulses.  Abdomen: Soft.  Not distended.  Nontender.  No guarding.  No rebound. No Masses  Musculoskeletal: Good range of motion all joints.  No deformities.  Neck supple, full range of motion, no obvious deformity.  Neurologic: Moves all extremities.  Normal sensation.  No facial droop.  Normal speech.    Mental Status:  Alert and oriented x 3.  Appropriate, conversant.          Laboratory Studies:  Labs Reviewed - No data to display      I independently interpreted the CXR:  No pneumonia or infiltrate    Imaging Results          X-Ray Chest 1 View (Final result)  Result time 05/15/22 22:08:32    Final result by Juanito Boggs MD (05/15/22 22:08:32)                 Impression:      Right perihilar airspace opacity.      Electronically signed by: Juanito Boggs MD  Date:    05/15/2022  Time:    22:08             Narrative:    EXAMINATION:  XR CHEST 1 VIEW    CLINICAL HISTORY:  Cough, unspecified    TECHNIQUE:  Single frontal view of the chest was performed.    COMPARISON:  2022.    FINDINGS:  There are postoperative changes in the cervical spine.  The trachea is unremarkable.  The  cardiomediastinal silhouette is within normal limits.  The hemidiaphragms are unremarkable.  There are no pleural effusions.  There is no evidence of a pneumothorax.  There is no evidence of pneumomediastinum.  There is a right perihilar airspace opacity.  There are degenerative changes in the osseous structures.                                Medications Given:  Medications   benzonatate capsule 100 mg (100 mg Oral Given 5/15/22 2139)       Discussed with: pt    MDM:    62 y.o. female with 2 days cough after exposure to her grandson who has a viral illness.  Her vitals are stable, she is afebrile.  I do not suspect pneumonia, pneumothorax, pulmonary effusion with sepsis.  Chest x-ray is reassuring, there is mention of airspace opacity, however it is inconsistent with pneumonia.  given Tessalon in the ED.  Will prescribe Tessalon for home as well.  Counseled on cessation of smoking.  Advised pt to follow up with PCP or return if concerning symptoms arise. Pt understands and agrees with plan. Will d/c home.          Diagnostic Impression:    1. Cough            Scribe Attestation:  Scribe #1: I performed the above scribed service and the documentation accurately describes the services I performed. I attest to the accuracy of the note.     Scar York MD  05/15/22 3653

## 2022-05-19 ENCOUNTER — SPECIALTY PHARMACY (OUTPATIENT)
Dept: PHARMACY | Facility: CLINIC | Age: 62
End: 2022-05-19
Payer: MEDICARE

## 2022-05-24 ENCOUNTER — TELEPHONE (OUTPATIENT)
Dept: SMOKING CESSATION | Facility: CLINIC | Age: 62
End: 2022-05-24
Payer: MEDICARE

## 2022-06-16 ENCOUNTER — SPECIALTY PHARMACY (OUTPATIENT)
Dept: PHARMACY | Facility: CLINIC | Age: 62
End: 2022-06-16
Payer: MEDICARE

## 2022-06-16 NOTE — TELEPHONE ENCOUNTER
Specialty Pharmacy - Refill Coordination    Specialty Medication Orders Linked to Encounter    Flowsheet Row Most Recent Value   Medication #1 evolocumab (REPATHA SURECLICK) 140 mg/mL PnIj (Order#[id number], Rx#[id number])          Refill Questions - Documented Responses    Flowsheet Row Most Recent Value   Patient Availability and HIPAA Verification    Does patient want to proceed with activity? Yes   HIPAA/medical authority confirmed? Yes   Relationship to patient of person spoken to? Self   Refill Screening Questions    Would patient like to speak to a pharmacist? No   When does the patient need to receive the medication? 06/20/22   Refill Delivery Questions    How will the patient receive the medication? Delivery Emily   When does the patient need to receive the medication? 06/20/22   Shipping Address Home   Address in [facility] confirmed and updated if neccessary? Yes   Expected Copay ($) 0   Is the patient able to afford the medication copay? Yes   Payment Method zero copay   Days supply of Refill 28   Supplies needed? No supplies needed   Refill activity completed? Yes   Refill activity plan Refill scheduled   Shipment/Pickup Date: 06/20/22          Current Outpatient Medications   Medication Sig    aspirin (ECOTRIN) 81 MG EC tablet Take 81 mg by mouth once daily.    atorvastatin (LIPITOR) 40 MG tablet Take 1 tablet (40 mg total) by mouth once daily. (Patient not taking: Reported on 4/13/2021)    evolocumab (REPATHA SURECLICK) 140 mg/mL PnIj Inject 1 mL (140 mg total) into the skin every 14 (fourteen) days.    ezetimibe (ZETIA) 10 mg tablet TAKE 1 TABLET(10 MG) BY MOUTH EVERY DAY    losartan-hydrochlorothiazide 100-12.5 mg (HYZAAR) 100-12.5 mg Tab Take 1 tablet by mouth once daily.    naproxen (NAPROSYN) 500 MG tablet Take 1 tablet (500 mg total) by mouth 2 (two) times daily with meals. (Patient not taking: Reported on 4/22/2022)    nicotine (NICODERM CQ) 14 mg/24 hr Place 1 patch onto the skin  once daily.    nicotine, polacrilex, (NICORETTE) 2 mg Gum Take 1 each (2 mg total) by mouth as needed (use in place of cigarettes).    oxyCODONE-acetaminophen (PERCOCET)  mg per tablet daily as needed.    pulse oximeter (PULSE OXIMETER) device by Apply Externally route 2 (two) times a day. Use twice daily at 8 AM and 3 PM and record the value in MyChart as directed. (Patient not taking: Reported on 4/22/2022)    tiZANidine (ZANAFLEX) 4 MG tablet Take 4 mg by mouth daily as needed.    zolpidem (AMBIEN) 10 mg Tab Take 10 mg by mouth once daily.   Last reviewed on 5/15/2022  9:00 PM by Perri Schneider RN    Review of patient's allergies indicates:  No Known Allergies Last reviewed on  5/15/2022 9:00 PM by Perri Schneider      Tasks added this encounter   7/11/2022 - Refill Call (Auto Added)   Tasks due within next 3 months   No tasks due.     Zandra Thomas, Patient Care Assistant  [facility]  [address]  [facility] [address]  Phone: [phone]  Fax: [phone]

## 2022-06-23 ENCOUNTER — TELEPHONE (OUTPATIENT)
Dept: CARDIOLOGY | Facility: CLINIC | Age: 62
End: 2022-06-23
Payer: MEDICARE

## 2022-06-23 DIAGNOSIS — I65.22 STENOSIS OF LEFT CAROTID ARTERY: Primary | ICD-10-CM

## 2022-06-23 NOTE — TELEPHONE ENCOUNTER
Returned call to patient. Rescheduled appointment for Carotid U/S.    ----- Message from Denice Magana sent at 6/23/2022 11:37 AM CDT -----  Regarding: Patient call back  Who called:ANNALISA MCCORMACK [[id number]]    What is the request in detail: Patient is requesting a call back. She states she would like to discuss scheduling some tests.   Please advise.    Can the clinic reply by [facility]? No    Best call back number: [phone]    Additional Information: N/A

## 2022-07-11 ENCOUNTER — SPECIALTY PHARMACY (OUTPATIENT)
Dept: PHARMACY | Facility: CLINIC | Age: 62
End: 2022-07-11
Payer: MEDICARE

## 2022-07-11 NOTE — TELEPHONE ENCOUNTER
Specialty Pharmacy - Refill Coordination    Specialty Medication Orders Linked to Encounter    Flowsheet Row Most Recent Value   Medication #1 evolocumab (REPATHA SURECLICK) 140 mg/mL PnIj (Order#[id number], Rx#[id number])          Refill Questions - Documented Responses    Flowsheet Row Most Recent Value   Patient Availability and HIPAA Verification    Does patient want to proceed with activity? Yes   HIPAA/medical authority confirmed? Yes   Relationship to patient of person spoken to? Self   Refill Screening Questions    Would patient like to speak to a pharmacist? No   When does the patient need to receive the medication? 07/18/22   Refill Delivery Questions    How will the patient receive the medication? Delivery Emily   When does the patient need to receive the medication? 07/18/22   Shipping Address Home   Expected Copay ($) 0   Is the patient able to afford the medication copay? Yes   Payment Method zero copay   Days supply of Refill 28   Supplies needed? No supplies needed   Refill activity completed? Yes   Refill activity plan Refill scheduled   Shipment/Pickup Date: 07/14/22          Current Outpatient Medications   Medication Sig    aspirin (ECOTRIN) 81 MG EC tablet Take 81 mg by mouth once daily.    atorvastatin (LIPITOR) 40 MG tablet Take 1 tablet (40 mg total) by mouth once daily. (Patient not taking: Reported on 4/13/2021)    evolocumab (REPATHA SURECLICK) 140 mg/mL PnIj Inject 1 mL (140 mg total) into the skin every 14 (fourteen) days.    ezetimibe (ZETIA) 10 mg tablet TAKE 1 TABLET(10 MG) BY MOUTH EVERY DAY    losartan-hydrochlorothiazide 100-12.5 mg (HYZAAR) 100-12.5 mg Tab Take 1 tablet by mouth once daily.    naproxen (NAPROSYN) 500 MG tablet Take 1 tablet (500 mg total) by mouth 2 (two) times daily with meals. (Patient not taking: Reported on 4/22/2022)    nicotine (NICODERM CQ) 14 mg/24 hr Place 1 patch onto the skin once daily.    nicotine, polacrilex, (NICORETTE) 2 mg Gum Take 1 each  (2 mg total) by mouth as needed (use in place of cigarettes).    oxyCODONE-acetaminophen (PERCOCET)  mg per tablet daily as needed.    pulse oximeter (PULSE OXIMETER) device by Apply Externally route 2 (two) times a day. Use twice daily at 8 AM and 3 PM and record the value in MyChart as directed. (Patient not taking: Reported on 4/22/2022)    tiZANidine (ZANAFLEX) 4 MG tablet Take 4 mg by mouth daily as needed.    zolpidem (AMBIEN) 10 mg Tab Take 10 mg by mouth once daily.   Last reviewed on 5/15/2022  9:00 PM by Perri Schneider RN    Review of patient's allergies indicates:  No Known Allergies Last reviewed on  5/15/2022 9:00 PM by Perri Schneider      Tasks added this encounter   8/8/2022 - Refill Call (Auto Added)   Tasks due within next 3 months   No tasks due.     Carina Dickerson, PharmD  [facility]  [address] [place], [place] [address]  [facility] [address]  Phone: [phone]  Fax: [phone]

## 2022-07-21 ENCOUNTER — OUTSIDE PLACE OF SERVICE (OUTPATIENT)
Dept: CARDIOLOGY | Facility: CLINIC | Age: 62
End: 2022-07-21
Payer: MEDICARE

## 2022-07-21 PROCEDURE — 93880 EXTRACRANIAL BILAT STUDY: CPT | Mod: 26,,, | Performed by: INTERNAL MEDICINE

## 2022-07-21 PROCEDURE — 93880 PR DUPLEX SCAN EXTRACRANIAL,BILAT: ICD-10-PCS | Mod: 26,,, | Performed by: INTERNAL MEDICINE

## 2022-08-08 ENCOUNTER — SPECIALTY PHARMACY (OUTPATIENT)
Dept: PHARMACY | Facility: CLINIC | Age: 62
End: 2022-08-08
Payer: MEDICARE

## 2022-08-08 NOTE — TELEPHONE ENCOUNTER
Specialty Pharmacy - Refill Coordination    Specialty Medication Orders Linked to Encounter    Flowsheet Row Most Recent Value   Medication #1 evolocumab (REPATHA SURECLICK) 140 mg/mL PnIj (Order#[id number], Rx#[id number])          Refill Questions - Documented Responses    Flowsheet Row Most Recent Value   Patient Availability and HIPAA Verification    Does patient want to proceed with activity? Yes   HIPAA/medical authority confirmed? Yes   Relationship to patient of person spoken to? Self   Refill Screening Questions    Would patient like to speak to a pharmacist? No   When does the patient need to receive the medication? 08/09/22   Refill Delivery Questions    How will the patient receive the medication? Delivery Emily   When does the patient need to receive the medication? 08/09/22   Shipping Address Home   Address in [facility] confirmed and updated if neccessary? Yes   Expected Copay ($) 0   Is the patient able to afford the medication copay? Yes   Payment Method zero copay   Days supply of Refill 28   Supplies needed? No supplies needed   Refill activity completed? Yes   Refill activity plan Refill scheduled   Shipment/Pickup Date: 08/08/22          Current Outpatient Medications   Medication Sig    aspirin (ECOTRIN) 81 MG EC tablet Take 81 mg by mouth once daily.    atorvastatin (LIPITOR) 40 MG tablet Take 1 tablet (40 mg total) by mouth once daily. (Patient not taking: Reported on 4/13/2021)    evolocumab (REPATHA SURECLICK) 140 mg/mL PnIj Inject 1 mL (140 mg total) into the skin every 14 (fourteen) days.    ezetimibe (ZETIA) 10 mg tablet TAKE 1 TABLET(10 MG) BY MOUTH EVERY DAY    losartan-hydrochlorothiazide 100-12.5 mg (HYZAAR) 100-12.5 mg Tab Take 1 tablet by mouth once daily.    naproxen (NAPROSYN) 500 MG tablet Take 1 tablet (500 mg total) by mouth 2 (two) times daily with meals. (Patient not taking: Reported on 4/22/2022)    nicotine (NICODERM CQ) 14 mg/24 hr Place 1 patch onto the skin  once daily.    nicotine, polacrilex, (NICORETTE) 2 mg Gum Take 1 each (2 mg total) by mouth as needed (use in place of cigarettes).    oxyCODONE-acetaminophen (PERCOCET)  mg per tablet daily as needed.    pulse oximeter (PULSE OXIMETER) device by Apply Externally route 2 (two) times a day. Use twice daily at 8 AM and 3 PM and record the value in MyChart as directed. (Patient not taking: Reported on 4/22/2022)    tiZANidine (ZANAFLEX) 4 MG tablet Take 4 mg by mouth daily as needed.    zolpidem (AMBIEN) 10 mg Tab Take 10 mg by mouth once daily.   Last reviewed on 5/15/2022  9:00 PM by Perri Schneider RN    Review of patient's allergies indicates:  No Known Allergies Last reviewed on  5/15/2022 9:00 PM by Perri Schneider      Tasks added this encounter   8/30/2022 - Refill Call (Auto Added)   Tasks due within next 3 months   No tasks due.     [facility]  [address]  [facility] [address]  Phone: [phone]  Fax: [phone]

## 2022-09-02 ENCOUNTER — PATIENT MESSAGE (OUTPATIENT)
Dept: PHARMACY | Facility: CLINIC | Age: 62
End: 2022-09-02
Payer: MEDICARE

## 2022-09-06 ENCOUNTER — SPECIALTY PHARMACY (OUTPATIENT)
Dept: PHARMACY | Facility: CLINIC | Age: 62
End: 2022-09-06
Payer: MEDICARE

## 2022-09-06 NOTE — TELEPHONE ENCOUNTER
Specialty Pharmacy - Refill Coordination    Specialty Medication Orders Linked to Encounter      Flowsheet Row Most Recent Value   Medication #1 evolocumab (REPATHA SURECLICK) 140 mg/mL PnIj (Order#[id number], Rx#[id number])          Refill Questions - Documented Responses      Flowsheet Row Most Recent Value   Patient Availability and HIPAA Verification    Does patient want to proceed with activity? Yes   HIPAA/medical authority confirmed? Yes   Relationship to patient of person spoken to? Self   Refill Screening Questions    Would patient like to speak to a pharmacist? No   When does the patient need to receive the medication? 09/09/22   Refill Delivery Questions    How will the patient receive the medication? Delivery Emily   When does the patient need to receive the medication? 09/09/22   Shipping Address Home   Address in [facility] confirmed and updated if neccessary? Yes   Expected Copay ($) 0   Is the patient able to afford the medication copay? Yes   Payment Method zero copay   Days supply of Refill 28   Supplies needed? Injection Device   Refill activity completed? Yes   Refill activity plan Refill scheduled   Shipment/Pickup Date: 09/07/22            Current Outpatient Medications   Medication Sig    aspirin (ECOTRIN) 81 MG EC tablet Take 81 mg by mouth once daily.    atorvastatin (LIPITOR) 40 MG tablet Take 1 tablet (40 mg total) by mouth once daily. (Patient not taking: Reported on 4/13/2021)    evolocumab (REPATHA SURECLICK) 140 mg/mL PnIj Inject 1 mL (140 mg total) into the skin every 14 (fourteen) days.    ezetimibe (ZETIA) 10 mg tablet TAKE 1 TABLET(10 MG) BY MOUTH EVERY DAY    losartan-hydrochlorothiazide 100-12.5 mg (HYZAAR) 100-12.5 mg Tab Take 1 tablet by mouth once daily.    naproxen (NAPROSYN) 500 MG tablet Take 1 tablet (500 mg total) by mouth 2 (two) times daily with meals. (Patient not taking: Reported on 4/22/2022)    nicotine (NICODERM CQ) 14 mg/24 hr Place 1 patch onto the skin  once daily.    nicotine, polacrilex, (NICORETTE) 2 mg Gum Take 1 each (2 mg total) by mouth as needed (use in place of cigarettes).    oxyCODONE-acetaminophen (PERCOCET)  mg per tablet daily as needed.    pulse oximeter (PULSE OXIMETER) device by Apply Externally route 2 (two) times a day. Use twice daily at 8 AM and 3 PM and record the value in MyChart as directed. (Patient not taking: Reported on 4/22/2022)    tiZANidine (ZANAFLEX) 4 MG tablet Take 4 mg by mouth daily as needed.    zolpidem (AMBIEN) 10 mg Tab Take 10 mg by mouth once daily.   Last reviewed on 5/15/2022  9:00 PM by Perri Schneider RN    Review of patient's allergies indicates:  No Known Allergies Last reviewed on  5/15/2022 9:00 PM by Perri Schneider      Tasks added this encounter   9/30/2022 - Refill Call (Auto Added)   Tasks due within next 3 months   No tasks due.     Nirav Pulido, PharmD  [facility]  [address]  [facility] [address]  Phone: [phone]  Fax: [phone]

## 2022-09-30 ENCOUNTER — SPECIALTY PHARMACY (OUTPATIENT)
Dept: PHARMACY | Facility: CLINIC | Age: 62
End: 2022-09-30
Payer: MEDICARE

## 2022-09-30 NOTE — TELEPHONE ENCOUNTER
Specialty Pharmacy - Refill Coordination    Specialty Medication Orders Linked to Encounter      Flowsheet Row Most Recent Value   Medication #1 evolocumab (REPATHA SURECLICK) 140 mg/mL PnIj (Order#[id number], Rx#[id number])            Refill Questions - Documented Responses      Flowsheet Row Most Recent Value   Patient Availability and HIPAA Verification    Does patient want to proceed with activity? Yes   HIPAA/medical authority confirmed? Yes   Relationship to patient of person spoken to? Self   Refill Screening Questions    Would patient like to speak to a pharmacist? No   When does the patient need to receive the medication? 10/07/22   Refill Delivery Questions    How will the patient receive the medication? MEDRx   When does the patient need to receive the medication? 10/07/22   Shipping Address Home   Address in [facility] confirmed and updated if neccessary? Yes   Expected Copay ($) 0   Is the patient able to afford the medication copay? Yes   Payment Method zero copay   Days supply of Refill 28   Supplies needed? No supplies needed   Refill activity completed? Yes   Refill activity plan Refill scheduled   Shipment/Pickup Date: 10/03/22            Current Outpatient Medications   Medication Sig    aspirin (ECOTRIN) 81 MG EC tablet Take 81 mg by mouth once daily.    atorvastatin (LIPITOR) 40 MG tablet Take 1 tablet (40 mg total) by mouth once daily. (Patient not taking: Reported on 4/13/2021)    evolocumab (REPATHA SURECLICK) 140 mg/mL PnIj Inject 1 mL (140 mg total) into the skin every 14 (fourteen) days.    ezetimibe (ZETIA) 10 mg tablet TAKE 1 TABLET(10 MG) BY MOUTH EVERY DAY    losartan-hydrochlorothiazide 100-12.5 mg (HYZAAR) 100-12.5 mg Tab Take 1 tablet by mouth once daily.    naproxen (NAPROSYN) 500 MG tablet Take 1 tablet (500 mg total) by mouth 2 (two) times daily with meals. (Patient not taking: Reported on 4/22/2022)    nicotine (NICODERM CQ) 14 mg/24 hr Place 1 patch onto the skin once  daily.    nicotine, polacrilex, (NICORETTE) 2 mg Gum Take 1 each (2 mg total) by mouth as needed (use in place of cigarettes).    oxyCODONE-acetaminophen (PERCOCET)  mg per tablet daily as needed.    pulse oximeter (PULSE OXIMETER) device by Apply Externally route 2 (two) times a day. Use twice daily at 8 AM and 3 PM and record the value in MyChart as directed. (Patient not taking: Reported on 4/22/2022)    tiZANidine (ZANAFLEX) 4 MG tablet Take 4 mg by mouth daily as needed.    zolpidem (AMBIEN) 10 mg Tab Take 10 mg by mouth once daily.   Last reviewed on 5/15/2022  9:00 PM by Perri Schneider RN    Review of patient's allergies indicates:  No Known Allergies Last reviewed on  5/15/2022 9:00 PM by Perri Schneider      Tasks added this encounter   10/28/2022 - Refill Call (Auto Added)   Tasks due within next 3 months   No tasks due.     [facility]  [address]  [facility] [address]  Phone: [phone]  Fax: [phone]

## 2022-10-07 ENCOUNTER — OFFICE VISIT (OUTPATIENT)
Dept: CARDIOLOGY | Facility: CLINIC | Age: 62
End: 2022-10-07
Payer: MEDICARE

## 2022-10-07 VITALS
BODY MASS INDEX: 28.51 KG/M2 | DIASTOLIC BLOOD PRESSURE: 60 MMHG | WEIGHT: 160.94 LBS | HEART RATE: 102 BPM | SYSTOLIC BLOOD PRESSURE: 122 MMHG

## 2022-10-07 DIAGNOSIS — I10 ESSENTIAL HYPERTENSION: ICD-10-CM

## 2022-10-07 DIAGNOSIS — E78.00 HYPERCHOLESTEROLEMIA: ICD-10-CM

## 2022-10-07 DIAGNOSIS — I65.22 STENOSIS OF LEFT CAROTID ARTERY: ICD-10-CM

## 2022-10-07 DIAGNOSIS — I25.118 ATHEROSCLEROSIS OF NATIVE CORONARY ARTERY OF NATIVE HEART WITH STABLE ANGINA PECTORIS: Primary | ICD-10-CM

## 2022-10-07 DIAGNOSIS — E11.59 TYPE 2 DIABETES MELLITUS WITH OTHER CIRCULATORY COMPLICATION, WITHOUT LONG-TERM CURRENT USE OF INSULIN: ICD-10-CM

## 2022-10-07 PROCEDURE — 93000 ELECTROCARDIOGRAM COMPLETE: CPT | Mod: S$GLB,,, | Performed by: INTERNAL MEDICINE

## 2022-10-07 PROCEDURE — 3008F BODY MASS INDEX DOCD: CPT | Mod: CPTII,S$GLB,, | Performed by: INTERNAL MEDICINE

## 2022-10-07 PROCEDURE — 1159F MED LIST DOCD IN RCRD: CPT | Mod: CPTII,S$GLB,, | Performed by: INTERNAL MEDICINE

## 2022-10-07 PROCEDURE — 99215 OFFICE O/P EST HI 40 MIN: CPT | Mod: S$GLB,,, | Performed by: INTERNAL MEDICINE

## 2022-10-07 PROCEDURE — 3074F SYST BP LT 130 MM HG: CPT | Mod: CPTII,S$GLB,, | Performed by: INTERNAL MEDICINE

## 2022-10-07 PROCEDURE — 3008F PR BODY MASS INDEX (BMI) DOCUMENTED: ICD-10-PCS | Mod: CPTII,S$GLB,, | Performed by: INTERNAL MEDICINE

## 2022-10-07 PROCEDURE — 99999 PR PBB SHADOW E&M-EST. PATIENT-LVL III: CPT | Mod: PBBFAC,,, | Performed by: INTERNAL MEDICINE

## 2022-10-07 PROCEDURE — 99999 PR PBB SHADOW E&M-EST. PATIENT-LVL III: ICD-10-PCS | Mod: PBBFAC,,, | Performed by: INTERNAL MEDICINE

## 2022-10-07 PROCEDURE — 3074F PR MOST RECENT SYSTOLIC BLOOD PRESSURE < 130 MM HG: ICD-10-PCS | Mod: CPTII,S$GLB,, | Performed by: INTERNAL MEDICINE

## 2022-10-07 PROCEDURE — 1160F RVW MEDS BY RX/DR IN RCRD: CPT | Mod: CPTII,S$GLB,, | Performed by: INTERNAL MEDICINE

## 2022-10-07 PROCEDURE — 1159F PR MEDICATION LIST DOCUMENTED IN MEDICAL RECORD: ICD-10-PCS | Mod: CPTII,S$GLB,, | Performed by: INTERNAL MEDICINE

## 2022-10-07 PROCEDURE — 1160F PR REVIEW ALL MEDS BY PRESCRIBER/CLIN PHARMACIST DOCUMENTED: ICD-10-PCS | Mod: CPTII,S$GLB,, | Performed by: INTERNAL MEDICINE

## 2022-10-07 PROCEDURE — 3078F DIAST BP <80 MM HG: CPT | Mod: CPTII,S$GLB,, | Performed by: INTERNAL MEDICINE

## 2022-10-07 PROCEDURE — 99215 PR OFFICE/OUTPT VISIT, EST, LEVL V, 40-54 MIN: ICD-10-PCS | Mod: S$GLB,,, | Performed by: INTERNAL MEDICINE

## 2022-10-07 PROCEDURE — 3078F PR MOST RECENT DIASTOLIC BLOOD PRESSURE < 80 MM HG: ICD-10-PCS | Mod: CPTII,S$GLB,, | Performed by: INTERNAL MEDICINE

## 2022-10-07 PROCEDURE — 93000 EKG 12-LEAD: ICD-10-PCS | Mod: S$GLB,,, | Performed by: INTERNAL MEDICINE

## 2022-10-07 NOTE — PROGRESS NOTES
[facility]    Chief Complaint  Chief Complaint   Patient presents with    Carotid Artery Disease       HPI:    She has been feeling well.  No complaints.  Leg pain remains her greatest limitation.  Smoking again.  Had success with Chantix but could not get it prescribed any further.  Gum and nicotine patches not working.    Medications  Current Outpatient Medications   Medication Sig Dispense Refill    aspirin (ECOTRIN) 81 MG EC tablet Take 81 mg by mouth once daily.      evolocumab (REPATHA SURECLICK) 140 mg/mL PnIj Inject 1 mL (140 mg total) into the skin every 14 (fourteen) days. 2 mL 11    losartan-hydrochlorothiazide 100-12.5 mg (HYZAAR) 100-12.5 mg Tab Take 1 tablet by mouth once daily.      naproxen (NAPROSYN) 500 MG tablet Take 1 tablet (500 mg total) by mouth 2 (two) times daily with meals. (Patient not taking: Reported on 4/22/2022) 30 tablet 0    oxyCODONE-acetaminophen (PERCOCET)  mg per tablet daily as needed.      pulse oximeter (PULSE OXIMETER) device by Apply Externally route 2 (two) times a day. Use twice daily at 8 AM and 3 PM and record the value in MyChart as directed. (Patient not taking: Reported on 4/22/2022) 1 each 0    tiZANidine (ZANAFLEX) 4 MG tablet Take 4 mg by mouth daily as needed.      zolpidem (AMBIEN) 10 mg Tab Take 10 mg by mouth once daily.       No current facility-administered medications for this visit.        History  Past Medical History:   Diagnosis Date    Atherosclerotic peripheral vascular disease     Carotid stenosis     Coronary atherosclerosis     RCA-    Diabetes mellitus, type 2     Diverticulitis large intestine 05/2014    Hypercholesterolemia     Hypertension      Past Surgical History:   Procedure Laterality Date    ILIAC ARTERY STENT Left 02/24/2010    Maria T 8 x 29 mm    ILIAC ARTERY STENT Right 02/17/2010    Maria T 8 x 60 mm    TUBAL LIGATION      TYMPANOTOMY  11/2018     Social History     Socioeconomic History    Marital status:     Tobacco Use    Smoking status: Every Day     Types: Cigarettes     Last attempt to quit: 2021     Years since quittin.9    Smokeless tobacco: Never   Substance and Sexual Activity    Alcohol use: Yes     Family History   Problem Relation Age of Onset    Coronary artery disease Mother     Coronary artery disease Father     Diabetes Father     Hypertension Sister     Hypertension Brother         Allergies  Review of patient's allergies indicates:  No Known Allergies    Review of Systems   Review of Systems   Constitutional: Negative for malaise/fatigue, weight gain and weight loss.   Eyes:  Negative for visual disturbance.   Cardiovascular:  Negative for chest pain, claudication, cyanosis, dyspnea on exertion, irregular heartbeat, leg swelling, near-syncope, orthopnea, palpitations, paroxysmal nocturnal dyspnea and syncope.   Respiratory:  Negative for cough, hemoptysis, shortness of breath, sleep disturbances due to breathing and wheezing.    Hematologic/Lymphatic: Negative for bleeding problem. Does not bruise/bleed easily.   Skin:  Negative for poor wound healing.   Musculoskeletal:  Negative for muscle cramps and myalgias.   Gastrointestinal:  Negative for abdominal pain, anorexia, diarrhea, heartburn, hematemesis, hematochezia, melena, nausea and vomiting.   Genitourinary:  Negative for hematuria and nocturia.   Neurological:  Negative for excessive daytime sleepiness, dizziness, focal weakness, light-headedness and weakness.     Physical Exam  Vitals:    10/07/22 1409   BP: 122/60   Pulse: 102     Wt Readings from Last 1 Encounters:   10/07/22 73 kg (160 lb 15 oz)     Physical Exam  Constitutional:       General: She is not in acute distress.     Appearance: She is not toxic-appearing or diaphoretic.   HENT:      Head: Normocephalic and atraumatic.   Eyes:      General: No scleral icterus.     Conjunctiva/sclera: Conjunctivae normal.   Neck:      Thyroid: No thyromegaly.      Vascular: No  hepatojugular reflux or JVD.      Trachea: No tracheal deviation.   Cardiovascular:      Rate and Rhythm: Normal rate and regular rhythm. No extrasystoles are present.     Chest Wall: PMI is not displaced.      Pulses:           Carotid pulses are 2+ on the right side and 2+ on the left side with bruit.       Radial pulses are 2+ on the right side and 2+ on the left side.        Dorsalis pedis pulses are 1+ on the right side and 1+ on the left side.        Posterior tibial pulses are 1+ on the right side and 1+ on the left side.      Heart sounds: S1 normal and S2 normal. No murmur heard.    Gallop present. S4 sounds present. No S3 sounds.   Pulmonary:      Effort: No accessory muscle usage or respiratory distress.      Breath sounds: No decreased breath sounds, wheezing, rhonchi or rales.   Abdominal:      General: Bowel sounds are normal. There is abdominal bruit.      Palpations: Abdomen is soft. There is no hepatomegaly, splenomegaly or pulsatile mass.      Tenderness: There is no abdominal tenderness.   Musculoskeletal:         General: No tenderness or deformity.      Cervical back: Neck supple.   Skin:     General: Skin is warm and dry.      Coloration: Skin is not pale.      Nails: There is no clubbing.   Neurological:      Mental Status: She is alert and oriented to person, place, and time.      Cranial Nerves: No cranial nerve deficit.      Sensory: No sensory deficit.   Psychiatric:         Speech: Speech normal.         Behavior: Behavior normal. Behavior is cooperative.       Labs  No visits with results within 6 Month(s) from this visit.   Latest known visit with results is:   Clinical Support on 10/27/2021   Component Date Value Ref Range Status    POC Blood, Urine 10/27/2021 Positive (A)  Negative Final    50    POC Bilirubin, Urine 10/27/2021 Negative  Negative Final    POC Urobilinogen, Urine 10/27/2021 normal  0.1 - 1.1 Final    POC Ketones, Urine 10/27/2021 Negative  Negative Final    POC  Protein, Urine 10/27/2021 Negative  Negative Final    POC Nitrates, Urine 10/27/2021 Negative  Negative Final    POC Glucose, Urine 10/27/2021 Negative  Negative Final    pH, UA 10/27/2021 5.5  5 - 8 Final    POC Specific Gravity, Urine 10/27/2021 1.015  1.003 - 1.029 Final    POC Leukocytes, Urine 10/27/2021 Positive (A)  Negative Final    10    Urine Culture, Routine 10/27/2021  (A)   Final                    Value:ESCHERICHIA COLI  50,000 - 99,999 cfu/ml         Imaging  No results found.    Assessment  1. Atherosclerosis of native coronary artery of native heart with stable angina pectoris  Chronic stable angina from small vessel disease.  Remote catheterization showed nonobstructive epicardial disease.  Will reassess with a stress test.    2. Stenosis of left carotid artery  Discordant findings on 2 most recent Dopplers    3. Essential hypertension  Controlled    4. Hypercholesterolemia  Reassess on Repatha    5. Type 2 diabetes mellitus with other circulatory complication, without long-term current use of insulin  Per primary MD      Plan and Discussion    Continue current guideline directed medical therapy.  Will prescribe Chantix to help with smoking cessation since it has been successful in the past.  Lipids need to be reassessed on Repatha.  Has to be done at [facility].  Carotid off on stress test need to be done at [facility].    The 10-year ASCVD risk score (Gorge DK, et al., 2019) is: 31.7%    Values used to calculate the score:      Age: 62 years      Sex: Female      Is Non- : Yes      Diabetic: Yes      Tobacco smoker: Yes      Systolic Blood Pressure: 122 mmHg      Is BP treated: Yes      HDL Cholesterol: 51 mg/dL      Total Cholesterol: 194 mg/dL     Follow Up  Follow up in about 6 months (around 4/7/2023).      Maurizio Kraus MD

## 2022-10-07 NOTE — PATIENT INSTRUCTIONS
Call [facility] Scheduling to set up tests ordered by :  1.Nuclear stress test  2.CTA neck    MAKE SURE TO GET FASTING LABS DONE BEFORE TESTS.

## 2022-10-17 RX ORDER — VARENICLINE TARTRATE 1 MG/1
1 TABLET, FILM COATED ORAL 2 TIMES DAILY
Qty: 60 TABLET | Refills: 3 | Status: SHIPPED | OUTPATIENT
Start: 2022-10-17 | End: 2023-10-20

## 2022-10-20 ENCOUNTER — CLINICAL SUPPORT (OUTPATIENT)
Dept: SMOKING CESSATION | Facility: CLINIC | Age: 62
End: 2022-10-20
Payer: COMMERCIAL

## 2022-10-20 DIAGNOSIS — F17.200 NICOTINE DEPENDENCE: Primary | ICD-10-CM

## 2022-10-20 PROCEDURE — 99407 BEHAV CHNG SMOKING > 10 MIN: CPT | Mod: S$GLB,,,

## 2022-10-20 PROCEDURE — 99407 PR TOBACCO USE CESSATION INTENSIVE >10 MINUTES: ICD-10-PCS | Mod: S$GLB,,,

## 2022-10-20 NOTE — PROGRESS NOTES
Spoke with patient today in regard to smoking cessation progress for 12 month telephone follow up, she states she started taking pills from her doctor and has not smoked in 3 days.  Patient states our program did not give her the pills and she is not interested in the program at this time.  Patient states she will quit with the pills from her doctor. Informed patient of benefit period and contact information if any further help or support is needed. Will complete smart form for 3/6/12 month follow up and resolve Quit attempt #1.

## 2022-10-28 ENCOUNTER — SPECIALTY PHARMACY (OUTPATIENT)
Dept: PHARMACY | Facility: CLINIC | Age: 62
End: 2022-10-28
Payer: MEDICARE

## 2022-10-28 DIAGNOSIS — E78.00 HYPERCHOLESTEROLEMIA: ICD-10-CM

## 2022-10-28 RX ORDER — EVOLOCUMAB 140 MG/ML
140 INJECTION, SOLUTION SUBCUTANEOUS
Qty: 2 ML | Refills: 11 | Status: SHIPPED | OUTPATIENT
Start: 2022-10-28 | End: 2022-10-28 | Stop reason: SDUPTHER

## 2022-10-28 RX ORDER — EVOLOCUMAB 140 MG/ML
140 INJECTION, SOLUTION SUBCUTANEOUS
Qty: 2 ML | Refills: 11 | Status: CANCELLED | OUTPATIENT
Start: 2022-10-28

## 2022-10-28 RX ORDER — EVOLOCUMAB 140 MG/ML
140 INJECTION, SOLUTION SUBCUTANEOUS
Qty: 2 ML | Refills: 11 | Status: CANCELLED | OUTPATIENT
Start: 2022-10-28 | End: 2022-11-25

## 2022-10-28 RX ORDER — EVOLOCUMAB 140 MG/ML
140 INJECTION, SOLUTION SUBCUTANEOUS
Qty: 2 ML | Refills: 11 | Status: SHIPPED | OUTPATIENT
Start: 2022-10-28 | End: 2023-10-02 | Stop reason: SDUPTHER

## 2022-10-31 NOTE — TELEPHONE ENCOUNTER
Specialty Pharmacy - Refill Coordination    Specialty Medication Orders Linked to Encounter      Flowsheet Row Most Recent Value   Medication #1 evolocumab (REPATHA SURECLICK) 140 mg/mL PnIj (Order#[id number], Rx#[id number])            Refill Questions - Documented Responses      Flowsheet Row Most Recent Value   Patient Availability and HIPAA Verification    Does patient want to proceed with activity? Yes   HIPAA/medical authority confirmed? Yes   Relationship to patient of person spoken to? Self   Refill Screening Questions    Would patient like to speak to a pharmacist? No   When does the patient need to receive the medication? 11/04/22   Refill Delivery Questions    How will the patient receive the medication? MEDRx   When does the patient need to receive the medication? 11/04/22   Shipping Address Home   Address in [facility] confirmed and updated if neccessary? Yes   Expected Copay ($) 0   Is the patient able to afford the medication copay? Yes   Payment Method zero copay   Days supply of Refill 28   Refill activity completed? Yes   Refill activity plan Refill scheduled   Shipment/Pickup Date: 11/01/22            Current Outpatient Medications   Medication Sig    aspirin (ECOTRIN) 81 MG EC tablet Take 81 mg by mouth once daily.    evolocumab (REPATHA SURECLICK) 140 mg/mL PnIj Inject 1 mL (140 mg total) into the skin every 14 (fourteen) days.    losartan-hydrochlorothiazide 100-12.5 mg (HYZAAR) 100-12.5 mg Tab Take 1 tablet by mouth once daily.    naproxen (NAPROSYN) 500 MG tablet Take 1 tablet (500 mg total) by mouth 2 (two) times daily with meals. (Patient not taking: Reported on 4/22/2022)    oxyCODONE-acetaminophen (PERCOCET)  mg per tablet daily as needed.    pulse oximeter (PULSE OXIMETER) device by Apply Externally route 2 (two) times a day. Use twice daily at 8 AM and 3 PM and record the value in MyChart as directed. (Patient not taking: Reported on 4/22/2022)    tiZANidine (ZANAFLEX) 4 MG  tablet Take 4 mg by mouth daily as needed.    varenicline (CHANTIX) 1 mg Tab Take 1 tablet (1 mg total) by mouth 2 (two) times daily.    zolpidem (AMBIEN) 10 mg Tab Take 10 mg by mouth once daily.   Last reviewed on 10/28/2022  2:51 PM by Yanna Hackett    Review of patient's allergies indicates:  No Known Allergies Last reviewed on  10/28/2022 2:51 PM by Yanna Hackett      Tasks added this encounter   11/25/2022 - Refill Call (Auto Added)   Tasks due within next 3 months   No tasks due.     Destiney Chandler, PharmD  [facility]  [address] [address]  Phone: [phone]  Fax: [phone]

## 2022-11-01 ENCOUNTER — TELEPHONE (OUTPATIENT)
Dept: CARDIOLOGY | Facility: CLINIC | Age: 62
End: 2022-11-01
Payer: MEDICARE

## 2022-11-01 NOTE — TELEPHONE ENCOUNTER
Patient is at [facility] getting a test with contrast. She's allergic to contrast. Please advise. Tin [phone]

## 2022-11-03 ENCOUNTER — TELEPHONE (OUTPATIENT)
Dept: CARDIOLOGY | Facility: CLINIC | Age: 62
End: 2022-11-03
Payer: MEDICARE

## 2022-11-03 NOTE — TELEPHONE ENCOUNTER
----- Message from Yanna Hackett sent at 11/3/2022  2:00 PM CDT -----  Regarding: FW: lab results    ----- Message -----  From: Esperanza Sadler  Sent: 11/2/2022   4:11 PM CDT  To: Efra Steven Staff  Subject: lab results                                      Name of Who is Calling:ANNALISA MCCORMACK [[id number]]          What is the request in detail: Pt is calling to discuss her lab results. Please call back to assist           Can the clinic reply by [facility]:no          What Number to Call Back if not in [facility]:[phone]

## 2022-11-25 ENCOUNTER — SPECIALTY PHARMACY (OUTPATIENT)
Dept: PHARMACY | Facility: CLINIC | Age: 62
End: 2022-11-25
Payer: MEDICARE

## 2022-11-25 NOTE — TELEPHONE ENCOUNTER
Specialty Pharmacy - Refill Coordination    Specialty Medication Orders Linked to Encounter      Flowsheet Row Most Recent Value   Medication #1 evolocumab (REPATHA SURECLICK) 140 mg/mL PnIj (Order#[id number], Rx#[id number])            Refill Questions - Documented Responses      Flowsheet Row Most Recent Value   Patient Availability and HIPAA Verification    Does patient want to proceed with activity? Yes   HIPAA/medical authority confirmed? Yes   Relationship to patient of person spoken to? Self   Refill Screening Questions    Would patient like to speak to a pharmacist? No   When does the patient need to receive the medication? 12/02/22   Refill Delivery Questions    How will the patient receive the medication? MEDRx   When does the patient need to receive the medication? 12/02/22   Shipping Address Home   Address in [facility] confirmed and updated if neccessary? Yes   Expected Copay ($) 0   Is the patient able to afford the medication copay? Yes   Payment Method zero copay   Days supply of Refill 28   Refill activity completed? Yes   Refill activity plan Refill scheduled   Shipment/Pickup Date: 11/29/22            Current Outpatient Medications   Medication Sig    aspirin (ECOTRIN) 81 MG EC tablet Take 81 mg by mouth once daily.    evolocumab (REPATHA SURECLICK) 140 mg/mL PnIj Inject 1 mL (140 mg total) into the skin every 14 (fourteen) days.    losartan-hydrochlorothiazide 100-12.5 mg (HYZAAR) 100-12.5 mg Tab Take 1 tablet by mouth once daily.    naproxen (NAPROSYN) 500 MG tablet Take 1 tablet (500 mg total) by mouth 2 (two) times daily with meals. (Patient not taking: Reported on 4/22/2022)    oxyCODONE-acetaminophen (PERCOCET)  mg per tablet daily as needed.    pulse oximeter (PULSE OXIMETER) device by Apply Externally route 2 (two) times a day. Use twice daily at 8 AM and 3 PM and record the value in MyChart as directed. (Patient not taking: Reported on 4/22/2022)    tiZANidine (ZANAFLEX) 4 MG  tablet Take 4 mg by mouth daily as needed.    varenicline (CHANTIX) 1 mg Tab Take 1 tablet (1 mg total) by mouth 2 (two) times daily.    zolpidem (AMBIEN) 10 mg Tab Take 10 mg by mouth once daily.   Last reviewed on 10/28/2022  2:51 PM by Yanna Hackett    Review of patient's allergies indicates:  No Known Allergies Last reviewed on  10/28/2022 2:51 PM by Yanna Hackett      Tasks added this encounter   12/23/2022 - Refill Call (Auto Added)   Tasks due within next 3 months   No tasks due.     Jeaneth Vazquez, PharmD  [facility]  [address] [facility] [address]  Phone: [phone]  Fax: [phone]

## 2022-12-23 ENCOUNTER — SPECIALTY PHARMACY (OUTPATIENT)
Dept: PHARMACY | Facility: CLINIC | Age: 62
End: 2022-12-23
Payer: MEDICARE

## 2022-12-23 NOTE — TELEPHONE ENCOUNTER
Specialty Pharmacy - Refill Coordination    Specialty Medication Orders Linked to Encounter      Flowsheet Row Most Recent Value   Medication #1 evolocumab (REPATHA SURECLICK) 140 mg/mL PnIj (Order#[id number], Rx#[id number])            Refill Questions - Documented Responses      Flowsheet Row Most Recent Value   Patient Availability and HIPAA Verification    Does patient want to proceed with activity? Yes   HIPAA/medical authority confirmed? Yes   Relationship to patient of person spoken to? Self   Refill Screening Questions    Would patient like to speak to a pharmacist? No   When does the patient need to receive the medication? 12/30/22   Refill Delivery Questions    How will the patient receive the medication? MEDRx   When does the patient need to receive the medication? 12/30/22   Shipping Address Home   Address in [facility] confirmed and updated if neccessary? Yes   Expected Copay ($) 0   Is the patient able to afford the medication copay? Yes   Payment Method zero copay   Days supply of Refill 28   Supplies needed? No supplies needed   Refill activity completed? Yes   Refill activity plan Refill scheduled   Shipment/Pickup Date: 12/28/22            Current Outpatient Medications   Medication Sig    aspirin (ECOTRIN) 81 MG EC tablet Take 81 mg by mouth once daily.    evolocumab (REPATHA SURECLICK) 140 mg/mL PnIj Inject 1 mL (140 mg total) into the skin every 14 (fourteen) days.    losartan-hydrochlorothiazide 100-12.5 mg (HYZAAR) 100-12.5 mg Tab Take 1 tablet by mouth once daily.    naproxen (NAPROSYN) 500 MG tablet Take 1 tablet (500 mg total) by mouth 2 (two) times daily with meals. (Patient not taking: Reported on 4/22/2022)    oxyCODONE-acetaminophen (PERCOCET)  mg per tablet daily as needed.    pulse oximeter (PULSE OXIMETER) device by Apply Externally route 2 (two) times a day. Use twice daily at 8 AM and 3 PM and record the value in [email or web address] as directed. (Patient not taking: Reported on  4/22/2022)    tiZANidine (ZANAFLEX) 4 MG tablet Take 4 mg by mouth daily as needed.    varenicline (CHANTIX) 1 mg Tab Take 1 tablet (1 mg total) by mouth 2 (two) times daily.    zolpidem (AMBIEN) 10 mg Tab Take 10 mg by mouth once daily.   Last reviewed on 10/28/2022  2:51 PM by Yanna Hackett    Review of patient's allergies indicates:  No Known Allergies Last reviewed on  10/28/2022 2:51 PM by Yanna Hackett      Tasks added this encounter   1/20/2023 - Refill Call (Auto Added)   Tasks due within next 3 months   No tasks due.     [facility]  [address] [facility] [address]  Phone: [phone]  Fax: [phone]

## 2023-01-24 ENCOUNTER — SPECIALTY PHARMACY (OUTPATIENT)
Dept: PHARMACY | Facility: CLINIC | Age: 63
End: 2023-01-24
Payer: MEDICARE

## 2023-01-24 NOTE — TELEPHONE ENCOUNTER
Specialty Pharmacy - Refill Coordination    Specialty Medication Orders Linked to Encounter      Flowsheet Row Most Recent Value   Medication #1 evolocumab (REPATHA SURECLICK) 140 mg/mL PnIj (Order#[id number], Rx#[id number])            Refill Questions - Documented Responses      Flowsheet Row Most Recent Value   Patient Availability and HIPAA Verification    Does patient want to proceed with activity? Yes   HIPAA/medical authority confirmed? Yes   Relationship to patient of person spoken to? Self   Refill Screening Questions    Would patient like to speak to a pharmacist? No   When does the patient need to receive the medication? 01/31/23   Refill Delivery Questions    How will the patient receive the medication? MEDRx   When does the patient need to receive the medication? 01/31/23   Shipping Address Home   Address in [facility] confirmed and updated if neccessary? Yes   Expected Copay ($) 0   Is the patient able to afford the medication copay? Yes   Payment Method zero copay   Days supply of Refill 28   Supplies needed? Alcohol Swabs   Refill activity completed? Yes   Refill activity plan Refill scheduled   Shipment/Pickup Date: 01/26/23            Current Outpatient Medications   Medication Sig    aspirin (ECOTRIN) 81 MG EC tablet Take 81 mg by mouth once daily.    evolocumab (REPATHA SURECLICK) 140 mg/mL PnIj Inject 1 mL (140 mg total) into the skin every 14 (fourteen) days.    losartan-hydrochlorothiazide 100-12.5 mg (HYZAAR) 100-12.5 mg Tab Take 1 tablet by mouth once daily.    naproxen (NAPROSYN) 500 MG tablet Take 1 tablet (500 mg total) by mouth 2 (two) times daily with meals. (Patient not taking: Reported on 4/22/2022)    oxyCODONE-acetaminophen (PERCOCET)  mg per tablet daily as needed.    pulse oximeter (PULSE OXIMETER) device by Apply Externally route 2 (two) times a day. Use twice daily at 8 AM and 3 PM and record the value in [employer] as directed. (Patient not taking: Reported on  4/22/2022)    tiZANidine (ZANAFLEX) 4 MG tablet Take 4 mg by mouth daily as needed.    varenicline (CHANTIX) 1 mg Tab Take 1 tablet (1 mg total) by mouth 2 (two) times daily.    zolpidem (AMBIEN) 10 mg Tab Take 10 mg by mouth once daily.   Last reviewed on 10/28/2022  2:51 PM by Yanna Hackett    Review of patient's allergies indicates:  No Known Allergies Last reviewed on  10/28/2022 2:51 PM by Yanna Hackett      Tasks added this encounter   2/21/2023 - Refill Call (Auto Added)   Tasks due within next 3 months   No tasks due.     Jl Campos, PharmD  [facility]  [facility] [facility] [address]  Phone: [phone]  Fax: [phone]

## 2023-02-22 ENCOUNTER — SPECIALTY PHARMACY (OUTPATIENT)
Dept: PHARMACY | Facility: CLINIC | Age: 63
End: 2023-02-22
Payer: MEDICARE

## 2023-02-22 NOTE — TELEPHONE ENCOUNTER
Specialty Pharmacy - Refill Coordination    Specialty Medication Orders Linked to Encounter      Flowsheet Row Most Recent Value   Medication #1 evolocumab (REPATHA SURECLICK) 140 mg/mL PnIj (Order#[id number], Rx#[id number])            Refill Questions - Documented Responses      Flowsheet Row Most Recent Value   Patient Availability and HIPAA Verification    Does patient want to proceed with activity? Yes   HIPAA/medical authority confirmed? Yes   Relationship to patient of person spoken to? Self   Refill Screening Questions    Would patient like to speak to a pharmacist? No   When does the patient need to receive the medication? 02/28/23   Refill Delivery Questions    How will the patient receive the medication? MEDRx   When does the patient need to receive the medication? 02/28/23   Shipping Address Home   Address in [facility] confirmed and updated if neccessary? Yes   Expected Copay ($) 0   Is the patient able to afford the medication copay? Yes   Payment Method zero copay   Days supply of Refill 28   Supplies needed? No supplies needed   Refill activity completed? Yes   Refill activity plan Refill scheduled   Shipment/Pickup Date: 02/23/23            Current Outpatient Medications   Medication Sig    aspirin (ECOTRIN) 81 MG EC tablet Take 81 mg by mouth once daily.    evolocumab (REPATHA SURECLICK) 140 mg/mL PnIj Inject 1 mL (140 mg total) into the skin every 14 (fourteen) days.    losartan-hydrochlorothiazide 100-12.5 mg (HYZAAR) 100-12.5 mg Tab Take 1 tablet by mouth once daily.    naproxen (NAPROSYN) 500 MG tablet Take 1 tablet (500 mg total) by mouth 2 (two) times daily with meals. (Patient not taking: Reported on 4/22/2022)    oxyCODONE-acetaminophen (PERCOCET)  mg per tablet daily as needed.    pulse oximeter (PULSE OXIMETER) device by Apply Externally route 2 (two) times a day. Use twice daily at 8 AM and 3 PM and record the value in [employer] as directed. (Patient not taking: Reported on  4/22/2022)    tiZANidine (ZANAFLEX) 4 MG tablet Take 4 mg by mouth daily as needed.    varenicline (CHANTIX) 1 mg Tab Take 1 tablet (1 mg total) by mouth 2 (two) times daily.    zolpidem (AMBIEN) 10 mg Tab Take 10 mg by mouth once daily.   Last reviewed on 10/28/2022  2:51 PM by Yanna Hackett    Review of patient's allergies indicates:  No Known Allergies Last reviewed on  10/28/2022 2:51 PM by Yanna Hackett      Tasks added this encounter   3/21/2023 - Refill Call (Auto Added)   Tasks due within next 3 months   No tasks due.     Zandra Thomas, Patient Care Assistant  [facility]  [address]  [facility] [address]  Phone: [phone]  Fax: [phone]

## 2023-03-17 ENCOUNTER — PATIENT MESSAGE (OUTPATIENT)
Dept: INFECTIOUS DISEASES | Facility: CLINIC | Age: 63
End: 2023-03-17
Payer: MEDICARE

## 2023-03-21 ENCOUNTER — SPECIALTY PHARMACY (OUTPATIENT)
Dept: PHARMACY | Facility: CLINIC | Age: 63
End: 2023-03-21
Payer: MEDICARE

## 2023-03-21 NOTE — TELEPHONE ENCOUNTER
Specialty Pharmacy - Refill Coordination    Specialty Medication Orders Linked to Encounter      Flowsheet Row Most Recent Value   Medication #1 evolocumab (REPATHA SURECLICK) 140 mg/mL PnIj (Order#[id number], Rx#[id number])            Refill Questions - Documented Responses      Flowsheet Row Most Recent Value   Patient Availability and HIPAA Verification    Does patient want to proceed with activity? Yes   HIPAA/medical authority confirmed? Yes   Relationship to patient of person spoken to? Self   Refill Screening Questions    Would patient like to speak to a pharmacist? No   When does the patient need to receive the medication? 03/29/23   Refill Delivery Questions    How will the patient receive the medication? MEDRx   When does the patient need to receive the medication? 03/29/23   Shipping Address Home   Address in [facility] confirmed and updated if neccessary? Yes   Expected Copay ($) 0   Is the patient able to afford the medication copay? Yes   Payment Method zero copay   Days supply of Refill 28   Supplies needed? No supplies needed   Refill activity completed? Yes   Refill activity plan Refill scheduled   Shipment/Pickup Date: 03/27/23            Current Outpatient Medications   Medication Sig    aspirin (ECOTRIN) 81 MG EC tablet Take 81 mg by mouth once daily.    evolocumab (REPATHA SURECLICK) 140 mg/mL PnIj Inject 1 mL (140 mg total) into the skin every 14 (fourteen) days.    losartan-hydrochlorothiazide 100-12.5 mg (HYZAAR) 100-12.5 mg Tab Take 1 tablet by mouth once daily.    naproxen (NAPROSYN) 500 MG tablet Take 1 tablet (500 mg total) by mouth 2 (two) times daily with meals. (Patient not taking: Reported on 4/22/2022)    oxyCODONE-acetaminophen (PERCOCET)  mg per tablet daily as needed.    pulse oximeter (PULSE OXIMETER) device by Apply Externally route 2 (two) times a day. Use twice daily at 8 AM and 3 PM and record the value in [employer] as directed. (Patient not taking: Reported on  4/22/2022)    tiZANidine (ZANAFLEX) 4 MG tablet Take 4 mg by mouth daily as needed.    varenicline (CHANTIX) 1 mg Tab Take 1 tablet (1 mg total) by mouth 2 (two) times daily.    zolpidem (AMBIEN) 10 mg Tab Take 10 mg by mouth once daily.   Last reviewed on 10/28/2022  2:51 PM by Yanna Hackett    Review of patient's allergies indicates:  No Known Allergies Last reviewed on  10/28/2022 2:51 PM by Yanna Hackett      Tasks added this encounter   4/19/2023 - Refill Call (Auto Added)   Tasks due within next 3 months   No tasks due.     [facility]  [address] [facility] [address]  Phone: [phone]  Fax: [phone]

## 2023-04-19 ENCOUNTER — SPECIALTY PHARMACY (OUTPATIENT)
Dept: PHARMACY | Facility: CLINIC | Age: 63
End: 2023-04-19
Payer: MEDICARE

## 2023-04-19 NOTE — TELEPHONE ENCOUNTER
Specialty Pharmacy - Refill Coordination    Specialty Medication Orders Linked to Encounter      Flowsheet Row Most Recent Value   Medication #1 evolocumab (REPATHA SURECLICK) 140 mg/mL PnIj (Order#[id number], Rx#[id number])            Refill Questions - Documented Responses      Flowsheet Row Most Recent Value   Patient Availability and HIPAA Verification    Does patient want to proceed with activity? Yes   HIPAA/medical authority confirmed? Yes   Relationship to patient of person spoken to? Self   Refill Screening Questions    Would patient like to speak to a pharmacist? No   When does the patient need to receive the medication? 04/26/23   Refill Delivery Questions    How will the patient receive the medication? MEDRx   When does the patient need to receive the medication? 04/26/23   Shipping Address Home   Address in [facility] confirmed and updated if neccessary? Yes   Expected Copay ($) 0   Is the patient able to afford the medication copay? Yes   Payment Method zero copay   Days supply of Refill 28   Supplies needed? No supplies needed   Refill activity completed? Yes   Refill activity plan Refill scheduled   Shipment/Pickup Date: 04/21/23            Current Outpatient Medications   Medication Sig    aspirin (ECOTRIN) 81 MG EC tablet Take 81 mg by mouth once daily.    evolocumab (REPATHA SURECLICK) 140 mg/mL PnIj Inject 1 mL (140 mg total) into the skin every 14 (fourteen) days.    losartan-hydrochlorothiazide 100-12.5 mg (HYZAAR) 100-12.5 mg Tab Take 1 tablet by mouth once daily.    naproxen (NAPROSYN) 500 MG tablet Take 1 tablet (500 mg total) by mouth 2 (two) times daily with meals. (Patient not taking: Reported on 4/22/2022)    oxyCODONE-acetaminophen (PERCOCET)  mg per tablet daily as needed.    pulse oximeter (PULSE OXIMETER) device by Apply Externally route 2 (two) times a day. Use twice daily at 8 AM and 3 PM and record the value in MyChart as directed. (Patient not taking: Reported on  4/22/2022)    tiZANidine (ZANAFLEX) 4 MG tablet Take 4 mg by mouth daily as needed.    varenicline (CHANTIX) 1 mg Tab Take 1 tablet (1 mg total) by mouth 2 (two) times daily.    zolpidem (AMBIEN) 10 mg Tab Take 10 mg by mouth once daily.   Last reviewed on 10/28/2022  2:51 PM by Yanna Hackett    Review of patient's allergies indicates:  No Known Allergies Last reviewed on  10/28/2022 2:51 PM by Yanna Hackett      Tasks added this encounter   5/17/2023 - Refill Call (Auto Added)   Tasks due within next 3 months   No tasks due.     [facility]  [address] [facility] [address]  Phone: [phone]  Fax: [phone]

## 2023-04-21 ENCOUNTER — OFFICE VISIT (OUTPATIENT)
Dept: CARDIOLOGY | Facility: CLINIC | Age: 63
End: 2023-04-21
Payer: MEDICARE

## 2023-04-21 VITALS
HEART RATE: 79 BPM | WEIGHT: 165.38 LBS | BODY MASS INDEX: 29.29 KG/M2 | OXYGEN SATURATION: 99 % | DIASTOLIC BLOOD PRESSURE: 82 MMHG | SYSTOLIC BLOOD PRESSURE: 150 MMHG

## 2023-04-21 DIAGNOSIS — E11.59 TYPE 2 DIABETES MELLITUS WITH OTHER CIRCULATORY COMPLICATION, WITHOUT LONG-TERM CURRENT USE OF INSULIN: ICD-10-CM

## 2023-04-21 DIAGNOSIS — I70.209 ATHEROSCLEROTIC PERIPHERAL VASCULAR DISEASE: ICD-10-CM

## 2023-04-21 DIAGNOSIS — I10 PRIMARY HYPERTENSION: ICD-10-CM

## 2023-04-21 DIAGNOSIS — I65.22 STENOSIS OF LEFT CAROTID ARTERY: ICD-10-CM

## 2023-04-21 DIAGNOSIS — I25.118 ATHEROSCLEROSIS OF NATIVE CORONARY ARTERY OF NATIVE HEART WITH STABLE ANGINA PECTORIS: Primary | ICD-10-CM

## 2023-04-21 DIAGNOSIS — E78.00 HYPERCHOLESTEROLEMIA: ICD-10-CM

## 2023-04-21 PROCEDURE — 99214 OFFICE O/P EST MOD 30 MIN: CPT | Mod: S$GLB,,, | Performed by: INTERNAL MEDICINE

## 2023-04-21 PROCEDURE — 1101F PR PT FALLS ASSESS DOC 0-1 FALLS W/OUT INJ PAST YR: ICD-10-PCS | Mod: CPTII,S$GLB,, | Performed by: INTERNAL MEDICINE

## 2023-04-21 PROCEDURE — 1159F MED LIST DOCD IN RCRD: CPT | Mod: CPTII,S$GLB,, | Performed by: INTERNAL MEDICINE

## 2023-04-21 PROCEDURE — 3008F PR BODY MASS INDEX (BMI) DOCUMENTED: ICD-10-PCS | Mod: CPTII,S$GLB,, | Performed by: INTERNAL MEDICINE

## 2023-04-21 PROCEDURE — 99999 PR PBB SHADOW E&M-EST. PATIENT-LVL III: CPT | Mod: PBBFAC,,, | Performed by: INTERNAL MEDICINE

## 2023-04-21 PROCEDURE — 99999 PR PBB SHADOW E&M-EST. PATIENT-LVL III: ICD-10-PCS | Mod: PBBFAC,,, | Performed by: INTERNAL MEDICINE

## 2023-04-21 PROCEDURE — 3079F DIAST BP 80-89 MM HG: CPT | Mod: CPTII,S$GLB,, | Performed by: INTERNAL MEDICINE

## 2023-04-21 PROCEDURE — 1159F PR MEDICATION LIST DOCUMENTED IN MEDICAL RECORD: ICD-10-PCS | Mod: CPTII,S$GLB,, | Performed by: INTERNAL MEDICINE

## 2023-04-21 PROCEDURE — 1160F PR REVIEW ALL MEDS BY PRESCRIBER/CLIN PHARMACIST DOCUMENTED: ICD-10-PCS | Mod: CPTII,S$GLB,, | Performed by: INTERNAL MEDICINE

## 2023-04-21 PROCEDURE — 99214 PR OFFICE/OUTPT VISIT, EST, LEVL IV, 30-39 MIN: ICD-10-PCS | Mod: S$GLB,,, | Performed by: INTERNAL MEDICINE

## 2023-04-21 PROCEDURE — 3288F FALL RISK ASSESSMENT DOCD: CPT | Mod: CPTII,S$GLB,, | Performed by: INTERNAL MEDICINE

## 2023-04-21 PROCEDURE — 3008F BODY MASS INDEX DOCD: CPT | Mod: CPTII,S$GLB,, | Performed by: INTERNAL MEDICINE

## 2023-04-21 PROCEDURE — 1160F RVW MEDS BY RX/DR IN RCRD: CPT | Mod: CPTII,S$GLB,, | Performed by: INTERNAL MEDICINE

## 2023-04-21 PROCEDURE — 3077F PR MOST RECENT SYSTOLIC BLOOD PRESSURE >= 140 MM HG: ICD-10-PCS | Mod: CPTII,S$GLB,, | Performed by: INTERNAL MEDICINE

## 2023-04-21 PROCEDURE — 3079F PR MOST RECENT DIASTOLIC BLOOD PRESSURE 80-89 MM HG: ICD-10-PCS | Mod: CPTII,S$GLB,, | Performed by: INTERNAL MEDICINE

## 2023-04-21 PROCEDURE — 1101F PT FALLS ASSESS-DOCD LE1/YR: CPT | Mod: CPTII,S$GLB,, | Performed by: INTERNAL MEDICINE

## 2023-04-21 PROCEDURE — 3288F PR FALLS RISK ASSESSMENT DOCUMENTED: ICD-10-PCS | Mod: CPTII,S$GLB,, | Performed by: INTERNAL MEDICINE

## 2023-04-21 PROCEDURE — 3077F SYST BP >= 140 MM HG: CPT | Mod: CPTII,S$GLB,, | Performed by: INTERNAL MEDICINE

## 2023-04-21 NOTE — PROGRESS NOTES
[facility]    Chief Complaint  Chief Complaint   Patient presents with    Coronary Artery Disease       HPI:    No new issues.  Took Chantix for while which helped with smoking.  But, she felt that it caused issues with her mood.  So she stopped.  Smoking again.  Fatigued.  No problems with exertional dyspnea or chest discomfort.  Not taking her blood pressure medications with any regularity.    Medications  Current Outpatient Medications   Medication Sig Dispense Refill    aspirin (ECOTRIN) 81 MG EC tablet Take 81 mg by mouth once daily.      evolocumab (REPATHA SURECLICK) 140 mg/mL PnIj Inject 1 mL (140 mg total) into the skin every 14 (fourteen) days. 2 mL 11    losartan-hydrochlorothiazide 100-12.5 mg (HYZAAR) 100-12.5 mg Tab Take 1 tablet by mouth once daily.      oxyCODONE-acetaminophen (PERCOCET)  mg per tablet daily as needed.      varenicline (CHANTIX) 1 mg Tab Take 1 tablet (1 mg total) by mouth 2 (two) times daily. 60 tablet 3    zolpidem (AMBIEN) 10 mg Tab Take 10 mg by mouth once daily.       No current facility-administered medications for this visit.        History  Past Medical History:   Diagnosis Date    Atherosclerotic peripheral vascular disease     Carotid stenosis     Coronary atherosclerosis     RCA-    Diabetes mellitus, type 2     Diverticulitis large intestine 2014    Hypercholesterolemia     Hypertension      Past Surgical History:   Procedure Laterality Date    ILIAC ARTERY STENT Left 2010    Maria T 8 x 29 mm    ILIAC ARTERY STENT Right 2010    Maria T 8 x 60 mm    TUBAL LIGATION      TYMPANOTOMY  2018     Social History     Socioeconomic History    Marital status:    Tobacco Use    Smoking status: Every Day     Types: Cigarettes     Last attempt to quit: 2021     Years since quittin.4    Smokeless tobacco: Never   Substance and Sexual Activity    Alcohol use: Yes     Family History   Problem Relation Age of Onset    Coronary  artery disease Mother     Coronary artery disease Father     Diabetes Father     Hypertension Sister     Hypertension Brother         Allergies  Review of patient's allergies indicates:  No Known Allergies    Review of Systems   Review of Systems   Constitutional: Negative for malaise/fatigue, weight gain and weight loss.   Eyes:  Negative for visual disturbance.   Cardiovascular:  Negative for chest pain, claudication, cyanosis, dyspnea on exertion, irregular heartbeat, leg swelling, near-syncope, orthopnea, palpitations, paroxysmal nocturnal dyspnea and syncope.   Respiratory:  Negative for cough, hemoptysis, shortness of breath, sleep disturbances due to breathing and wheezing.    Hematologic/Lymphatic: Negative for bleeding problem. Does not bruise/bleed easily.   Skin:  Negative for poor wound healing.   Musculoskeletal:  Negative for muscle cramps and myalgias.   Gastrointestinal:  Negative for abdominal pain, anorexia, diarrhea, heartburn, hematemesis, hematochezia, melena, nausea and vomiting.   Genitourinary:  Negative for hematuria and nocturia.   Neurological:  Negative for excessive daytime sleepiness, dizziness, focal weakness, light-headedness and weakness.     Physical Exam  Vitals:    04/21/23 1130   BP: (!) 150/82   Pulse: 79     Wt Readings from Last 1 Encounters:   04/21/23 75 kg (165 lb 5.5 oz)     Physical Exam  Constitutional:       General: She is not in acute distress.     Appearance: She is not toxic-appearing or diaphoretic.   HENT:      Head: Normocephalic and atraumatic.   Eyes:      General: No scleral icterus.     Conjunctiva/sclera: Conjunctivae normal.   Neck:      Thyroid: No thyromegaly.      Vascular: No hepatojugular reflux or JVD.      Trachea: No tracheal deviation.   Cardiovascular:      Rate and Rhythm: Normal rate and regular rhythm. No extrasystoles are present.     Chest Wall: PMI is not displaced.      Pulses:           Carotid pulses are 2+ on the right side and 2+ on the  left side with bruit.       Radial pulses are 2+ on the right side and 2+ on the left side.        Dorsalis pedis pulses are 1+ on the right side and 1+ on the left side.        Posterior tibial pulses are 1+ on the right side and 1+ on the left side.      Heart sounds: S1 normal and S2 normal. No murmur heard.    Gallop present. S4 sounds present. No S3 sounds.   Pulmonary:      Effort: No accessory muscle usage or respiratory distress.      Breath sounds: No decreased breath sounds, wheezing, rhonchi or rales.   Abdominal:      General: Bowel sounds are normal. There is abdominal bruit.      Palpations: Abdomen is soft. There is no hepatomegaly, splenomegaly or pulsatile mass.      Tenderness: There is no abdominal tenderness.   Musculoskeletal:         General: No tenderness or deformity.      Cervical back: Neck supple.   Skin:     General: Skin is warm and dry.      Coloration: Skin is not pale.      Nails: There is no clubbing.   Neurological:      Mental Status: She is alert and oriented to person, place, and time.      Cranial Nerves: No cranial nerve deficit.      Sensory: No sensory deficit.   Psychiatric:         Speech: Speech normal.         Behavior: Behavior normal. Behavior is cooperative.       Labs  No visits with results within 6 Month(s) from this visit.   Latest known visit with results is:   Clinical Support on 10/27/2021   Component Date Value Ref Range Status    POC Blood, Urine 10/27/2021 Positive (A)  Negative Final    50    POC Bilirubin, Urine 10/27/2021 Negative  Negative Final    POC Urobilinogen, Urine 10/27/2021 normal  0.1 - 1.1 Final    POC Ketones, Urine 10/27/2021 Negative  Negative Final    POC Protein, Urine 10/27/2021 Negative  Negative Final    POC Nitrates, Urine 10/27/2021 Negative  Negative Final    POC Glucose, Urine 10/27/2021 Negative  Negative Final    pH, UA 10/27/2021 5.5  5 - 8 Final    POC Specific Gravity, Urine 10/27/2021 1.015  1.003 - 1.029 Final    POC  Leukocytes, Urine 10/27/2021 Positive (A)  Negative Final    10    Urine Culture, Routine 10/27/2021  (A)   Final                    Value:ESCHERICHIA COLI  50,000 - 99,999 cfu/ml         Imaging  No results found.    Assessment  1. Atherosclerosis of native coronary artery of native heart with stable angina pectoris  Stable on medical therapy.  Negative stress test at [facility] in November    2. Stenosis of left carotid artery  Less than 70% by CTA at [facility] in November    3. Primary hypertension  Uncontrolled but not taking her medications    4. Hypercholesterolemia  Well controlled with Repatha    5. Type 2 diabetes mellitus with other circulatory complication, without long-term current use of insulin  Hemoglobin A1c 6    6. Atherosclerotic peripheral vascular disease  Asymptomatic      Plan and Discussion    Continue with current guideline directed medical therapy.  Adherence to antihypertensive regimen was encouraged.  Smoking cessation discussed.    The 10-year ASCVD risk score (Gorge DK, et al., 2019) is: 48.8%    Values used to calculate the score:      Age: 63 years      Sex: Female      Is Non- : Yes      Diabetic: Yes      Tobacco smoker: Yes      Systolic Blood Pressure: 150 mmHg      Is BP treated: Yes      HDL Cholesterol: 51 mg/dL      Total Cholesterol: 194 mg/dL     Follow Up  Follow up in about 6 months (around 10/21/2023).      Maurizio Kraus MD

## 2023-05-09 ENCOUNTER — HOSPITAL ENCOUNTER (OUTPATIENT)
Facility: HOSPITAL | Age: 63
Discharge: HOME | End: 2023-05-09
Attending: EMERGENCY MEDICINE | Admitting: INTERNAL MEDICINE
Payer: MEDICARE

## 2023-05-09 VITALS
HEIGHT: 63 IN | WEIGHT: 165 LBS | OXYGEN SATURATION: 97 % | TEMPERATURE: 98 F | DIASTOLIC BLOOD PRESSURE: 60 MMHG | RESPIRATION RATE: 16 BRPM | SYSTOLIC BLOOD PRESSURE: 132 MMHG | BODY MASS INDEX: 29.23 KG/M2 | HEART RATE: 75 BPM

## 2023-05-09 DIAGNOSIS — M70.62 TROCHANTERIC BURSITIS OF LEFT HIP: Primary | ICD-10-CM

## 2023-05-09 DIAGNOSIS — M79.652 LEFT THIGH PAIN: ICD-10-CM

## 2023-05-09 DIAGNOSIS — M25.552 LEFT HIP PAIN: ICD-10-CM

## 2023-05-09 DIAGNOSIS — R07.9 CHEST PAIN: ICD-10-CM

## 2023-05-09 LAB
ALBUMIN SERPL BCP-MCNC: 3.6 G/DL (ref 3.5–5.2)
ALP SERPL-CCNC: 85 U/L (ref 55–135)
ALT SERPL W/O P-5'-P-CCNC: 11 U/L (ref 10–44)
ANION GAP SERPL CALC-SCNC: 6 MMOL/L (ref 8–16)
AST SERPL-CCNC: 13 U/L (ref 10–40)
BASOPHILS # BLD AUTO: 0.05 K/UL (ref 0–0.2)
BASOPHILS NFR BLD: 0.5 % (ref 0–1.9)
BILIRUB SERPL-MCNC: 0.2 MG/DL (ref 0.1–1)
BUN SERPL-MCNC: 19 MG/DL (ref 8–23)
CALCIUM SERPL-MCNC: 9.4 MG/DL (ref 8.7–10.5)
CHLORIDE SERPL-SCNC: 104 MMOL/L (ref 95–110)
CO2 SERPL-SCNC: 25 MMOL/L (ref 23–29)
CREAT SERPL-MCNC: 1 MG/DL (ref 0.5–1.4)
CRP SERPL-MCNC: 1.8 MG/L (ref 0–8.2)
DIFFERENTIAL METHOD: ABNORMAL
EOSINOPHIL # BLD AUTO: 0.2 K/UL (ref 0–0.5)
EOSINOPHIL NFR BLD: 1.6 % (ref 0–8)
ERYTHROCYTE [DISTWIDTH] IN BLOOD BY AUTOMATED COUNT: 12.4 % (ref 11.5–14.5)
ERYTHROCYTE [SEDIMENTATION RATE] IN BLOOD BY PHOTOMETRIC METHOD: 9 MM/HR (ref 0–36)
EST. GFR  (NO RACE VARIABLE): >60 ML/MIN/1.73 M^2
GLUCOSE SERPL-MCNC: 105 MG/DL (ref 70–110)
HCT VFR BLD AUTO: 39.8 % (ref 37–48.5)
HGB BLD-MCNC: 13.8 G/DL (ref 12–16)
IMM GRANULOCYTES # BLD AUTO: 0.03 K/UL (ref 0–0.04)
IMM GRANULOCYTES NFR BLD AUTO: 0.3 % (ref 0–0.5)
LYMPHOCYTES # BLD AUTO: 3.4 K/UL (ref 1–4.8)
LYMPHOCYTES NFR BLD: 36.4 % (ref 18–48)
MCH RBC QN AUTO: 30.8 PG (ref 27–31)
MCHC RBC AUTO-ENTMCNC: 34.7 G/DL (ref 32–36)
MCV RBC AUTO: 89 FL (ref 82–98)
MONOCYTES # BLD AUTO: 0.7 K/UL (ref 0.3–1)
MONOCYTES NFR BLD: 7.4 % (ref 4–15)
NEUTROPHILS # BLD AUTO: 4.9 K/UL (ref 1.8–7.7)
NEUTROPHILS NFR BLD: 53.8 % (ref 38–73)
NRBC BLD-RTO: 0 /100 WBC
PLATELET # BLD AUTO: 235 K/UL (ref 150–450)
PMV BLD AUTO: 8.9 FL (ref 9.2–12.9)
POTASSIUM SERPL-SCNC: 4.3 MMOL/L (ref 3.5–5.1)
PROT SERPL-MCNC: 6.7 G/DL (ref 6–8.4)
RBC # BLD AUTO: 4.48 M/UL (ref 4–5.4)
SODIUM SERPL-SCNC: 135 MMOL/L (ref 136–145)
WBC # BLD AUTO: 9.2 K/UL (ref 3.9–12.7)

## 2023-05-09 PROCEDURE — 63600175 PHARM REV CODE 636 W HCPCS

## 2023-05-09 PROCEDURE — 63600175 PHARM REV CODE 636 W HCPCS: Performed by: EMERGENCY MEDICINE

## 2023-05-09 PROCEDURE — G0378 HOSPITAL OBSERVATION PER HR: HCPCS

## 2023-05-09 PROCEDURE — 86140 C-REACTIVE PROTEIN: CPT

## 2023-05-09 PROCEDURE — 85025 COMPLETE CBC W/AUTO DIFF WBC: CPT

## 2023-05-09 PROCEDURE — 96376 TX/PRO/DX INJ SAME DRUG ADON: CPT

## 2023-05-09 PROCEDURE — 25000003 PHARM REV CODE 250: Performed by: EMERGENCY MEDICINE

## 2023-05-09 PROCEDURE — 99223 1ST HOSP IP/OBS HIGH 75: CPT | Mod: ,,,

## 2023-05-09 PROCEDURE — 25000003 PHARM REV CODE 250

## 2023-05-09 PROCEDURE — 99223 PR INITIAL HOSPITAL CARE,LEVL III: ICD-10-PCS | Mod: ,,,

## 2023-05-09 PROCEDURE — 96375 TX/PRO/DX INJ NEW DRUG ADDON: CPT

## 2023-05-09 PROCEDURE — 99285 PR EMERGENCY DEPT VISIT,LEVEL V: ICD-10-PCS | Mod: ,,, | Performed by: EMERGENCY MEDICINE

## 2023-05-09 PROCEDURE — 99285 EMERGENCY DEPT VISIT HI MDM: CPT | Mod: ,,, | Performed by: EMERGENCY MEDICINE

## 2023-05-09 PROCEDURE — 85652 RBC SED RATE AUTOMATED: CPT

## 2023-05-09 PROCEDURE — 80053 COMPREHEN METABOLIC PANEL: CPT

## 2023-05-09 PROCEDURE — 96374 THER/PROPH/DIAG INJ IV PUSH: CPT

## 2023-05-09 PROCEDURE — 99285 EMERGENCY DEPT VISIT HI MDM: CPT | Mod: 25

## 2023-05-09 RX ORDER — DEXTROSE 40 %
15 GEL (GRAM) ORAL
Status: DISCONTINUED | OUTPATIENT
Start: 2023-05-09 | End: 2023-05-09 | Stop reason: HOSPADM

## 2023-05-09 RX ORDER — EVOLOCUMAB 140 MG/ML
140 INJECTION, SOLUTION SUBCUTANEOUS
COMMUNITY

## 2023-05-09 RX ORDER — ASPIRIN 81 MG/1
81 TABLET ORAL DAILY
Status: DISCONTINUED | OUTPATIENT
Start: 2023-05-09 | End: 2023-05-09 | Stop reason: HOSPADM

## 2023-05-09 RX ORDER — DIAZEPAM 10 MG/1
TABLET ORAL
COMMUNITY

## 2023-05-09 RX ORDER — BISACODYL 10 MG
10 SUPPOSITORY, RECTAL RECTAL DAILY PRN
Status: DISCONTINUED | OUTPATIENT
Start: 2023-05-09 | End: 2023-05-09 | Stop reason: HOSPADM

## 2023-05-09 RX ORDER — ONDANSETRON 2 MG/ML
4 INJECTION INTRAMUSCULAR; INTRAVENOUS
Status: COMPLETED | OUTPATIENT
Start: 2023-05-09 | End: 2023-05-09

## 2023-05-09 RX ORDER — HYDROCODONE BITARTRATE AND ACETAMINOPHEN 10; 325 MG/1; MG/1
1 TABLET ORAL EVERY 6 HOURS PRN
Status: DISCONTINUED | OUTPATIENT
Start: 2023-05-09 | End: 2023-05-09 | Stop reason: HOSPADM

## 2023-05-09 RX ORDER — ONDANSETRON 8 MG/1
8 TABLET, ORALLY DISINTEGRATING ORAL EVERY 8 HOURS PRN
Status: DISCONTINUED | OUTPATIENT
Start: 2023-05-09 | End: 2023-05-09 | Stop reason: HOSPADM

## 2023-05-09 RX ORDER — ACETAMINOPHEN 325 MG/1
650 TABLET ORAL EVERY 4 HOURS PRN
Status: DISCONTINUED | OUTPATIENT
Start: 2023-05-09 | End: 2023-05-09

## 2023-05-09 RX ORDER — METHOCARBAMOL 500 MG/1
1000 TABLET, FILM COATED ORAL
Status: COMPLETED | OUTPATIENT
Start: 2023-05-09 | End: 2023-05-09

## 2023-05-09 RX ORDER — IBUPROFEN 200 MG
1 TABLET ORAL DAILY PRN
Status: DISCONTINUED | OUTPATIENT
Start: 2023-05-09 | End: 2023-05-09 | Stop reason: HOSPADM

## 2023-05-09 RX ORDER — NAPROXEN 500 MG/1
500 TABLET ORAL
Status: DISCONTINUED | OUTPATIENT
Start: 2023-05-09 | End: 2023-05-09

## 2023-05-09 RX ORDER — SODIUM CHLORIDE 0.9 % (FLUSH) 0.9 %
10 SYRINGE (ML) INJECTION
Status: DISCONTINUED | OUTPATIENT
Start: 2023-05-09 | End: 2023-05-09 | Stop reason: HOSPADM

## 2023-05-09 RX ORDER — BENZONATATE 100 MG/1
CAPSULE ORAL
COMMUNITY

## 2023-05-09 RX ORDER — GLUCAGON 1 MG
1 KIT INJECTION
Status: DISCONTINUED | OUTPATIENT
Start: 2023-05-09 | End: 2023-05-09 | Stop reason: HOSPADM

## 2023-05-09 RX ORDER — LIDOCAINE 50 MG/G
1 PATCH TOPICAL
Status: DISCONTINUED | OUTPATIENT
Start: 2023-05-09 | End: 2023-05-09 | Stop reason: HOSPADM

## 2023-05-09 RX ORDER — LOSARTAN POTASSIUM AND HYDROCHLOROTHIAZIDE 12.5; 1 MG/1; MG/1
1 TABLET ORAL DAILY
Status: DISCONTINUED | OUTPATIENT
Start: 2023-05-10 | End: 2023-05-09 | Stop reason: HOSPADM

## 2023-05-09 RX ORDER — POLYETHYLENE GLYCOL 3350 17 G/17G
17 POWDER, FOR SOLUTION ORAL DAILY PRN
Status: DISCONTINUED | OUTPATIENT
Start: 2023-05-09 | End: 2023-05-09 | Stop reason: HOSPADM

## 2023-05-09 RX ORDER — HYDROCODONE BITARTRATE AND ACETAMINOPHEN 5; 325 MG/1; MG/1
1 TABLET ORAL EVERY 6 HOURS PRN
Status: DISCONTINUED | OUTPATIENT
Start: 2023-05-09 | End: 2023-05-09

## 2023-05-09 RX ORDER — EZETIMIBE 10 MG/1
10 TABLET ORAL
COMMUNITY
Start: 2023-03-17 | End: 2023-06-12 | Stop reason: SDUPTHER

## 2023-05-09 RX ORDER — ONDANSETRON 2 MG/ML
INJECTION INTRAMUSCULAR; INTRAVENOUS
Status: COMPLETED
Start: 2023-05-09 | End: 2023-05-09

## 2023-05-09 RX ORDER — HYDROCODONE BITARTRATE AND ACETAMINOPHEN 5; 325 MG/1; MG/1
TABLET ORAL
COMMUNITY

## 2023-05-09 RX ORDER — MORPHINE SULFATE 4 MG/ML
4 INJECTION, SOLUTION INTRAMUSCULAR; INTRAVENOUS
Status: COMPLETED | OUTPATIENT
Start: 2023-05-09 | End: 2023-05-09

## 2023-05-09 RX ORDER — CYCLOBENZAPRINE HCL 10 MG
TABLET ORAL
COMMUNITY

## 2023-05-09 RX ORDER — HYDROCODONE BITARTRATE AND ACETAMINOPHEN 5; 325 MG/1; MG/1
1 TABLET ORAL EVERY 6 HOURS PRN
Status: DISCONTINUED | OUTPATIENT
Start: 2023-05-09 | End: 2023-05-09 | Stop reason: HOSPADM

## 2023-05-09 RX ORDER — TALC
6 POWDER (GRAM) TOPICAL NIGHTLY PRN
Status: DISCONTINUED | OUTPATIENT
Start: 2023-05-09 | End: 2023-05-09 | Stop reason: HOSPADM

## 2023-05-09 RX ORDER — METHIMAZOLE 5 MG/1
TABLET ORAL
COMMUNITY
Start: 2023-02-16

## 2023-05-09 RX ORDER — DEXTROSE 40 %
30 GEL (GRAM) ORAL
Status: DISCONTINUED | OUTPATIENT
Start: 2023-05-09 | End: 2023-05-09 | Stop reason: HOSPADM

## 2023-05-09 RX ORDER — HYDROXYZINE PAMOATE 25 MG/1
CAPSULE ORAL
COMMUNITY

## 2023-05-09 RX ORDER — VARENICLINE TARTRATE 1 MG/1
1 TABLET, FILM COATED ORAL 2 TIMES DAILY
Status: DISCONTINUED | OUTPATIENT
Start: 2023-05-09 | End: 2023-05-09 | Stop reason: HOSPADM

## 2023-05-09 RX ORDER — LEVOCETIRIZINE DIHYDROCHLORIDE 5 MG/1
TABLET, FILM COATED ORAL
COMMUNITY
Start: 2023-03-16

## 2023-05-09 RX ORDER — PROMETHAZINE HYDROCHLORIDE 25 MG/1
25 TABLET ORAL EVERY 6 HOURS PRN
Status: DISCONTINUED | OUTPATIENT
Start: 2023-05-09 | End: 2023-05-09 | Stop reason: HOSPADM

## 2023-05-09 RX ADMIN — ONDANSETRON 4 MG: 2 INJECTION INTRAMUSCULAR; INTRAVENOUS at 05:05

## 2023-05-09 RX ADMIN — HYDROCODONE BITARTRATE AND ACETAMINOPHEN 1 TABLET: 10; 325 TABLET ORAL at 09:05

## 2023-05-09 RX ADMIN — HYDROCODONE BITARTRATE AND ACETAMINOPHEN 1 TABLET: 5; 325 TABLET ORAL at 11:05

## 2023-05-09 RX ADMIN — LIDOCAINE 1 PATCH: 50 PATCH CUTANEOUS at 03:05

## 2023-05-09 RX ADMIN — MORPHINE SULFATE 4 MG: 4 INJECTION INTRAVENOUS at 05:05

## 2023-05-09 RX ADMIN — ASPIRIN 81 MG: 81 TABLET, COATED ORAL at 10:05

## 2023-05-09 RX ADMIN — METHOCARBAMOL 1000 MG: 500 TABLET ORAL at 03:05

## 2023-05-09 RX ADMIN — VARENICLINE TARTRATE 1 MG: 1 TABLET, FILM COATED ORAL at 10:05

## 2023-05-09 NOTE — H&P
[facility] - Emergency Dept  [facility]  History & Physical    Patient Name: Lina Farmer  MRN: [id number]  Patient Class: OP- Observation  Admission Date: 5/9/2023  Attending Physician: Chip Swift MD   Primary Care Provider: Kimmy Kraus MD         Patient information was obtained from patient and ER records.     Subjective:     Principal Problem:Trochanteric bursitis of left hip    Chief Complaint:   Chief Complaint   Patient presents with    Leg Pain     States L leg pain starting at 5 pm, denies trauma        HPI: Lina Farmer is a 63 y.o. female with a hx of HTN, HLD and DM presents to the ED for acute onset of left hip pain. Patient was in her usual state of health when she was sitting/ watching TV and felt a sharp pain in her left hip. Pain worsened with ambulation and resolves once resting. She tried taking her home pain medication without any relief of symptoms. She denies any falls or trauma to the leg. Has had an episode similar in previous years that was resolved with a steroid injection. Denies any radiation to her back or down her leg. Denies numbness, tingling and weakness in extremity. Denies headache, chest pain, SOB, abdominal pain and lower extremity edema.     ED: AF, VSS. CBC/ CMP largely unremarkable. ESR 9/ CRP 1.8. Xray of hip showed no acute fracture or dislocation left hip. CT Hip showed no acute fracture or dislocation. Degenerative changes and mild bony demineralization. Given aspirin, lidocaine patch, robain, and morphine x 2 in ED.       Past Medical History:   Diagnosis Date    Atherosclerotic peripheral vascular disease     Carotid stenosis     Coronary atherosclerosis     RCA-    Diabetes mellitus, type 2     Diverticulitis large intestine 05/2014    Hypercholesterolemia     Hypertension     Trochanteric bursitis of left hip 5/9/2023       Past Surgical History:   Procedure Laterality Date    ILIAC ARTERY STENT Left 02/24/2010    Maria T 8 x 29 mm     ILIAC ARTERY STENT Right 02/17/2010    Maria T 8 x 60 mm    TUBAL LIGATION      TYMPANOTOMY  11/2018       Review of patient's allergies indicates:  No Known Allergies    No current facility-administered medications on file prior to encounter.     Current Outpatient Medications on File Prior to Encounter   Medication Sig    levocetirizine (XYZAL) 5 MG tablet levocetirizine 5 mg tablet   TAKE 1 TABLET BY MOUTH EVERY EVENING    methIMAzole (TAPAZOLE) 5 MG Tab methimazole 5 mg tablet   TAKE 1 TABLET BY MOUTH TWICE DAILY    aspirin (ECOTRIN) 81 MG EC tablet Take 81 mg by mouth once daily.    benzonatate (TESSALON) 100 MG capsule benzonatate 100 mg capsule    cyclobenzaprine (FLEXERIL) 10 MG tablet cyclobenzaprine 10 mg tablet   TAKE 1 TABLET BY MOUTH TWICE DAILY AS NEEDED FOR MUSCLE SPASM    diazePAM (VALIUM) 10 MG Tab diazepam 10 mg tablet   TAKE 1 TO 2 TABLET BY MOUTH 1 HOUR PRIOR TO APPOINTMENT    evolocumab (REPATHA SURECLICK) 140 mg/mL PnIj Inject 1 mL (140 mg total) into the skin every 14 (fourteen) days.    evolocumab (REPATHA SYRINGE) 140 mg/mL Syrg Inject 140 mg into the skin.    ezetimibe (ZETIA) 10 mg tablet Take 10 mg by mouth.    HYDROcodone-acetaminophen (NORCO) 5-325 mg per tablet hydrocodone 5 mg-acetaminophen 325 mg tablet   TAKE 1 TABLET BY MOUTH EVERY 8 HOURS AS NEEDED AS NEEDED FOR PAIN    hydrOXYzine pamoate (VISTARIL) 25 MG Cap hydroxyzine pamoate 25 mg capsule    losartan-hydrochlorothiazide 100-12.5 mg (HYZAAR) 100-12.5 mg Tab Take 1 tablet by mouth once daily.    oxyCODONE-acetaminophen (PERCOCET)  mg per tablet daily as needed.    varenicline (CHANTIX) 1 mg Tab Take 1 tablet (1 mg total) by mouth 2 (two) times daily.    zolpidem (AMBIEN) 10 mg Tab Take 10 mg by mouth once daily.     Family History       Problem Relation (Age of Onset)    Coronary artery disease Mother, Father    Diabetes Father    Hypertension Sister, Brother          Tobacco Use    Smoking status:  Every Day     Types: Cigarettes     Last attempt to quit: 2021     Years since quittin.5    Smokeless tobacco: Never   Substance and Sexual Activity    Alcohol use: Yes    Drug use: Not on file    Sexual activity: Not on file     Review of Systems   Constitutional:  Negative for activity change, chills and fever.   HENT:  Negative for trouble swallowing.    Eyes:  Negative for photophobia and visual disturbance.   Respiratory:  Negative for cough, chest tightness and shortness of breath.    Cardiovascular:  Negative for chest pain, palpitations and leg swelling.   Gastrointestinal:  Negative for abdominal pain, constipation, diarrhea, nausea and vomiting.   Genitourinary:  Negative for dysuria, frequency and hematuria.   Musculoskeletal:  Positive for arthralgias and myalgias. Negative for back pain, gait problem and neck pain.   Skin:  Negative for rash and wound.   Neurological:  Negative for dizziness, syncope, speech difficulty and light-headedness.   Psychiatric/Behavioral:  Negative for agitation and confusion. The patient is not nervous/anxious.    Objective:     Vital Signs (Most Recent):  Temp: 97.7 °F (36.5 °C) (23 1131)  Pulse: 75 (23 1131)  Resp: 16 (23 1131)  BP: 132/60 (23 1131)  SpO2: 97 % (23 1131) Vital Signs (24h Range):  Temp:  [97.7 °F (36.5 °C)-98.1 °F (36.7 °C)] 97.7 °F (36.5 °C)  Pulse:  [75-90] 75  Resp:  [16-18] 16  SpO2:  [97 %-98 %] 97 %  BP: (132-147)/(60-72) 132/60     Weight: 74.8 kg (165 lb)  Body mass index is 29.23 kg/m².     Physical Exam  Vitals and nursing note reviewed.   Constitutional:       General: She is not in acute distress.     Appearance: She is well-developed.   HENT:      Head: Normocephalic and atraumatic.      Mouth/Throat:      Pharynx: No oropharyngeal exudate.   Cardiovascular:      Rate and Rhythm: Normal rate and regular rhythm.      Heart sounds: Normal heart sounds.   Pulmonary:      Effort: Pulmonary effort is  normal. No respiratory distress.      Breath sounds: Normal breath sounds. No wheezing.   Abdominal:      General: Bowel sounds are normal. There is no distension.      Palpations: Abdomen is soft.      Tenderness: There is no abdominal tenderness.   Musculoskeletal:         General: Tenderness (left hip) present. No swelling, deformity or signs of injury. Normal range of motion.   Skin:     General: Skin is warm and dry.      Capillary Refill: Capillary refill takes less than 2 seconds.      Findings: No rash.   Neurological:      Mental Status: She is alert and oriented to person, place, and time.      Cranial Nerves: No cranial nerve deficit.      Sensory: No sensory deficit.      Coordination: Coordination normal.              Significant Labs: All pertinent labs within the past 24 hours have been reviewed.    Significant Imaging: I have reviewed all pertinent imaging results/findings within the past 24 hours.    Assessment/Plan:     * Trochanteric bursitis of left hip  - afebrile, VSS  - CRP/ESR wnl  - Xray showed no acute fracture  - CT Hip without acute process  - multimodal pain control  - Ortho consulted  No acute Orthopedic intervention  Weightbearing as tolerated to the left lower extremity   Conservative measures for trochanteric bursitis - NSAIDs, stretching, and PT    Essential hypertension  Controlled  - continue home medications    Diabetes mellitus, type 2  Patient's FSGs are controlled on current medication regimen.  Last A1c reviewed-   Lab Results   Component Value Date    HGBA1C 5.9 (H) 11/04/2021     Most recent fingerstick glucose reviewed- No results for input(s): POCTGLUCOSE in the last 24 hours.  Maintain anti-hyperglycemic dose as follows-   Antihyperglycemics (From admission, onward)    None        Hold Oral hypoglycemics while patient is in the hospital.  - diet managed    Coronary atherosclerosis  Controlled  - continue home meds      VTE Risk Mitigation (From admission, onward)          Ordered     IP VTE LOW RISK PATIENT  Once         05/09/23 0802     Place sequential compression device  Until discontinued         05/09/23 0802                     On 05/09/2023, patient should be placed in hospital observation services under my care in collaboration with Dr. Chip Swift.      Zo Reilly PA-C  Department of Hospital Medicine  [facility] - Emergency Dept

## 2023-05-09 NOTE — HPI
Lina Farmer is a 63 y.o. female with a hx of HTN, HLD and DM presents to the ED for acute onset of left hip pain. Patient was in her usual state of health when she was sitting/ watching TV and felt a sharp pain in her left hip. Pain worsened with ambulation and resolves once resting. She tried taking her home pain medication without any relief of symptoms. She denies any falls or trauma to the leg. Has had an episode similar in previous years that was resolved with a steroid injection. Denies any radiation to her back or down her leg. Denies numbness, tingling and weakness in extremity. Denies headache, chest pain, SOB, abdominal pain and lower extremity edema.     ED: AF, VSS. CBC/ CMP largely unremarkable. ESR 9/ CRP 1.8. Xray of hip showed no acute fracture or dislocation left hip. CT Hip showed no acute fracture or dislocation. Degenerative changes and mild bony demineralization. Given aspirin, lidocaine patch, robain, and morphine x 2 in ED.

## 2023-05-09 NOTE — DISCHARGE SUMMARY
[facility] - Emergency Dept  Hospital Medicine  Discharge Summary      Patient Name: Lina Farmer  MRN: [id number]  ANNALISA: [id number]  Patient Class: OP- Observation  Admission Date: 5/9/2023  Hospital Length of Stay: 0 days  Discharge Date and Time: 5/9/2023 11:49 AM  Attending Physician: Alissa att. providers found   Discharging Provider: Zo Reilly PA-C  Primary Care Provider: Kimmy Kraus MD  [facility] Zo Reilly PA-C  Primary Care Team: [facility]    HPI:   Lina Farmer is a 63 y.o. female with a hx of HTN, HLD and DM presents to the ED for acute onset of left hip pain. Patient was in her usual state of health when she was sitting/ watching TV and felt a sharp pain in her left hip. Pain worsened with ambulation and resolves once resting. She tried taking her home pain medication without any relief of symptoms. She denies any falls or trauma to the leg. Has had an episode similar in previous years that was resolved with a steroid injection. Denies any radiation to her back or down her leg. Denies numbness, tingling and weakness in extremity. Denies headache, chest pain, SOB, abdominal pain and lower extremity edema.     ED: AF, VSS. CBC/ CMP largely unremarkable. ESR 9/ CRP 1.8. Xray of hip showed no acute fracture or dislocation left hip. CT Hip showed no acute fracture or dislocation. Degenerative changes and mild bony demineralization. Given aspirin, lidocaine patch, robain, and morphine x 2 in ED.       * No surgery found *      Hospital Course:   Lina Farmer is a 63 y.o. female admitted to observation for left hip pain. Imaging reviewed w/o acute process. Ortho consulted. Suspect trochanteric bursitis. Recommend outpatient PT and conservative management. Referral for sports medicine placed for Dr. Noriega. Plan of care discussed with patient and family. Patient is medically ready for discharge. All questions answered at bedside with verbal understanding.  Return precautions given.        Goals of Care Treatment Preferences:  Code Status: Full Code      Consults:   Consults (From admission, onward)        Status Ordering Provider     Inpatient consult to Orthopedics  Once        Provider:  (Not yet assigned)    SONNY Resendiz new Assessment & Plan notes have been filed under this hospital service since the last note was generated.  Service: Hospital Medicine    Final Active Diagnoses:    Diagnosis Date Noted POA    PRINCIPAL PROBLEM:  Trochanteric bursitis of left hip [M70.62] 05/09/2023 Yes    Diabetes mellitus, type 2 [E11.9]  Yes    Essential hypertension [I10]  Yes    Coronary atherosclerosis [I25.10]  Yes      Problems Resolved During this Admission:       Discharged Condition: stable    Disposition: Home or Self Care    Follow Up:   Follow-up Information     Schedule an appointment as soon as possible for a visit  with Kimmy Kraus MD.    Specialty: Family Medicine  Contact information:  [address] [place] [id number]  [phone]                       Patient Instructions:      Ambulatory referral/consult to Physical/Occupational Therapy   Standing Status: Future   Referral Priority: Routine Referral Type: Physical Medicine   Referral Reason: Specialty Services Required   Number of Visits Requested: 1     Ambulatory referral/consult to Sports Medicine   Standing Status: Future   Referral Priority: Routine Referral Type: Consultation   Referral Reason: Specialty Services Required   Referred to Provider: CHINA ALMONTE Requested Specialty: Sports Medicine   Number of Visits Requested: 1     Activity as tolerated         Pending Diagnostic Studies:     None         Medications:  Reconciled Home Medications:      Medication List      CONTINUE taking these medications    aspirin 81 MG EC tablet  Commonly known as: ECOTRIN  Take 81 mg by mouth once daily.     benzonatate 100 MG capsule  Commonly known as:  TESSALON  benzonatate 100 mg capsule     cyclobenzaprine 10 MG tablet  Commonly known as: FLEXERIL  cyclobenzaprine 10 mg tablet   TAKE 1 TABLET BY MOUTH TWICE DAILY AS NEEDED FOR MUSCLE SPASM     diazePAM 10 MG Tab  Commonly known as: VALIUM  diazepam 10 mg tablet   TAKE 1 TO 2 TABLET BY MOUTH 1 HOUR PRIOR TO APPOINTMENT     ezetimibe 10 mg tablet  Commonly known as: ZETIA  Take 10 mg by mouth.     HYDROcodone-acetaminophen 5-325 mg per tablet  Commonly known as: NORCO  hydrocodone 5 mg-acetaminophen 325 mg tablet   TAKE 1 TABLET BY MOUTH EVERY 8 HOURS AS NEEDED AS NEEDED FOR PAIN     hydrOXYzine pamoate 25 MG Cap  Commonly known as: VISTARIL  hydroxyzine pamoate 25 mg capsule     levocetirizine 5 MG tablet  Commonly known as: XYZAL  levocetirizine 5 mg tablet   TAKE 1 TABLET BY MOUTH EVERY EVENING     losartan-hydrochlorothiazide 100-12.5 mg 100-12.5 mg Tab  Commonly known as: HYZAAR  Take 1 tablet by mouth once daily.     methIMAzole 5 MG Tab  Commonly known as: TAPAZOLE  methimazole 5 mg tablet   TAKE 1 TABLET BY MOUTH TWICE DAILY     oxyCODONE-acetaminophen  mg per tablet  Commonly known as: PERCOCET  daily as needed.     * REPATHA SURECLICK 140 mg/mL Pnij  Generic drug: evolocumab  Inject 1 mL (140 mg total) into the skin every 14 (fourteen) days.     * REPATHA SYRINGE 140 mg/mL Syrg  Generic drug: evolocumab  Inject 140 mg into the skin.     varenicline 1 mg Tab  Commonly known as: CHANTIX  Take 1 tablet (1 mg total) by mouth 2 (two) times daily.     zolpidem 10 mg Tab  Commonly known as: AMBIEN  Take 10 mg by mouth once daily.         * This list has 2 medication(s) that are the same as other medications prescribed for you. Read the directions carefully, and ask your doctor or other care provider to review them with you.                Indwelling Lines/Drains at time of discharge:   Lines/Drains/Airways     None                 Time spent on the discharge of patient: 36 minutes         Zo ABDI  KELLY Reilly  Department of Hospital Medicine  [facility] - Emergency Dept

## 2023-05-09 NOTE — HOSPITAL COURSE
Lina Farmer is a 63 y.o. female admitted to observation for left hip pain. Imaging reviewed w/o acute process. Ortho consulted. Suspect trochanteric bursitis. Recommend outpatient PT and conservative management. Referral for sports medicine placed for Dr. Noriega. Plan of care discussed with patient and family. Patient is medically ready for discharge. All questions answered at bedside with verbal understanding. Return precautions given.

## 2023-05-09 NOTE — ED TRIAGE NOTES
Chief Complaint   Patient presents with    Leg Pain     States L leg pain starting at 5 pm, denies trauma     Pt. Unable to bare weight d/t pain.  Pt. Took a percocet 10 around 1800    APPEARANCE: No acute distress.    NEURO: Awake, alert, appropriate for age  HEENT: Head symmetrical. No obvious deformity  RESPIRATORY: Airway is open and patent. Respirations are spontaneous on room air.   NEUROVASCULAR: All extremities are warm and pink with capillary refill less than 3 seconds.   MUSCULOSKELETAL: as noted above.  Moves all extremities, wiggling toes and moving hands.   SKIN: Warm and dry, adequate turgor, mucus membranes moist and pink  SOCIAL: Patient is accompanied by family.   Will continue to monitor.

## 2023-05-09 NOTE — HPI
Lina Farmer is a 63 y.o. female with past medical history of hypertension, hyperlipidemia, peripheral vascular disease, and prediabetes who presented to the emergency department yesterday evening for left hip pain.  The patient reports developing acute onset left lateral hip pain after playing with her grandchildren yesterday evening.  She denies any falls or trauma.  She states the pain is located on the lateral aspect of her hip with some mild radiation to the lateral knee.  She denies numbness, tingling, and weakness in the left lower extremity.  She endorses chronic and mild low back pain.  She denies buttock pain and pain radiating from the back/buttock to the hip and thigh.  She reports taking some of her Percocet at home yesterday without any relief in the hip pain, so she presented to the ER for further evaluation.  Imaging and labs in the emergency department were unremarkable and the patient was admitted to observation for further care.      Orthopedics was consulted for evaluation of left hip pain of unclear etiology.  The patient reports a similar episode of pain 2-3 years ago.  She was diagnosed with bursitis and the pain improved with conservative measures.

## 2023-05-09 NOTE — ASSESSMENT & PLAN NOTE
Patient's FSGs are controlled on current medication regimen.  Last A1c reviewed-   Lab Results   Component Value Date    HGBA1C 5.9 (H) 11/04/2021     Most recent fingerstick glucose reviewed- No results for input(s): POCTGLUCOSE in the last 24 hours.  Maintain anti-hyperglycemic dose as follows-   Antihyperglycemics (From admission, onward)    None        Hold Oral hypoglycemics while patient is in the hospital.  - diet managed

## 2023-05-09 NOTE — PROVIDER PROGRESS NOTES - EMERGENCY DEPT.
Encounter Date: 5/9/2023    ED Physician Progress Notes        Signout Note    I received signout from the previous provider.     Chief complaint:  Leg Pain (States L leg pain starting at 5 pm, denies trauma)      Pertinent history &exam:  Lina Farmer is a 63 y.o. female with pertinent PMH  of HTN, type 2 DM, HLD, arthritis, and PVD who presents to the ED for chief complaint of left leg pain, specifically in the area of the left thigh and hip    Vitals:    05/09/23 0556   BP:    Pulse:    Resp: 18   Temp:        Imaging Studies:    CT Hip Without Contrast Left   Final Result      No acute fracture or dislocation.      Degenerative changes and mild bony demineralization.      Electronically signed by resident: Uvaldo Montes   Date:    05/09/2023   Time:    06:08      Electronically signed by: Vamshi Gonzalez MD   Date:    05/09/2023   Time:    07:20      X-Ray Hip 2 or 3 views Left (with Pelvis when performed)   Final Result      No acute fracture or dislocation left hip.         Electronically signed by: José Luis Graham MD   Date:    05/09/2023   Time:    03:18          Medications Given:  Medications   LIDOcaine 5 % patch 1 patch (1 patch Transdermal Patch Applied 5/9/23 0338)   methocarbamoL tablet 1,000 mg (1,000 mg Oral Given 5/9/23 0308)   morphine injection 4 mg (4 mg Intravenous Given 5/9/23 0507)   ondansetron injection 4 mg (4 mg Intravenous Given 5/9/23 0506)   morphine injection 4 mg (4 mg Intravenous Given 5/9/23 0556)       Pending Items/ MDM:  63 y.o. female who was signed out to me pending, a CT scan of her left hip.  Patient's CT scan did not show any fracture or dislocation.  Patient was unable to ambulate and so I did not feel comfortable discharging patient home.  Discussion was had Hospital Medicine concerning admitting patient for PT OT.  She was admitted.     Diagnostic Impression:    1. Left hip pain    2. Left thigh pain         ED Disposition Condition    Observation                 Patient and/or family understands the plan and is in agreement, verbalized understanding, questions answered    ED Course as of 05/09/23 0759   Tue May 09, 2023   0332 X-ray shows no fracture or dislocation of the hip. [MD]      ED Course User Index  [MD] MD Juan M Douglass MD  Emergency Medicine

## 2023-05-09 NOTE — CONSULTS
[facility] - Emergency Dept  Orthopedics  Consult Note    Patient Name: Lina Farmer  MRN: [id number]  Admission Date: 5/9/2023  Hospital Length of Stay: 0 days  Attending Provider: Chip Swift MD  Primary Care Provider: Kimmy Kraus MD    Patient information was obtained from patient, relative(s), past medical records, ER records and primary team.     Inpatient consult to Orthopedics  Consult performed by: Ruben Abbott MD  Consult ordered by: Zo Reilly PA-C        Subjective:     Principal Problem:Trochanteric bursitis of left hip    Chief Complaint:   Chief Complaint   Patient presents with    Leg Pain     States L leg pain starting at 5 pm, denies trauma        HPI: Lina Farmer is a 63 y.o. female with past medical history of hypertension, hyperlipidemia, peripheral vascular disease, and prediabetes who presented to the emergency department yesterday evening for left hip pain.  The patient reports developing acute onset left lateral hip pain after playing with her grandchildren yesterday evening.  She denies any falls or trauma.  She states the pain is located on the lateral aspect of her hip with some mild radiation to the lateral knee.  She denies numbness, tingling, and weakness in the left lower extremity.  She endorses chronic and mild low back pain.  She denies buttock pain and pain radiating from the back/buttock to the hip and thigh.  She reports taking some of her Percocet at home yesterday without any relief in the hip pain, so she presented to the ER for further evaluation.  Imaging and labs in the emergency department were unremarkable and the patient was admitted to observation for further care.      Orthopedics was consulted for evaluation of left hip pain of unclear etiology.  The patient reports a similar episode of pain 2-3 years ago.  She was diagnosed with bursitis and the pain improved with conservative measures.      Past Medical History:   Diagnosis Date     Atherosclerotic peripheral vascular disease     Carotid stenosis     Coronary atherosclerosis     RCA-    Diabetes mellitus, type 2     Diverticulitis large intestine 05/2014    Hypercholesterolemia     Hypertension        Past Surgical History:   Procedure Laterality Date    ILIAC ARTERY STENT Left 02/24/2010    Maria T 8 x 29 mm    ILIAC ARTERY STENT Right 02/17/2010    Maria T 8 x 60 mm    TUBAL LIGATION      TYMPANOTOMY  11/2018       Review of patient's allergies indicates:  No Known Allergies    Current Facility-Administered Medications   Medication    aspirin EC tablet 81 mg    bisacodyL suppository 10 mg    dextrose 10% bolus 125 mL 125 mL    dextrose 10% bolus 250 mL 250 mL    dextrose 40 % gel 15,000 mg    dextrose 40 % gel 30,000 mg    glucagon (human recombinant) injection 1 mg    HYDROcodone-acetaminophen  mg per tablet 1 tablet    HYDROcodone-acetaminophen 5-325 mg per tablet 1 tablet    LIDOcaine 5 % patch 1 patch    [START ON 5/10/2023] losartan-hydrochlorothiazide 100-12.5 mg per tablet 1 tablet    melatonin tablet 6 mg    nicotine 14 mg/24 hr 1 patch    ondansetron disintegrating tablet 8 mg    polyethylene glycol packet 17 g    promethazine tablet 25 mg    sodium chloride 0.9% flush 10 mL    varenicline tablet 1 mg     Current Outpatient Medications   Medication Sig    aspirin (ECOTRIN) 81 MG EC tablet Take 81 mg by mouth once daily.    evolocumab (REPATHA SURECLICK) 140 mg/mL PnIj Inject 1 mL (140 mg total) into the skin every 14 (fourteen) days.    losartan-hydrochlorothiazide 100-12.5 mg (HYZAAR) 100-12.5 mg Tab Take 1 tablet by mouth once daily.    oxyCODONE-acetaminophen (PERCOCET)  mg per tablet daily as needed.    varenicline (CHANTIX) 1 mg Tab Take 1 tablet (1 mg total) by mouth 2 (two) times daily.    zolpidem (AMBIEN) 10 mg Tab Take 10 mg by mouth once daily.     Family History       Problem Relation (Age of Onset)    Coronary artery  "disease Mother, Father    Diabetes Father    Hypertension Sister, Brother          Tobacco Use    Smoking status: Every Day     Types: Cigarettes     Last attempt to quit: 2021     Years since quittin.5    Smokeless tobacco: Never   Substance and Sexual Activity    Alcohol use: Yes    Drug use: Not on file    Sexual activity: Not on file     ROS  Constitutional: Denies fever/chills   Neurological: Denies numbness/tingling (any exceptions noted in orthopaedic exam)    Psychiatric/Behavioral: Denies change in normal mood   Eyes: Denies change in vision   Cardiovascular: Denies chest pain   Respiratory: Denies shortness of breath   Hematologic/Lymphatic: Denies easy bleeding/bruising    Skin: Denies new rash or skin lesions    Gastrointestinal: Denies nausea/vomitting/diarrhea, change in bowel habits, abdominal pain    Allergic/Immunologic: Denies adverse reactions to current medications   Musculoskeletal: see HPI     Objective:     Vital Signs (Most Recent):  Temp: 98.1 °F (36.7 °C) (23)  Pulse: 79 (23)  Resp: 17 (23)  BP: (!) 147/63 (23)  SpO2: 97 % (23) Vital Signs (24h Range):  Temp:  [98 °F (36.7 °C)-98.1 °F (36.7 °C)] 98.1 °F (36.7 °C)  Pulse:  [79-90] 79  Resp:  [16-18] 17  SpO2:  [97 %-98 %] 97 %  BP: (132-147)/(62-72) 147/63     Weight: 74.8 kg (165 lb)  Height: 5' 3" (160 cm)  Body mass index is 29.23 kg/m².    Ortho/SPM Exam  General: no acute distress, appears stated age     Neuro: alert and oriented x3   Psych: normal mood   Head: normocephalic, atraumatic.    Eyes: no scleral icterus   Mouth: moist mucous membranes   Cardiovascular: extremities warm and well perfused   Lungs: breathing comfortably, equal chest rise bilat   Skin: clean, dry, intact (any exceptions noted in below musculoskeletal exam)    Musculoskeletal:  LUE:  - Skin intact throughout, no open wounds  - No swelling  - No ecchymosis, erythema, or signs of cellulitis  - " NonTTP throughout  - AROM and PROM of the shoulder, elbow, wrist, and hand intact without pain  - Axillary/AIN/PIN/Radial/Median/Ulnar nerves assessed in isolation and are without deficit  - SILT throughout  - Compartments soft  - 2+ radial artery pulse  - Capillary Refill < 2 sec    RUE:  - Skin intact throughout, no open wounds  - No swelling  - No ecchymosis, erythema, or signs of cellulitis  - NonTTP throughout  - AROM and PROM of the shoulder, elbow, wrist, and hand intact without pain  - Axillary/AIN/PIN/Radial/Median/Ulnar nerves assessed in isolation and are without deficit  - SILT throughout  - Compartments soft  - 2+ radial artery pulse  - Capillary Refill < 2 sec    LLE:  - Skin intact throughout, no open wounds  - No swelling  - No ecchymosis, erythema, or signs of cellulitis  - Mild/moderate TTP over the greater trochanter, otherwise non TTP throughout  - AROM and PROM of the hip, knee, ankle, and foot intact without pain  - No pain with axial loading of the hip  - Negative straight leg raise  - TA/EHL/Gastroc/FHL assessed in isolation and are without deficit  - SILT throughout  - Compartments soft  - 2+ DP and PT pulses  - Capillary Refill < 2 sec  - Negative Log roll    RLE:  - Skin intact throughout, no open wounds  - No swelling  - No ecchymosis, erythema, or signs of cellulitis  - NonTTP throughout  - AROM and PROM of the hip, knee, ankle, and foot intact without pain  - TA/EHL/Gastroc/FHL assessed in isolation and are without deficit  - SILT throughout  - Compartments soft  - 2+ DP and PT pulses  - Capillary Refill < 2 sec  - Negative Log roll    Spine/pelvis/axial body:  No tenderness to palpation of cervical, thoracic, or lumbar spine  No pain with compression of pelvis    Significant Labs: All pertinent labs within the past 24 hours have been reviewed.    Significant Imaging: I have reviewed and interpreted all pertinent imaging results/findings.  X-ray and CT of the left hip with no acute  abnormalities.  Alignment within normal limits.  Minimal degenerative changes noted.    Assessment/Plan:     * Trochanteric bursitis of left hip  Lina Farmer is a 63 y.o. female with trochanteric bursitis of the left hip.  X-ray and CT of the left hip show no acute abnormalities.  Laboratory studies for infection within normal limits with a WBC count of 9.20, ESR of 9, and CRP of 1.8.  On exam she has isolated tenderness to palpation over the greater trochanter.  She does not have any pain with hip range of motion and axial loading of the hip.  Normal neurovascular exam.  Low concern for radiculopathy given description of pain and negative straight leg raise.  She had a similar episode of trochanteric bursitis 2-3 years ago which resolved with conservative measures.    Plan:  No acute Orthopedic intervention  Weightbearing as tolerated to the left lower extremity   Conservative measures for trochanteric bursitis - NSAIDs, stretching, and PT    Dispo:  Outpatient Orthopedic follow-up          Ruben Abbott MD  Orthopedics  [facility] - [facility] Dept

## 2023-05-09 NOTE — SUBJECTIVE & OBJECTIVE
Past Medical History:   Diagnosis Date    Atherosclerotic peripheral vascular disease     Carotid stenosis     Coronary atherosclerosis     RCA-    Diabetes mellitus, type 2     Diverticulitis large intestine 05/2014    Hypercholesterolemia     Hypertension        Past Surgical History:   Procedure Laterality Date    ILIAC ARTERY STENT Left 02/24/2010    Maria T 8 x 29 mm    ILIAC ARTERY STENT Right 02/17/2010    Maria T 8 x 60 mm    TUBAL LIGATION      TYMPANOTOMY  11/2018       Review of patient's allergies indicates:  No Known Allergies    Current Facility-Administered Medications   Medication    aspirin EC tablet 81 mg    bisacodyL suppository 10 mg    dextrose 10% bolus 125 mL 125 mL    dextrose 10% bolus 250 mL 250 mL    dextrose 40 % gel 15,000 mg    dextrose 40 % gel 30,000 mg    glucagon (human recombinant) injection 1 mg    HYDROcodone-acetaminophen  mg per tablet 1 tablet    HYDROcodone-acetaminophen 5-325 mg per tablet 1 tablet    LIDOcaine 5 % patch 1 patch    [START ON 5/10/2023] losartan-hydrochlorothiazide 100-12.5 mg per tablet 1 tablet    melatonin tablet 6 mg    nicotine 14 mg/24 hr 1 patch    ondansetron disintegrating tablet 8 mg    polyethylene glycol packet 17 g    promethazine tablet 25 mg    sodium chloride 0.9% flush 10 mL    varenicline tablet 1 mg     Current Outpatient Medications   Medication Sig    aspirin (ECOTRIN) 81 MG EC tablet Take 81 mg by mouth once daily.    evolocumab (REPATHA SURECLICK) 140 mg/mL PnIj Inject 1 mL (140 mg total) into the skin every 14 (fourteen) days.    losartan-hydrochlorothiazide 100-12.5 mg (HYZAAR) 100-12.5 mg Tab Take 1 tablet by mouth once daily.    oxyCODONE-acetaminophen (PERCOCET)  mg per tablet daily as needed.    varenicline (CHANTIX) 1 mg Tab Take 1 tablet (1 mg total) by mouth 2 (two) times daily.    zolpidem (AMBIEN) 10 mg Tab Take 10 mg by mouth once daily.     Family History       Problem Relation (Age of Onset)    Coronary  "artery disease Mother, Father    Diabetes Father    Hypertension Sister, Brother          Tobacco Use    Smoking status: Every Day     Types: Cigarettes     Last attempt to quit: 2021     Years since quittin.5    Smokeless tobacco: Never   Substance and Sexual Activity    Alcohol use: Yes    Drug use: Not on file    Sexual activity: Not on file     ROS  Constitutional: Denies fever/chills   Neurological: Denies numbness/tingling (any exceptions noted in orthopaedic exam)    Psychiatric/Behavioral: Denies change in normal mood   Eyes: Denies change in vision   Cardiovascular: Denies chest pain   Respiratory: Denies shortness of breath   Hematologic/Lymphatic: Denies easy bleeding/bruising    Skin: Denies new rash or skin lesions    Gastrointestinal: Denies nausea/vomitting/diarrhea, change in bowel habits, abdominal pain    Allergic/Immunologic: Denies adverse reactions to current medications   Musculoskeletal: see HPI     Objective:     Vital Signs (Most Recent):  Temp: 98.1 °F (36.7 °C) (23)  Pulse: 79 (23)  Resp: 17 (23)  BP: (!) 147/63 (23)  SpO2: 97 % (23) Vital Signs (24h Range):  Temp:  [98 °F (36.7 °C)-98.1 °F (36.7 °C)] 98.1 °F (36.7 °C)  Pulse:  [79-90] 79  Resp:  [16-18] 17  SpO2:  [97 %-98 %] 97 %  BP: (132-147)/(62-72) 147/63     Weight: 74.8 kg (165 lb)  Height: 5' 3" (160 cm)  Body mass index is 29.23 kg/m².    Ortho/SPM Exam  General: no acute distress, appears stated age     Neuro: alert and oriented x3   Psych: normal mood   Head: normocephalic, atraumatic.    Eyes: no scleral icterus   Mouth: moist mucous membranes   Cardiovascular: extremities warm and well perfused   Lungs: breathing comfortably, equal chest rise bilat   Skin: clean, dry, intact (any exceptions noted in below musculoskeletal exam)    Musculoskeletal:  LUE:  - Skin intact throughout, no open wounds  - No swelling  - No ecchymosis, erythema, or signs of cellulitis  - " NonTTP throughout  - AROM and PROM of the shoulder, elbow, wrist, and hand intact without pain  - Axillary/AIN/PIN/Radial/Median/Ulnar nerves assessed in isolation and are without deficit  - SILT throughout  - Compartments soft  - 2+ radial artery pulse  - Capillary Refill < 2 sec    RUE:  - Skin intact throughout, no open wounds  - No swelling  - No ecchymosis, erythema, or signs of cellulitis  - NonTTP throughout  - AROM and PROM of the shoulder, elbow, wrist, and hand intact without pain  - Axillary/AIN/PIN/Radial/Median/Ulnar nerves assessed in isolation and are without deficit  - SILT throughout  - Compartments soft  - 2+ radial artery pulse  - Capillary Refill < 2 sec    LLE:  - Skin intact throughout, no open wounds  - No swelling  - No ecchymosis, erythema, or signs of cellulitis  - Mild/moderate TTP over the greater trochanter, otherwise non TTP throughout  - AROM and PROM of the hip, knee, ankle, and foot intact without pain  - No pain with axial loading of the hip  - Negative straight leg raise  - TA/EHL/Gastroc/FHL assessed in isolation and are without deficit  - SILT throughout  - Compartments soft  - 2+ DP and PT pulses  - Capillary Refill < 2 sec  - Negative Log roll    RLE:  - Skin intact throughout, no open wounds  - No swelling  - No ecchymosis, erythema, or signs of cellulitis  - NonTTP throughout  - AROM and PROM of the hip, knee, ankle, and foot intact without pain  - TA/EHL/Gastroc/FHL assessed in isolation and are without deficit  - SILT throughout  - Compartments soft  - 2+ DP and PT pulses  - Capillary Refill < 2 sec  - Negative Log roll    Spine/pelvis/axial body:  No tenderness to palpation of cervical, thoracic, or lumbar spine  No pain with compression of pelvis    Significant Labs: All pertinent labs within the past 24 hours have been reviewed.    Significant Imaging: I have reviewed and interpreted all pertinent imaging results/findings.  X-ray and CT of the left hip with no acute  abnormalities.  Alignment within normal limits.  Minimal degenerative changes noted.

## 2023-05-09 NOTE — ED NOTES
Assumed care of patient. Pt remains on cardiac monitor, continuous pulse ox, and cycling blood pressures. Bed placed in low locked position, side rails up x2, call bell is within reach.    24-Dec-2020 12:03

## 2023-05-09 NOTE — ASSESSMENT & PLAN NOTE
- afebrile, VSS  - CRP/ESR wnl  - Xray showed no acute fracture  - CT Hip without acute process  - multimodal pain control  - Ortho consulted  No acute Orthopedic intervention  Weightbearing as tolerated to the left lower extremity   Conservative measures for trochanteric bursitis - NSAIDs, stretching, and PT

## 2023-05-09 NOTE — SUBJECTIVE & OBJECTIVE
Past Medical History:   Diagnosis Date    Atherosclerotic peripheral vascular disease     Carotid stenosis     Coronary atherosclerosis     RCA-    Diabetes mellitus, type 2     Diverticulitis large intestine 05/2014    Hypercholesterolemia     Hypertension     Trochanteric bursitis of left hip 5/9/2023       Past Surgical History:   Procedure Laterality Date    ILIAC ARTERY STENT Left 02/24/2010    Maria T 8 x 29 mm    ILIAC ARTERY STENT Right 02/17/2010    Maria T 8 x 60 mm    TUBAL LIGATION      TYMPANOTOMY  11/2018       Review of patient's allergies indicates:  No Known Allergies    No current facility-administered medications on file prior to encounter.     Current Outpatient Medications on File Prior to Encounter   Medication Sig    levocetirizine (XYZAL) 5 MG tablet levocetirizine 5 mg tablet   TAKE 1 TABLET BY MOUTH EVERY EVENING    methIMAzole (TAPAZOLE) 5 MG Tab methimazole 5 mg tablet   TAKE 1 TABLET BY MOUTH TWICE DAILY    aspirin (ECOTRIN) 81 MG EC tablet Take 81 mg by mouth once daily.    benzonatate (TESSALON) 100 MG capsule benzonatate 100 mg capsule    cyclobenzaprine (FLEXERIL) 10 MG tablet cyclobenzaprine 10 mg tablet   TAKE 1 TABLET BY MOUTH TWICE DAILY AS NEEDED FOR MUSCLE SPASM    diazePAM (VALIUM) 10 MG Tab diazepam 10 mg tablet   TAKE 1 TO 2 TABLET BY MOUTH 1 HOUR PRIOR TO APPOINTMENT    evolocumab (REPATHA SURECLICK) 140 mg/mL PnIj Inject 1 mL (140 mg total) into the skin every 14 (fourteen) days.    evolocumab (REPATHA SYRINGE) 140 mg/mL Syrg Inject 140 mg into the skin.    ezetimibe (ZETIA) 10 mg tablet Take 10 mg by mouth.    HYDROcodone-acetaminophen (NORCO) 5-325 mg per tablet hydrocodone 5 mg-acetaminophen 325 mg tablet   TAKE 1 TABLET BY MOUTH EVERY 8 HOURS AS NEEDED AS NEEDED FOR PAIN    hydrOXYzine pamoate (VISTARIL) 25 MG Cap hydroxyzine pamoate 25 mg capsule    losartan-hydrochlorothiazide 100-12.5 mg (HYZAAR) 100-12.5 mg Tab Take 1 tablet by mouth once daily.     oxyCODONE-acetaminophen (PERCOCET)  mg per tablet daily as needed.    varenicline (CHANTIX) 1 mg Tab Take 1 tablet (1 mg total) by mouth 2 (two) times daily.    zolpidem (AMBIEN) 10 mg Tab Take 10 mg by mouth once daily.     Family History       Problem Relation (Age of Onset)    Coronary artery disease Mother, Father    Diabetes Father    Hypertension Sister, Brother          Tobacco Use    Smoking status: Every Day     Types: Cigarettes     Last attempt to quit: 2021     Years since quittin.5    Smokeless tobacco: Never   Substance and Sexual Activity    Alcohol use: Yes    Drug use: Not on file    Sexual activity: Not on file     Review of Systems   Constitutional:  Negative for activity change, chills and fever.   HENT:  Negative for trouble swallowing.    Eyes:  Negative for photophobia and visual disturbance.   Respiratory:  Negative for cough, chest tightness and shortness of breath.    Cardiovascular:  Negative for chest pain, palpitations and leg swelling.   Gastrointestinal:  Negative for abdominal pain, constipation, diarrhea, nausea and vomiting.   Genitourinary:  Negative for dysuria, frequency and hematuria.   Musculoskeletal:  Positive for arthralgias and myalgias. Negative for back pain, gait problem and neck pain.   Skin:  Negative for rash and wound.   Neurological:  Negative for dizziness, syncope, speech difficulty and light-headedness.   Psychiatric/Behavioral:  Negative for agitation and confusion. The patient is not nervous/anxious.    Objective:     Vital Signs (Most Recent):  Temp: 97.7 °F (36.5 °C) (23 1131)  Pulse: 75 (23 1131)  Resp: 16 (23 113)  BP: 132/60 (23 1131)  SpO2: 97 % (23 113) Vital Signs (24h Range):  Temp:  [97.7 °F (36.5 °C)-98.1 °F (36.7 °C)] 97.7 °F (36.5 °C)  Pulse:  [75-90] 75  Resp:  [16-18] 16  SpO2:  [97 %-98 %] 97 %  BP: (132-147)/(60-72) 132/60     Weight: 74.8 kg (165 lb)  Body mass index is 29.23 kg/m².     Physical  Exam  Vitals and nursing note reviewed.   Constitutional:       General: She is not in acute distress.     Appearance: She is well-developed.   HENT:      Head: Normocephalic and atraumatic.      Mouth/Throat:      Pharynx: No oropharyngeal exudate.   Cardiovascular:      Rate and Rhythm: Normal rate and regular rhythm.      Heart sounds: Normal heart sounds.   Pulmonary:      Effort: Pulmonary effort is normal. No respiratory distress.      Breath sounds: Normal breath sounds. No wheezing.   Abdominal:      General: Bowel sounds are normal. There is no distension.      Palpations: Abdomen is soft.      Tenderness: There is no abdominal tenderness.   Musculoskeletal:         General: Tenderness (left hip) present. No swelling, deformity or signs of injury. Normal range of motion.   Skin:     General: Skin is warm and dry.      Capillary Refill: Capillary refill takes less than 2 seconds.      Findings: No rash.   Neurological:      Mental Status: She is alert and oriented to person, place, and time.      Cranial Nerves: No cranial nerve deficit.      Sensory: No sensory deficit.      Coordination: Coordination normal.              Significant Labs: All pertinent labs within the past 24 hours have been reviewed.    Significant Imaging: I have reviewed all pertinent imaging results/findings within the past 24 hours.

## 2023-05-09 NOTE — ASSESSMENT & PLAN NOTE
Lina Farmer is a 63 y.o. female with trochanteric bursitis of the left hip.  X-ray and CT of the left hip show no acute abnormalities.  Laboratory studies for infection within normal limits with a WBC count of 9.20, ESR of 9, and CRP of 1.8.  On exam she has isolated tenderness to palpation over the greater trochanter.  She does not have any pain with hip range of motion and axial loading of the hip.  Normal neurovascular exam.  She had a similar episode of trochanteric bursitis 2-3 years ago which resolved with conservative measures.    Plan:  No acute Orthopedic intervention  Weightbearing as tolerated to the left lower extremity   Conservative measures for trochanteric bursitis - NSAIDs, stretching, and PT    Dispo:  Outpatient Orthopedic follow-up

## 2023-05-09 NOTE — ED PROVIDER NOTES
Encounter Date: 2023       History     Chief Complaint   Patient presents with    Leg Pain     States L leg pain starting at 5 pm, denies trauma     63-year-old female with history of HTN, type 2 DM, HLD, arthritis, and PVD who presents to the ED for chief complaint of left leg pain, specifically in the area of the left thigh and hip.  Daughter is at bedside.  Patient states that that her pain started around 5:00 p.m..  She denies falling or any trauma.  Patient states that she took aspirin and a Percocet with little relief.  She is unable to bear weight on her left leg due to the pain.  Patient endorses 10/10 in pain severity when she applies pressure to her left leg.  She endorses intermittent lower back pain but notes that she has sciatica.  She denies any fevers, chest pain, SOB, nausea, and vomiting.    The history is provided by the patient. No  was used.   Review of patient's allergies indicates:  No Known Allergies  Past Medical History:   Diagnosis Date    Atherosclerotic peripheral vascular disease     Carotid stenosis     Coronary atherosclerosis     RCA-    Diabetes mellitus, type 2     Diverticulitis large intestine 2014    Hypercholesterolemia     Hypertension      Past Surgical History:   Procedure Laterality Date    ILIAC ARTERY STENT Left 2010    Maria T 8 x 29 mm    ILIAC ARTERY STENT Right 2010    Maria T 8 x 60 mm    TUBAL LIGATION      TYMPANOTOMY  2018     Family History   Problem Relation Age of Onset    Coronary artery disease Mother     Coronary artery disease Father     Diabetes Father     Hypertension Sister     Hypertension Brother      Social History     Tobacco Use    Smoking status: Every Day     Types: Cigarettes     Last attempt to quit: 2021     Years since quittin.5    Smokeless tobacco: Never   Substance Use Topics    Alcohol use: Yes     Review of Systems   Constitutional:  Negative for chills and fever.    Respiratory:  Negative for shortness of breath.    Cardiovascular:  Negative for chest pain.   Gastrointestinal:  Negative for nausea and vomiting.   Musculoskeletal:  Positive for back pain and gait problem.   Skin:  Negative for rash and wound.     Physical Exam     Initial Vitals [05/09/23 0022]   BP Pulse Resp Temp SpO2   (!) 140/62 90 18 98.1 °F (36.7 °C) 98 %      MAP       --         Physical Exam    Nursing note and vitals reviewed.  Constitutional: No distress.   HENT:   Head: Normocephalic.   Mouth/Throat: Oropharynx is clear and moist.   Eyes: Conjunctivae are normal. No scleral icterus.   Neck:   Normal range of motion.  Cardiovascular:  Normal rate, regular rhythm and normal heart sounds.           Pulmonary/Chest: Breath sounds normal. No respiratory distress.   Abdominal: Abdomen is soft. She exhibits no distension. There is no abdominal tenderness. There is no rebound and no guarding.   Musculoskeletal:         General: No tenderness or edema. Normal range of motion.      Cervical back: Normal range of motion.      Comments: Left leg, thigh, and hip are not tender to palpation.  Pain to left hip reproduced when patient stands.     Neurological: She is alert.   Skin: Skin is warm. Capillary refill takes less than 2 seconds.   Psychiatric: She has a normal mood and affect.       ED Course   Procedures  Labs Reviewed   CBC W/ AUTO DIFFERENTIAL - Abnormal; Notable for the following components:       Result Value    MPV 8.9 (*)     All other components within normal limits   COMPREHENSIVE METABOLIC PANEL - Abnormal; Notable for the following components:    Sodium 135 (*)     Anion Gap 6 (*)     All other components within normal limits   SEDIMENTATION RATE   C-REACTIVE PROTEIN          Imaging Results              CT Hip Without Contrast Left (Final result)  Result time 05/09/23 07:20:31      Final result by Vamshi Gonzalez MD (05/09/23 07:20:31)                   Impression:      No acute fracture  or dislocation.    Degenerative changes and mild bony demineralization.    Electronically signed by resident: Uvaldo Montes  Date:    05/09/2023  Time:    06:08    Electronically signed by: Vamshi Gonzalez MD  Date:    05/09/2023  Time:    07:20               Narrative:    EXAMINATION:  CT HIP WITHOUT CONTRAST LEFT    CLINICAL HISTORY:  Hip pain, stress fracture suspected, neg xray;    TECHNIQUE:  Multiple contiguous non contrast axial CT images of the left hip were obtained. Images were reformatted in the coronal and sagittal planes and reviewed in the bone and soft tissue windows.  No contrast was administered.    COMPARISON:  Hip radiograph 05/09/2023.  CT lumbar spine, 03/13/2022.    FINDINGS:  Bones are mildly demineralized with mild patchy lucency in the left iliac bone adjacent to the sacroiliac joint, similar to prior lumbar spine CT.  Milder patchy lucency in the left femoral neck which could also be related to bony demineralization.    The femoral head is well located with respect to the acetabulum.    Femoral head maintains a normal round contour.    No acute fracture seen.  No dislocation.  Chronic appearing calcification along the lateral aspect of the greater trochanter.    Moderate degenerative changes of the sacroiliac joint and pubic symphysis.    No significant soft tissue edema or radiopaque retained foreign body.    Partially visualized left external iliac stent.  Calcific atherosclerosis of the vessels.  Partially visualized colonic diverticulosis.                                       X-Ray Hip 2 or 3 views Left (with Pelvis when performed) (Final result)  Result time 05/09/23 03:18:25      Final result by José Luis Graham MD (05/09/23 03:18:25)                   Impression:      No acute fracture or dislocation left hip.      Electronically signed by: José Luis Graham MD  Date:    05/09/2023  Time:    03:18               Narrative:    EXAMINATION:  XR HIP WITH PELVIS WHEN PERFORMED, 2 OR 3  VIEWS LEFT    CLINICAL HISTORY:  Pain in left thigh    TECHNIQUE:  AP view of the pelvis and frog leg lateral view of the left hip were performed.    COMPARISON:  None    FINDINGS:  No acute displaced pelvic fracture.  No acute fracture or dislocation left hip.  Mild degenerative change.  Vascular calcifications present.  Bilateral iliac stents present.                                       Medications   LIDOcaine 5 % patch 1 patch (1 patch Transdermal Patch Applied 5/9/23 5295)   sodium chloride 0.9% flush 10 mL (has no administration in time range)   melatonin tablet 6 mg (has no administration in time range)   ondansetron disintegrating tablet 8 mg (has no administration in time range)   promethazine tablet 25 mg (has no administration in time range)   polyethylene glycol packet 17 g (has no administration in time range)   bisacodyL suppository 10 mg (has no administration in time range)   glucagon (human recombinant) injection 1 mg (has no administration in time range)   nicotine 14 mg/24 hr 1 patch (has no administration in time range)   dextrose 10% bolus 125 mL 125 mL (has no administration in time range)   dextrose 10% bolus 250 mL 250 mL (has no administration in time range)   dextrose 40 % gel 15,000 mg (has no administration in time range)   dextrose 40 % gel 30,000 mg (has no administration in time range)   HYDROcodone-acetaminophen 5-325 mg per tablet 1 tablet (has no administration in time range)   HYDROcodone-acetaminophen  mg per tablet 1 tablet (has no administration in time range)   methocarbamoL tablet 1,000 mg (1,000 mg Oral Given 5/9/23 0308)   morphine injection 4 mg (4 mg Intravenous Given 5/9/23 0507)   ondansetron injection 4 mg (4 mg Intravenous Given 5/9/23 0506)   morphine injection 4 mg (4 mg Intravenous Given 5/9/23 0556)     Medical Decision Making:   Initial Assessment:   Evaluation of 63-year-old female who presents with left-sided thigh and hip pain.  She is laying on the bed  in mild discomfort.  She is afebrile and mildly hypertensive.  She is saturating well on room air.  Differential Diagnosis:   MSK pain, IT band syndrome, arthritis, sciatica, occult fracture  Clinical Tests:   Lab Tests: Ordered and Reviewed  Radiological Study: Ordered and Reviewed  ED Management:  Patient presents with left hip and thigh pain that is produced while standing and walking.  On exam, her hip and leg can be ranged well without pain.  There is no trauma, edema, or erythema.  I have low suspicion for a septic joint.  Administered Robaxin and morphine for pain management.  X-ray of the left hip shows no dislocation or fracture.  Patient was reassessed and she still has pain when standing.  I obtained labs to evaluate for inflammation and obtained a CT of the left hip.  CT shows degenerative changes without dislocation or fracture.    I discussed patient with the change of shift ED team.  Plan for patient to be admitted to the hospital if she continues to have intractable left hip pain and cannot walk.          Attending Attestation:   Physician Attestation Statement for Resident:  As the supervising MD   Physician Attestation Statement: I have personally seen and examined this patient.   I agree with the above history.  -:   As the supervising MD I agree with the above PE.     As the supervising MD I agree with the above treatment, course, plan, and disposition.    I have reviewed and agree with the residents interpretation of the following: x-rays.               ED Course as of 05/09/23 0826   Tue May 09, 2023   0332 X-ray shows no fracture or dislocation of the hip. [MD]      ED Course User Index  [MD] Ld Allen MD                 Clinical Impression:   Final diagnoses:  [M79.652] Left thigh pain  [M25.552] Left hip pain (Primary)        ED Disposition Condition    Observation                 Ld Allen MD  Resident  05/09/23 0827       Carina Munroe MD  05/09/23 8052

## 2023-05-13 ENCOUNTER — HOSPITAL ENCOUNTER (EMERGENCY)
Facility: HOSPITAL | Age: 63
Discharge: HOME | End: 2023-05-13
Attending: EMERGENCY MEDICINE
Payer: MEDICARE

## 2023-05-13 VITALS
RESPIRATION RATE: 15 BRPM | TEMPERATURE: 98 F | SYSTOLIC BLOOD PRESSURE: 140 MMHG | OXYGEN SATURATION: 100 % | WEIGHT: 163 LBS | HEART RATE: 73 BPM | DIASTOLIC BLOOD PRESSURE: 64 MMHG | BODY MASS INDEX: 28.87 KG/M2

## 2023-05-13 DIAGNOSIS — M70.62 TROCHANTERIC BURSITIS OF LEFT HIP: Primary | ICD-10-CM

## 2023-05-13 LAB
ALBUMIN SERPL BCP-MCNC: 3.7 G/DL (ref 3.5–5.2)
ALP SERPL-CCNC: 82 U/L (ref 55–135)
ALT SERPL W/O P-5'-P-CCNC: 14 U/L (ref 10–44)
ANION GAP SERPL CALC-SCNC: 9 MMOL/L (ref 8–16)
AST SERPL-CCNC: 15 U/L (ref 10–40)
BASOPHILS # BLD AUTO: 0.05 K/UL (ref 0–0.2)
BASOPHILS NFR BLD: 0.5 % (ref 0–1.9)
BILIRUB SERPL-MCNC: 0.5 MG/DL (ref 0.1–1)
BUN SERPL-MCNC: 18 MG/DL (ref 8–23)
CALCIUM SERPL-MCNC: 10.3 MG/DL (ref 8.7–10.5)
CHLORIDE SERPL-SCNC: 102 MMOL/L (ref 95–110)
CO2 SERPL-SCNC: 26 MMOL/L (ref 23–29)
CREAT SERPL-MCNC: 1.1 MG/DL (ref 0.5–1.4)
CRP SERPL-MCNC: 1.9 MG/L (ref 0–8.2)
DIFFERENTIAL METHOD: ABNORMAL
EOSINOPHIL # BLD AUTO: 0.1 K/UL (ref 0–0.5)
EOSINOPHIL NFR BLD: 1.3 % (ref 0–8)
ERYTHROCYTE [DISTWIDTH] IN BLOOD BY AUTOMATED COUNT: 12 % (ref 11.5–14.5)
ERYTHROCYTE [SEDIMENTATION RATE] IN BLOOD BY PHOTOMETRIC METHOD: 5 MM/HR (ref 0–36)
EST. GFR  (NO RACE VARIABLE): 55.8 ML/MIN/1.73 M^2
GLUCOSE SERPL-MCNC: 92 MG/DL (ref 70–110)
HCT VFR BLD AUTO: 44.3 % (ref 37–48.5)
HGB BLD-MCNC: 14.5 G/DL (ref 12–16)
IMM GRANULOCYTES # BLD AUTO: 0.03 K/UL (ref 0–0.04)
IMM GRANULOCYTES NFR BLD AUTO: 0.3 % (ref 0–0.5)
LYMPHOCYTES # BLD AUTO: 3.4 K/UL (ref 1–4.8)
LYMPHOCYTES NFR BLD: 32 % (ref 18–48)
MCH RBC QN AUTO: 29.4 PG (ref 27–31)
MCHC RBC AUTO-ENTMCNC: 32.7 G/DL (ref 32–36)
MCV RBC AUTO: 90 FL (ref 82–98)
MONOCYTES # BLD AUTO: 0.9 K/UL (ref 0.3–1)
MONOCYTES NFR BLD: 8.5 % (ref 4–15)
NEUTROPHILS # BLD AUTO: 6 K/UL (ref 1.8–7.7)
NEUTROPHILS NFR BLD: 57.4 % (ref 38–73)
NRBC BLD-RTO: 0 /100 WBC
PLATELET # BLD AUTO: 271 K/UL (ref 150–450)
PMV BLD AUTO: 8.7 FL (ref 9.2–12.9)
POTASSIUM SERPL-SCNC: 4.7 MMOL/L (ref 3.5–5.1)
PROT SERPL-MCNC: 6.9 G/DL (ref 6–8.4)
RBC # BLD AUTO: 4.93 M/UL (ref 4–5.4)
SODIUM SERPL-SCNC: 137 MMOL/L (ref 136–145)
WBC # BLD AUTO: 10.48 K/UL (ref 3.9–12.7)

## 2023-05-13 PROCEDURE — 80053 COMPREHEN METABOLIC PANEL: CPT | Performed by: EMERGENCY MEDICINE

## 2023-05-13 PROCEDURE — 63600175 PHARM REV CODE 636 W HCPCS: Performed by: EMERGENCY MEDICINE

## 2023-05-13 PROCEDURE — 99284 PR EMERGENCY DEPT VISIT,LEVEL IV: ICD-10-PCS | Mod: ,,, | Performed by: EMERGENCY MEDICINE

## 2023-05-13 PROCEDURE — 96374 THER/PROPH/DIAG INJ IV PUSH: CPT

## 2023-05-13 PROCEDURE — 86140 C-REACTIVE PROTEIN: CPT | Performed by: EMERGENCY MEDICINE

## 2023-05-13 PROCEDURE — 85025 COMPLETE CBC W/AUTO DIFF WBC: CPT | Performed by: EMERGENCY MEDICINE

## 2023-05-13 PROCEDURE — 99284 EMERGENCY DEPT VISIT MOD MDM: CPT | Mod: ,,, | Performed by: EMERGENCY MEDICINE

## 2023-05-13 PROCEDURE — 99284 EMERGENCY DEPT VISIT MOD MDM: CPT | Mod: 25

## 2023-05-13 PROCEDURE — 85652 RBC SED RATE AUTOMATED: CPT | Performed by: EMERGENCY MEDICINE

## 2023-05-13 RX ORDER — NAPROXEN 500 MG/1
500 TABLET ORAL 2 TIMES DAILY WITH MEALS
Qty: 60 TABLET | Refills: 0 | Status: SHIPPED | OUTPATIENT
Start: 2023-05-13

## 2023-05-13 RX ORDER — KETOROLAC TROMETHAMINE 30 MG/ML
15 INJECTION, SOLUTION INTRAMUSCULAR; INTRAVENOUS
Status: COMPLETED | OUTPATIENT
Start: 2023-05-13 | End: 2023-05-13

## 2023-05-13 RX ADMIN — KETOROLAC TROMETHAMINE 15 MG: 30 INJECTION, SOLUTION INTRAMUSCULAR; INTRAVENOUS at 11:05

## 2023-05-13 NOTE — ED PROVIDER NOTES
"Encounter Date: 5/13/2023       History     Chief Complaint   Patient presents with    Hip Pain     Originally called out as Chest pain. Upon arrival, pt states she is having L hip and thigh pain, recent Dx bursitis to L hip. Pt took 81 ASA prior to EMS arrival. En route, pt states "it's not my chest, it's my hip!". Possible anxiety causing chest discomfort. EMS gave 3 81 ASA prior to learning about hip pain. Pt non-ambulatory r/t hip pain       63-year-old past medical history of recently diagnosed trochanteric bursitis of left hip presenting with left hip pain.  Patient mentions having left hip pain intermittently for past 1-2 weeks.  Patient was recently admitted to hospital seen by orthopedics had x-ray and CTs with no significant abnormality diagnosed with trochanteric bursitis and discharged home.  Patient mentions since discharge initially feeling improved saw outpatient orthopedics at [facility] had steroid injection to left trochanteric bursa patient initially felt well up until yesterday when pain returned and is more severe.  Patient now mentions she is unable to walk denies any new trauma, numbness, tingling, weakness.  Patient mentions using opioids at home for pain control has not been using NSAIDs.             Review of patient's allergies indicates:  No Known Allergies  Past Medical History:   Diagnosis Date    Atherosclerotic peripheral vascular disease     Carotid stenosis     Coronary atherosclerosis     RCA-    Diabetes mellitus, type 2     Diverticulitis large intestine 05/2014    Hypercholesterolemia     Hypertension     Trochanteric bursitis of left hip 5/9/2023     Past Surgical History:   Procedure Laterality Date    ILIAC ARTERY STENT Left 02/24/2010    Maria T 8 x 29 mm    ILIAC ARTERY STENT Right 02/17/2010    Maria T 8 x 60 mm    TUBAL LIGATION      TYMPANOTOMY  11/2018     Family History   Problem Relation Age of Onset    Coronary artery disease Mother     Coronary artery disease Father  "    Diabetes Father     Hypertension Sister     Hypertension Brother      Social History     Tobacco Use    Smoking status: Every Day     Types: Cigarettes     Last attempt to quit: 2021     Years since quittin.5    Smokeless tobacco: Never   Substance Use Topics    Alcohol use: Yes    Drug use: Never     Review of Systems    Physical Exam     Initial Vitals [23 1007]   BP Pulse Resp Temp SpO2   (!) 148/94 82 (!) 22 96.4 °F (35.8 °C) 98 %      MAP       --         Physical Exam    Constitutional: She appears well-developed and well-nourished.   HENT:   Head: Normocephalic and atraumatic.   Eyes: Conjunctivae, EOM and lids are normal. Pupils are equal, round, and reactive to light.   Neck: Trachea normal.   Normal range of motion.   Full passive range of motion without pain.     Musculoskeletal:         General: Tenderness present.      Cervical back: Full passive range of motion without pain and normal range of motion.      Comments: Point tenderness to palpation or left greater trochanter no significant pain with range of motion of hip.     Neurological: She is alert and oriented to person, place, and time. She has normal strength. GCS eye subscore is 4. GCS verbal subscore is 5. GCS motor subscore is 6.   Strength 5/5 hip normal range of motion active and passive  Sensation grossly intact to light touch throughout  DP PT pulses 2+ full no overlying skin changes of left lower extremity   Skin: Skin is intact.   Psychiatric: She has a normal mood and affect. Her speech is normal and behavior is normal. Judgment and thought content normal.       ED Course   Procedures  Labs Reviewed   CBC W/ AUTO DIFFERENTIAL - Abnormal; Notable for the following components:       Result Value    MPV 8.7 (*)     All other components within normal limits   COMPREHENSIVE METABOLIC PANEL - Abnormal; Notable for the following components:    eGFR 55.8 (*)     All other components within normal limits   SEDIMENTATION RATE    C-REACTIVE PROTEIN          Imaging Results    None          Medications   ketorolac injection 15 mg (15 mg Intravenous Given 5/13/23 1146)     Medical Decision Making:   History:   I obtained history from: someone other than patient.       <> Summary of History: Patient accompanied by family members endorses pain became much worse yesterday.  Old Medical Records: I decided to obtain old medical records.  Initial Assessment:   63-year-old past medical history of left trochanteric bursitis presenting with left hip pain.  Differential Diagnosis:   DX includes bursitis, muscular strain, muscular sprain low likelihood of fracture dislocation.  Clinical Tests:   Lab Tests: Reviewed and Ordered  ED Management:  Plan:  Obtain CBC CMP analgesia and reassess.           ED Course as of 05/13/23 1300   Sat May 13, 2023   1122 Hip pain for 2 weeks [facility] now much worse since yesterday , unable to ambulate  [DC]   1123 Trochanteric bursitis, point TTP of right brusa  [DC]   1259 Patient mentions pain is improved from 10/10 now 3/10 laboratory testing unremarkable will DC home with NSAIDs patient instructed to take NSAID therapy rather than opioids at this time follow-up for physical therapy with orthopedics.  Patient safer plan discharge home at this time. [DC]      ED Course User Index  [DC] Sanjana Brown Jr., MD                   Clinical Impression:   Final diagnoses:  [M70.62] Trochanteric bursitis of left hip (Primary)        ED Disposition Condition    Discharge Stable          ED Prescriptions       Medication Sig Dispense Start Date End Date Auth. Provider    naproxen (NAPROSYN) 500 MG tablet Take 1 tablet (500 mg total) by mouth 2 (two) times daily with meals. 60 tablet 5/13/2023 -- Sanjana Brown Jr., MD          Follow-up Information       Follow up With Specialties Details Why Contact Info    Kimmy Kraus MD Family Medicine In 1 week  [address]  [place]  [address]  [facility] [address]  [phone]      [facility] Dept Emergency Medicine  If symptoms worsen [address] [facility] [id number] [phone]             Sanjana Brown Jr., MD  05/13/23 1300

## 2023-05-13 NOTE — DISCHARGE INSTRUCTIONS
Please return if having any severe pains, fevers chills or generally feel worse.  Please follow-up with orthopedist and begin taking prescribed medication for pain.

## 2023-05-15 ENCOUNTER — CLINICAL SUPPORT (OUTPATIENT)
Dept: REHABILITATION | Facility: HOSPITAL | Age: 63
End: 2023-05-15
Payer: MEDICARE

## 2023-05-15 DIAGNOSIS — M70.62 TROCHANTERIC BURSITIS OF LEFT HIP: ICD-10-CM

## 2023-05-15 DIAGNOSIS — M25.652 DECREASED RANGE OF LEFT HIP MOVEMENT: ICD-10-CM

## 2023-05-15 DIAGNOSIS — R26.89 IMPAIRED GAIT AND MOBILITY: ICD-10-CM

## 2023-05-15 PROCEDURE — 97161 PT EVAL LOW COMPLEX 20 MIN: CPT

## 2023-05-17 ENCOUNTER — SPECIALTY PHARMACY (OUTPATIENT)
Dept: PHARMACY | Facility: CLINIC | Age: 63
End: 2023-05-17
Payer: MEDICARE

## 2023-05-17 NOTE — TELEPHONE ENCOUNTER
Specialty Pharmacy - Refill Coordination    Specialty Medication Orders Linked to Encounter      Flowsheet Row Most Recent Value   Medication #1 evolocumab (REPATHA SURECLICK) 140 mg/mL PnIj (Order#[id number], Rx#[id number])            Refill Questions - Documented Responses      Flowsheet Row Most Recent Value   Patient Availability and HIPAA Verification    Does patient want to proceed with activity? Yes   HIPAA/medical authority confirmed? Yes   Relationship to patient of person spoken to? Self   Refill Screening Questions    Would patient like to speak to a pharmacist? No   When does the patient need to receive the medication? 05/24/23   Refill Delivery Questions    How will the patient receive the medication? MED[facility]   When does the patient need to receive the medication? 05/24/23   Shipping Address Home   Address in [facility] confirmed and updated if neccessary? Yes   Expected Copay ($) 0   Is the patient able to afford the medication copay? Yes   Payment Method zero copay   Days supply of Refill 28   Supplies needed? No supplies needed   Refill activity completed? Yes   Refill activity plan Refill scheduled   Shipment/Pickup Date: 05/18/23            Current Outpatient Medications   Medication Sig    aspirin (ECOTRIN) 81 MG EC tablet Take 81 mg by mouth once daily.    benzonatate (TESSALON) 100 MG capsule benzonatate 100 mg capsule    cyclobenzaprine (FLEXERIL) 10 MG tablet cyclobenzaprine 10 mg tablet   TAKE 1 TABLET BY MOUTH TWICE DAILY AS NEEDED FOR MUSCLE SPASM    diazePAM (VALIUM) 10 MG Tab diazepam 10 mg tablet   TAKE 1 TO 2 TABLET BY MOUTH 1 HOUR PRIOR TO APPOINTMENT    evolocumab (REPATHA SURECLICK) 140 mg/mL PnIj Inject 1 mL (140 mg total) into the skin every 14 (fourteen) days.    evolocumab (REPATHA SYRINGE) 140 mg/mL Syrg Inject 140 mg into the skin.    ezetimibe (ZETIA) 10 mg tablet Take 10 mg by mouth.    HYDROcodone-acetaminophen (NORCO) 5-325 mg per tablet hydrocodone 5 mg-acetaminophen  325 mg tablet   TAKE 1 TABLET BY MOUTH EVERY 8 HOURS AS NEEDED AS NEEDED FOR PAIN    hydrOXYzine pamoate (VISTARIL) 25 MG Cap hydroxyzine pamoate 25 mg capsule    levocetirizine (XYZAL) 5 MG tablet levocetirizine 5 mg tablet   TAKE 1 TABLET BY MOUTH EVERY EVENING    losartan-hydrochlorothiazide 100-12.5 mg (HYZAAR) 100-12.5 mg Tab Take 1 tablet by mouth once daily.    methIMAzole (TAPAZOLE) 5 MG Tab methimazole 5 mg tablet   TAKE 1 TABLET BY MOUTH TWICE DAILY    naproxen (NAPROSYN) 500 MG tablet Take 1 tablet (500 mg total) by mouth 2 (two) times daily with meals.    oxyCODONE-acetaminophen (PERCOCET)  mg per tablet daily as needed.    varenicline (CHANTIX) 1 mg Tab Take 1 tablet (1 mg total) by mouth 2 (two) times daily.    zolpidem (AMBIEN) 10 mg Tab Take 10 mg by mouth once daily.   Last reviewed on 5/13/2023 11:04 AM by Del Robert RN    Review of patient's allergies indicates:  No Known Allergies Last reviewed on  5/13/2023 11:03 AM by Del Robert      Tasks added this encounter   No tasks added.   Tasks due within next 3 months   5/17/2023 - Refill Coordination Outreach (1 time occurrence)     Christophe Mendez, PharmD  [facility]  [address] [place] [id number]  Phone: [phone]  Fax: [phone]

## 2023-05-18 PROBLEM — M25.652 DECREASED RANGE OF LEFT HIP MOVEMENT: Status: ACTIVE | Noted: 2023-05-18

## 2023-05-18 PROBLEM — R26.89 IMPAIRED GAIT AND MOBILITY: Status: ACTIVE | Noted: 2023-05-18

## 2023-05-18 NOTE — PLAN OF CARE
[facility]   Physical Therapy Initial Evaluation     Date: 5/15/2023   Name: Lina Farmer  Clinic Number: [id number]    Therapy Diagnosis:   Encounter Diagnoses   Name Primary?    Trochanteric bursitis of left hip     Decreased range of left hip movement     Impaired gait and mobility      Physician: Zo Reilly PA-C    Physician Orders: PT Eval and Treat   Medical Diagnosis from Referral: M70.62 (ICD-10-CM) - Trochanteric bursitis of left hip  Evaluation Date: 5/15/2023  Authorization Period Expiration: 12/31/2023  Plan of Care Expiration: 7/15/2023  Progress Note Due: 6/15/2023  Visit # / Visits authorized: 1/ 1 (pending)  FOTO: 0/ 3  (Perform FOTO next visit)    Precautions: Standard    Time In: 8:20 AM (late arrival)  Time Out: 8:43 AM  Total Appointment Time (timed & untimed codes): 23 minutes    SUBJECTIVE   Date of onset: 2 weeks ago    History of current condition - Lina reports insidious onset of Left hip pain that began about 2 weeks ago. She went to ER because of severe pain. She received an injection about a week ago which didn't give her much relief. She went to ER a second time two days ago and was prescibred pain medication. She reports not being able to walk the last few days because it hurts too much to weight bear on her Left LE.     Falls: no    Imaging: see imaging section    Prior Therapy: no  Social History: lives with spouse  Occupation: retired   Prior Level of Function: Independent  Current Level of Function: difficulty walking and standing    Pain:  Current 3/10, worst 10/10, best 3/10   Location: Left hip  Description: sharp  Aggravating Factors: standing, walking  Easing Factors: ice, pain medication    Patients goals: decrease pain, walk normally     Medical History:   Past Medical History:   Diagnosis Date    Atherosclerotic peripheral vascular disease     Carotid stenosis     Coronary atherosclerosis     RCA-    Diabetes mellitus, type 2      Diverticulitis large intestine 05/2014    Hypercholesterolemia     Hypertension     Trochanteric bursitis of left hip 5/9/2023     Surgical History:   Lina Farmer  has a past surgical history that includes Tympanotomy (11/2018); Tubal ligation; Iliac artery stent (Left, 02/24/2010); and Iliac artery stent (Right, 02/17/2010).    Medications:   Lina has a current medication list which includes the following prescription(s): aspirin, benzonatate, cyclobenzaprine, diazepam, repatha sureclick, repatha syringe, ezetimibe, hydrocodone-acetaminophen, hydroxyzine pamoate, levocetirizine, losartan-hydrochlorothiazide 100-12.5 mg, methimazole, naproxen, oxycodone-acetaminophen, varenicline, and zolpidem.    Allergies:   Review of patient's allergies indicates:  No Known Allergies       OBJECTIVE     Lumbar Range of Motion:    % Limitation Pain Goal   Flexion NT due to pain in weight bearing         Full and pain free   Extension NT due to pain in weight bearing         Full and pain free   Left Side Bending NT due to pain in weight bearing       Full and pain free   Right Side Bending NT due to pain in weight bearing       Full and pain free   Left rotation   NT due to pain in weight bearing       Full and pain free   Right Rotation   NT due to pain in weight bearing       Full and pain free      Hip Range of Motion:   Left Goal   Hip Abduction WNL 40 deg.   Hip External Rotation (hip flexed to 90) WNL, pain at end range 45 deg.   Hip Internal Rotation (hip flexed to 90) WNL 45 deg.   Hip Flexion  deg.       Lower Extremity Strength  Right LE  Left LE  Goal   Knee extension: 5/5 Knee extension: 5/5 5/5   Knee flexion: 5/5 Knee flexion: 5/5 5/5   Hip flexion: 5/5 Hip flexion: 4+/5 5/5   Hip extension:  NT Hip extension: NT 5/5   Hip abduction: NT Hip abduction: NT 5/5       Neuro Dynamic Testing:    Sciatic nerve:      SLR: R = Negative     L = Negative         Palpation: moderate TTP Left greater  trochanter, glute med      Limitation/Restriction for FOTO Hip Survey    Therapist reviewed FOTO scores for Lina Farmer on 5/15/2023.   FOTO documents entered into MMIS - see Media section.    Limitation Score: TBD%         TREATMENT     Total Treatment time (time-based codes) separate from Evaluation: 0 minutes     Lina received the treatments listed below:    PATIENT EDUCATION AND HOME EXERCISES   Education provided:   Role of Physical Therapist  Physical Therapy Plan Of Care  Education on objective findings      Written Home Exercises Provided:   HEP to be provided next visit      ASSESSMENT   Lina is a 63 y.o. female referred to outpatient Physical Therapy with a medical diagnosis of M70.62 (ICD-10-CM) - Trochanteric bursitis of left hip. Upon physical assessment, patient demonstrates  increased pain with hip PROM, , increased pain with weight bearing L LE, impaired gait, tenderness to palpation to Left greater trochanter and glute med. Patient prognosis is Good. Patient will benefit from skilled outpatient Physical Therapy to address the deficits stated above and in the chart below, provide patient/family education, and to maximize patient's level of independence and quality of life.     Plan of care discussed with patient: Yes  Patient's spiritual, cultural and educational needs considered and patient is agreeable to the plan of care and goals as stated below:     Anticipated Barriers for therapy: none    Medical Necessity is demonstrated by the following  History  Co-morbidities and personal factors that may impact the plan of care Co-morbidities:   HTN, diabetes    Personal Factors:   age     low   Examination  Body Structures and Functions, activity limitations and participation restrictions that may impact the plan of care Body Regions:   Left hip    Body Systems:    ROM  strength  balance  gait    Participation Restrictions:   Standing, walking, squatting, household chores    Activity  limitations:   Learning and applying knowledge  no deficits    General Tasks and Commands  no deficits    Communication  no deficits    Mobility  lifting and carrying objects  walking    Self care  dressing    Domestic Life  doing house work (cleaning house, washing dishes, laundry)    Interactions/Relationships  no deficits    Life Areas  no deficits    Community and Social Life  no deficits         low   Clinical Presentation stable and uncomplicated low   Decision Making/ Complexity Score: low     Goals:    SHORT TERM GOALS:  4 weeks 5/15/2023   Recent signs and systems trend is improving in order to progress towards LTG's.    Patient will be independent with HEP in order to further progress and return to maximal function.    Pain rating at Worst: 5/10 in order to progress towards increased independence with activity.    Patient will be able to correct postural deviations in sitting and standing, to decrease pain and promote postural awareness for injury prevention.       LONG TERM GOALS: 8 weeks 5/15/2023   Patient will return to normal ADL related activities with less pain and limitation.     Patient will improve Strength to stated goals of appropriate musculature in order to improve functional independence.     Pain Rating at Worst: 2/10 to improve Quality of Life.     Patient will meet predicted functional outcome (FOTO) score: 30% decrease in limitation to increase self-worth & perceived functional ability.    Patient will have met personal goal of: walking without increase in Left hip symptoms.         PLAN   Plan of care Certification: 5/15/2023 to 7/15/2023.    Outpatient Physical Therapy 1-2 times weekly for 6-8 weeks to include the following interventions: Manual Therapy, Moist Heat/ Ice, Neuromuscular Re-ed, Patient Education, Therapeutic Activities, Therapeutic Exercise, and Modalities.     Teresa Sharp, PT      I CERTIFY THE NEED FOR THESE SERVICES FURNISHED UNDER THIS PLAN OF TREATMENT AND WHILE  UNDER MY CARE   Physician's comments:     Physician's Signature: ___________________________________________________

## 2023-06-08 ENCOUNTER — SPECIALTY PHARMACY (OUTPATIENT)
Dept: PHARMACY | Facility: CLINIC | Age: 63
End: 2023-06-08
Payer: MEDICARE

## 2023-06-08 NOTE — TELEPHONE ENCOUNTER
Outgoing call to pt regarding repatha refill. Next injection 6/21. Will follow up 6/14 for refill.

## 2023-06-12 DIAGNOSIS — E78.00 HYPERCHOLESTEROLEMIA: Primary | ICD-10-CM

## 2023-06-12 RX ORDER — EZETIMIBE 10 MG/1
10 TABLET ORAL DAILY
Qty: 90 TABLET | Refills: 3 | Status: SHIPPED | OUTPATIENT
Start: 2023-06-12 | End: 2023-10-20

## 2023-07-10 ENCOUNTER — SPECIALTY PHARMACY (OUTPATIENT)
Dept: PHARMACY | Facility: CLINIC | Age: 63
End: 2023-07-10
Payer: MEDICARE

## 2023-07-10 NOTE — TELEPHONE ENCOUNTER
Specialty Pharmacy - Refill Coordination    Specialty Medication Orders Linked to Encounter      Flowsheet Row Most Recent Value   Medication #1 evolocumab (REPATHA SURECLICK) 140 mg/mL PnIj (Order#[id number], Rx#[id number])            Refill Questions - Documented Responses      Flowsheet Row Most Recent Value   Patient Availability and HIPAA Verification    Does patient want to proceed with activity? Yes   HIPAA/medical authority confirmed? Yes   Relationship to patient of person spoken to? Self   Refill Screening Questions    Would patient like to speak to a pharmacist? No   When does the patient need to receive the medication? 07/19/23   Refill Delivery Questions    How will the patient receive the medication? MEDRx   When does the patient need to receive the medication? 07/19/23   Shipping Address Home   Address in [facility] confirmed and updated if neccessary? Yes   Expected Copay ($) 0   Is the patient able to afford the medication copay? Yes   Payment Method zero copay   Days supply of Refill 28   Supplies needed? No supplies needed   Refill activity completed? Yes   Refill activity plan Refill scheduled   Shipment/Pickup Date: 07/17/23            Current Outpatient Medications   Medication Sig    aspirin (ECOTRIN) 81 MG EC tablet Take 81 mg by mouth once daily.    benzonatate (TESSALON) 100 MG capsule benzonatate 100 mg capsule    cyclobenzaprine (FLEXERIL) 10 MG tablet cyclobenzaprine 10 mg tablet   TAKE 1 TABLET BY MOUTH TWICE DAILY AS NEEDED FOR MUSCLE SPASM    diazePAM (VALIUM) 10 MG Tab diazepam 10 mg tablet   TAKE 1 TO 2 TABLET BY MOUTH 1 HOUR PRIOR TO APPOINTMENT    evolocumab (REPATHA SURECLICK) 140 mg/mL PnIj Inject 1 mL (140 mg total) into the skin every 14 (fourteen) days.    evolocumab (REPATHA SYRINGE) 140 mg/mL Syrg Inject 140 mg into the skin.    ezetimibe (ZETIA) 10 mg tablet Take 1 tablet (10 mg total) by mouth once daily.    HYDROcodone-acetaminophen (NORCO) 5-325 mg per tablet  hydrocodone 5 mg-acetaminophen 325 mg tablet   TAKE 1 TABLET BY MOUTH EVERY 8 HOURS AS NEEDED AS NEEDED FOR PAIN    hydrOXYzine pamoate (VISTARIL) 25 MG Cap hydroxyzine pamoate 25 mg capsule    levocetirizine (XYZAL) 5 MG tablet levocetirizine 5 mg tablet   TAKE 1 TABLET BY MOUTH EVERY EVENING    losartan-hydrochlorothiazide 100-12.5 mg (HYZAAR) 100-12.5 mg Tab Take 1 tablet by mouth once daily.    methIMAzole (TAPAZOLE) 5 MG Tab methimazole 5 mg tablet   TAKE 1 TABLET BY MOUTH TWICE DAILY    naproxen (NAPROSYN) 500 MG tablet Take 1 tablet (500 mg total) by mouth 2 (two) times daily with meals.    oxyCODONE-acetaminophen (PERCOCET)  mg per tablet daily as needed.    varenicline (CHANTIX) 1 mg Tab Take 1 tablet (1 mg total) by mouth 2 (two) times daily.    zolpidem (AMBIEN) 10 mg Tab Take 10 mg by mouth once daily.   Last reviewed on 5/13/2023 11:04 AM by Del Robert RN    Review of patient's allergies indicates:  No Known Allergies Last reviewed on  5/13/2023 11:03 AM by Del Robert      Tasks added this encounter   No tasks added.   Tasks due within next 3 months   7/10/2023 - Refill Coordination Outreach (1 time occurrence)     Zandra Thomas, Patient Care Assistant  [facility]  [facility] [address]  [facility] [address]  Phone: [phone]  Fax: [phone]

## 2023-08-07 ENCOUNTER — SPECIALTY PHARMACY (OUTPATIENT)
Dept: PHARMACY | Facility: CLINIC | Age: 63
End: 2023-08-07
Payer: MEDICARE

## 2023-09-23 ENCOUNTER — HOSPITAL ENCOUNTER (EMERGENCY)
Facility: HOSPITAL | Age: 63
Discharge: HOME | End: 2023-09-23
Attending: STUDENT IN AN ORGANIZED HEALTH CARE EDUCATION/TRAINING PROGRAM
Payer: MEDICARE

## 2023-09-23 VITALS
BODY MASS INDEX: 27.26 KG/M2 | RESPIRATION RATE: 18 BRPM | OXYGEN SATURATION: 97 % | TEMPERATURE: 99 F | WEIGHT: 153.88 LBS | HEART RATE: 80 BPM | SYSTOLIC BLOOD PRESSURE: 148 MMHG | DIASTOLIC BLOOD PRESSURE: 75 MMHG

## 2023-09-23 DIAGNOSIS — R10.9 FLANK PAIN: Primary | ICD-10-CM

## 2023-09-23 DIAGNOSIS — N10 ACUTE PYELONEPHRITIS: ICD-10-CM

## 2023-09-23 LAB
ALBUMIN SERPL BCP-MCNC: 3.2 G/DL (ref 3.5–5.2)
ALP SERPL-CCNC: 81 U/L (ref 55–135)
ALT SERPL W/O P-5'-P-CCNC: 24 U/L (ref 10–44)
ANION GAP SERPL CALC-SCNC: 8 MMOL/L (ref 8–16)
AST SERPL-CCNC: 18 U/L (ref 10–40)
BACTERIA #/AREA URNS AUTO: ABNORMAL /HPF
BASOPHILS # BLD AUTO: 0.05 K/UL (ref 0–0.2)
BASOPHILS NFR BLD: 0.6 % (ref 0–1.9)
BILIRUB SERPL-MCNC: 0.6 MG/DL (ref 0.1–1)
BILIRUB UR QL STRIP: NEGATIVE
BUN SERPL-MCNC: 10 MG/DL (ref 8–23)
CALCIUM SERPL-MCNC: 9.8 MG/DL (ref 8.7–10.5)
CHLORIDE SERPL-SCNC: 105 MMOL/L (ref 95–110)
CLARITY UR REFRACT.AUTO: ABNORMAL
CO2 SERPL-SCNC: 24 MMOL/L (ref 23–29)
COLOR UR AUTO: YELLOW
CREAT SERPL-MCNC: 0.9 MG/DL (ref 0.5–1.4)
DIFFERENTIAL METHOD: ABNORMAL
EOSINOPHIL # BLD AUTO: 0.1 K/UL (ref 0–0.5)
EOSINOPHIL NFR BLD: 1.3 % (ref 0–8)
ERYTHROCYTE [DISTWIDTH] IN BLOOD BY AUTOMATED COUNT: 11.9 % (ref 11.5–14.5)
EST. GFR  (NO RACE VARIABLE): >60 ML/MIN/1.73 M^2
GLUCOSE SERPL-MCNC: 135 MG/DL (ref 70–110)
GLUCOSE UR QL STRIP: NEGATIVE
HCT VFR BLD AUTO: 42.5 % (ref 37–48.5)
HCV AB SERPL QL IA: NORMAL
HGB BLD-MCNC: 14.1 G/DL (ref 12–16)
HGB UR QL STRIP: NEGATIVE
HIV 1+2 AB+HIV1 P24 AG SERPL QL IA: NORMAL
IMM GRANULOCYTES # BLD AUTO: 0.04 K/UL (ref 0–0.04)
IMM GRANULOCYTES NFR BLD AUTO: 0.5 % (ref 0–0.5)
KETONES UR QL STRIP: NEGATIVE
LEUKOCYTE ESTERASE UR QL STRIP: ABNORMAL
LYMPHOCYTES # BLD AUTO: 2.6 K/UL (ref 1–4.8)
LYMPHOCYTES NFR BLD: 30.8 % (ref 18–48)
MCH RBC QN AUTO: 30.9 PG (ref 27–31)
MCHC RBC AUTO-ENTMCNC: 33.2 G/DL (ref 32–36)
MCV RBC AUTO: 93 FL (ref 82–98)
MICROSCOPIC COMMENT: ABNORMAL
MONOCYTES # BLD AUTO: 0.5 K/UL (ref 0.3–1)
MONOCYTES NFR BLD: 6.4 % (ref 4–15)
NEUTROPHILS # BLD AUTO: 5 K/UL (ref 1.8–7.7)
NEUTROPHILS NFR BLD: 60.4 % (ref 38–73)
NITRITE UR QL STRIP: NEGATIVE
NRBC BLD-RTO: 0 /100 WBC
PH UR STRIP: 6 [PH] (ref 5–8)
PLATELET # BLD AUTO: 324 K/UL (ref 150–450)
PMV BLD AUTO: 8.8 FL (ref 9.2–12.9)
POTASSIUM SERPL-SCNC: 3.7 MMOL/L (ref 3.5–5.1)
PROT SERPL-MCNC: 7.3 G/DL (ref 6–8.4)
PROT UR QL STRIP: NEGATIVE
RBC # BLD AUTO: 4.57 M/UL (ref 4–5.4)
RBC #/AREA URNS AUTO: 2 /HPF (ref 0–4)
SODIUM SERPL-SCNC: 137 MMOL/L (ref 136–145)
SP GR UR STRIP: 1.02 (ref 1–1.03)
SQUAMOUS #/AREA URNS AUTO: 18 /HPF
URN SPEC COLLECT METH UR: ABNORMAL
WBC # BLD AUTO: 8.28 K/UL (ref 3.9–12.7)
WBC #/AREA URNS AUTO: 7 /HPF (ref 0–5)

## 2023-09-23 PROCEDURE — 96374 THER/PROPH/DIAG INJ IV PUSH: CPT

## 2023-09-23 PROCEDURE — 81001 URINALYSIS AUTO W/SCOPE: CPT

## 2023-09-23 PROCEDURE — 87389 HIV-1 AG W/HIV-1&-2 AB AG IA: CPT | Performed by: PHYSICIAN ASSISTANT

## 2023-09-23 PROCEDURE — 99285 EMERGENCY DEPT VISIT HI MDM: CPT | Mod: 25

## 2023-09-23 PROCEDURE — 63600175 PHARM REV CODE 636 W HCPCS

## 2023-09-23 PROCEDURE — 85025 COMPLETE CBC W/AUTO DIFF WBC: CPT

## 2023-09-23 PROCEDURE — 86803 HEPATITIS C AB TEST: CPT | Performed by: PHYSICIAN ASSISTANT

## 2023-09-23 PROCEDURE — 25000003 PHARM REV CODE 250

## 2023-09-23 PROCEDURE — 80053 COMPREHEN METABOLIC PANEL: CPT

## 2023-09-23 RX ORDER — KETOROLAC TROMETHAMINE 30 MG/ML
10 INJECTION, SOLUTION INTRAMUSCULAR; INTRAVENOUS
Status: COMPLETED | OUTPATIENT
Start: 2023-09-23 | End: 2023-09-23

## 2023-09-23 RX ORDER — CEFPODOXIME PROXETIL 200 MG/1
200 TABLET, FILM COATED ORAL 2 TIMES DAILY
Qty: 20 TABLET | Refills: 0 | Status: SHIPPED | OUTPATIENT
Start: 2023-09-23 | End: 2023-10-03

## 2023-09-23 RX ORDER — LIDOCAINE 50 MG/G
1 PATCH TOPICAL
Status: DISCONTINUED | OUTPATIENT
Start: 2023-09-23 | End: 2023-09-23 | Stop reason: HOSPADM

## 2023-09-23 RX ADMIN — LIDOCAINE 1 PATCH: 50 PATCH CUTANEOUS at 11:09

## 2023-09-23 RX ADMIN — KETOROLAC TROMETHAMINE 10 MG: 30 INJECTION, SOLUTION INTRAMUSCULAR; INTRAVENOUS at 11:09

## 2023-09-23 NOTE — ED PROVIDER NOTES
Encounter Date: 2023       History     Chief Complaint   Patient presents with    Flank Pain     Left sided flank pain x 3 days, denies N/V and urinary complaints      63-year-old female history of diabetes mellitus and hypertension presents emergency department due to 3 days of left flank pain.  Patient reports constant and presentation described as dull and achy nonradiating.  She was noticed her urine has become darker in color.  Patient reports prior to this she is been suffering with a GI bug with excessive diarrhea however this has since improved.  She was not endorsing fevers nausea or vomiting.  No abdominal pain.        Review of patient's allergies indicates:  No Known Allergies  Past Medical History:   Diagnosis Date    Atherosclerotic peripheral vascular disease     Carotid stenosis     Coronary atherosclerosis     RCA-    Diabetes mellitus, type 2     Diverticulitis large intestine 2014    Hypercholesterolemia     Hypertension     Trochanteric bursitis of left hip 2023     Past Surgical History:   Procedure Laterality Date    ILIAC ARTERY STENT Left 2010    Maria T 8 x 29 mm    ILIAC ARTERY STENT Right 2010    Maria T 8 x 60 mm    TUBAL LIGATION      TYMPANOTOMY  2018     Family History   Problem Relation Age of Onset    Coronary artery disease Mother     Coronary artery disease Father     Diabetes Father     Hypertension Sister     Hypertension Brother      Social History     Tobacco Use    Smoking status: Every Day     Current packs/day: 0.00     Types: Cigarettes     Last attempt to quit: 2021     Years since quittin.8    Smokeless tobacco: Never   Substance Use Topics    Alcohol use: Yes    Drug use: Never     Review of Systems  See HPI  Physical Exam     Initial Vitals [23 1032]   BP Pulse Resp Temp SpO2   123/60 90 18 98.6 °F (37 °C) 98 %      MAP       --         Physical Exam    Constitutional: She appears well-developed and well-nourished.   HENT:    Head: Normocephalic and atraumatic.   Eyes: Conjunctivae and EOM are normal.   Neck:   Normal range of motion.  Cardiovascular:  Normal rate and regular rhythm.           Pulmonary/Chest: Breath sounds normal. No respiratory distress. She has no wheezes. She has no rales.   Abdominal: Abdomen is soft. She exhibits no distension. There is no abdominal tenderness.   No CVA tenderness. There is no rebound.   Musculoskeletal:         General: No tenderness. Normal range of motion.      Cervical back: Normal range of motion.     Neurological: She is alert and oriented to person, place, and time. She has normal strength.   Skin: Skin is warm and dry.   Psychiatric: She has a normal mood and affect. Thought content normal.         ED Course   Procedures  Labs Reviewed   CBC W/ AUTO DIFFERENTIAL - Abnormal; Notable for the following components:       Result Value    MPV 8.8 (*)     All other components within normal limits   COMPREHENSIVE METABOLIC PANEL - Abnormal; Notable for the following components:    Glucose 135 (*)     Albumin 3.2 (*)     All other components within normal limits   URINALYSIS, REFLEX TO URINE CULTURE - Abnormal; Notable for the following components:    Appearance, UA Hazy (*)     Leukocytes, UA 1+ (*)     All other components within normal limits    Narrative:     Specimen Source->Urine   URINALYSIS MICROSCOPIC - Abnormal; Notable for the following components:    WBC, UA 7 (*)     Bacteria Few (*)     All other components within normal limits    Narrative:     Specimen Source->Urine   HIV 1 / 2 ANTIBODY    Narrative:     Release to patient->Immediate   HEPATITIS C ANTIBODY    Narrative:     Release to patient->Immediate          Imaging Results              CT Renal Stone Study ABD Pelvis WO (Final result)  Result time 09/23/23 13:32:53      Final result by Jesus Villa MD (09/23/23 13:32:53)                   Impression:      1. No evidence of radiopaque urolithiasis or obstructive  uropathy, as questioned.  2. Haziness at the mesenteric root with prominent number mesenteric lymph nodes, a nonspecific finding which may be reactive to infectious or noninfectious inflammatory gastrointestinal process or indicative of sclerosing mesenteritis.  However, several additional mesenteric and retroperitoneal lymph nodes are noted, including dominant gastrohepatic ligament node/kaelyn conglomerate measuring on the order of 16 mm short axis, the latter which appears relatively similar to lumbar spine CT 03/13/2022.  However, the possibility of a more aggressive process such as metastatic disease or lymphoma is difficult to exclude.  Recommend clinical correlation with comparison to any recent outside imaging, available.  3. Additionally, there is haziness in the peripancreatic region, which could be related to the above mesenteric process, however the possibility of acute pancreatitis could appear similar.  Recommend clinical laboratory correlation in this regard.  4. Additional details, as provided in the body of report.      Electronically signed by: Jesus Villa  Date:    09/23/2023  Time:    13:32               Narrative:    EXAMINATION:  CT RENAL STONE STUDY ABD PELVIS WO    CLINICAL HISTORY:  Flank pain, kidney stone suspected;    TECHNIQUE:  Low dose axial images, sagittal and coronal reformations were obtained from the lung bases to the pubic symphysis.  Contrast was not administered.    COMPARISON:  None    FINDINGS:  Evaluation of the solid organs is limited due to lack of intravenous contrast.    Lower chest: Atelectasis at the lung bases.  Cardiac valvular and coronary artery calcifications.    URINARY COLLECTING SYSTEM: No calculi in the kidneys, ureters, or urinary bladder. No hydronephrosis or ureterectasis.  Simple cyst midpole right kidney.    Liver: Normal contour.  Borderline hepatic steatosis.    Gallbladder and bile ducts: Unremarkable.    Pancreas: Mild pancreatic inflammation.   Homogeneous unenhanced appearance of the pancreatic parenchyma.    Spleen: Normal contour.    Adrenals: Normal contour.    Lymph nodes: Haziness is noted at the mesenteric root with prominent in number mesenteric lymph nodes.  Additional prominent soft tissue, possibly lymph node is noted in the gastrohepatic ligament region measuring on the order of 16 mm short axis (axial series 2, image 45).    Bowel and mesentery: Small hiatal hernia.  No definite evidence of bowel obstruction.  Colonic diverticulosis.  Moderate to large volume colonic stool.  Normal appendix.    Abdominal aorta: Heavy atherosclerosis of nonaneurysmal aorta.  Bilateral common iliac artery stents.    Inferior vena cava: Unremarkable.    Free fluid or free air: None.    Pelvis: Unremarkable.    Body wall: Small fat and vessel containing umbilical hernia.    Bones: No definite acute abnormality.  Degenerative findings of the spine, sacroiliac joints, pubic symphysis, and hip joints.                                       Medications   LIDOcaine 5 % patch 1 patch (1 patch Transdermal Patch Applied 9/23/23 1142)   ketorolac injection 9.999 mg (9.999 mg Intravenous Given 9/23/23 1141)     Medical Decision Making  This is a 63-year-old female presents to the emergency department with left flank pain.   Patient UA shows white blood cells however it is contaminated as well.  CT study was negative for stones.  Patient's symptoms emergency department well controlled.  She was generally comfortable appearing.  Symptoms could also be related to muscular spasm however patient denies any strenuous activity recently.  We will treat patient with antibiotic for pyelonephritis.  Return precautions discussed discharged home  Pt discussed with supervising physician      Amount and/or Complexity of Data Reviewed  Labs: ordered.  Radiology: ordered.    Risk  Prescription drug management.                               Clinical Impression:   Final diagnoses:  [R10.9]  Flank pain (Primary)  [N10] Acute pyelonephritis        ED Disposition Condition    Discharge Stable          ED Prescriptions       Medication Sig Dispense Start Date End Date Auth. Provider    cefpodoxime (VANTIN) 200 MG tablet Take 1 tablet (200 mg total) by mouth 2 (two) times daily. for 10 days 20 tablet 9/23/2023 10/3/2023 Angeles Laws PA-C          Follow-up Information       Follow up With Specialties Details Why Contact Info    Kimmy Kraus MD Family Medicine  As needed [address] [place] [id number]  [phone]               Angeles Laws PA-C  09/23/23 4615

## 2023-09-23 NOTE — DISCHARGE INSTRUCTIONS
As discussed CT did not reveal kidney stones.  I did however no urine had bacteria in it although specimen is contaminated will begin you on antibiotics.

## 2023-09-23 NOTE — ED NOTES
Patient identifiers verified and correct for Lina Farmer   LOC: The patient is awake, alert and aware of environment with an appropriate affect, the patient is oriented x 3 and speaking appropriately.   APPEARANCE: Patient appears comfortable and in no acute distress, patient is clean and well groomed.  SKIN: The skin is warm and dry, color consistent with ethnicity, patient has normal skin turgor and moist mucus membranes, skin intact, no breakdown or bruising noted.   MUSCULOSKELETAL: Patient moving all extremities spontaneously, no swelling noted.  RESPIRATORY: Airway is open and patent, respirations are spontaneous, patient has a normal effort and rate, no accessory muscle use noted, O2 sats noted at 98% on room air.  CARDIAC: Patient has a normal rate and regular rhythm, no edema noted, capillary refill < 3 seconds.   GASTRO: Soft and tender to palpation, no distention noted, normoactive bowel sounds present in all four quadrants. Pt states bowel movements have been regular. Pt c/o left flank pain  : Pt denies any pain or frequency with urination.  NEURO: Pt opens eyes spontaneously, behavior appropriate to situation, follows commands, facial expression symmetrical, bilateral hand grasp equal and even, purposeful motor response noted, normal sensation in all extremities when touched with a finger.

## 2023-09-23 NOTE — ED TRIAGE NOTES
Pt arriving to ED c/o Left sided flank pain x 2 days, denies N/V and urinary complaints. States had stomach virus a week ago.

## 2023-10-02 DIAGNOSIS — E78.00 HYPERCHOLESTEROLEMIA: ICD-10-CM

## 2023-10-02 RX ORDER — EVOLOCUMAB 140 MG/ML
140 INJECTION, SOLUTION SUBCUTANEOUS
Qty: 2 ML | Refills: 11 | Status: ACTIVE | OUTPATIENT
Start: 2023-10-02 | End: 2024-09-30

## 2023-10-20 ENCOUNTER — OFFICE VISIT (OUTPATIENT)
Dept: CARDIOLOGY | Facility: CLINIC | Age: 63
End: 2023-10-20
Payer: MEDICARE

## 2023-10-20 VITALS
WEIGHT: 158.75 LBS | HEART RATE: 75 BPM | BODY MASS INDEX: 28.12 KG/M2 | SYSTOLIC BLOOD PRESSURE: 120 MMHG | DIASTOLIC BLOOD PRESSURE: 62 MMHG | OXYGEN SATURATION: 99 %

## 2023-10-20 DIAGNOSIS — I65.22 STENOSIS OF LEFT CAROTID ARTERY: ICD-10-CM

## 2023-10-20 DIAGNOSIS — I25.118 ATHEROSCLEROSIS OF NATIVE CORONARY ARTERY OF NATIVE HEART WITH STABLE ANGINA PECTORIS: Primary | ICD-10-CM

## 2023-10-20 DIAGNOSIS — E78.00 HYPERCHOLESTEROLEMIA: ICD-10-CM

## 2023-10-20 DIAGNOSIS — I10 PRIMARY HYPERTENSION: ICD-10-CM

## 2023-10-20 PROCEDURE — 3074F SYST BP LT 130 MM HG: CPT | Mod: CPTII,S$GLB,, | Performed by: INTERNAL MEDICINE

## 2023-10-20 PROCEDURE — 1159F MED LIST DOCD IN RCRD: CPT | Mod: CPTII,S$GLB,, | Performed by: INTERNAL MEDICINE

## 2023-10-20 PROCEDURE — 1101F PR PT FALLS ASSESS DOC 0-1 FALLS W/OUT INJ PAST YR: ICD-10-PCS | Mod: CPTII,S$GLB,, | Performed by: INTERNAL MEDICINE

## 2023-10-20 PROCEDURE — 99999 PR PBB SHADOW E&M-EST. PATIENT-LVL III: CPT | Mod: PBBFAC,,, | Performed by: INTERNAL MEDICINE

## 2023-10-20 PROCEDURE — 99214 OFFICE O/P EST MOD 30 MIN: CPT | Mod: S$GLB,,, | Performed by: INTERNAL MEDICINE

## 2023-10-20 PROCEDURE — 1160F RVW MEDS BY RX/DR IN RCRD: CPT | Mod: CPTII,S$GLB,, | Performed by: INTERNAL MEDICINE

## 2023-10-20 PROCEDURE — 99999 PR PBB SHADOW E&M-EST. PATIENT-LVL III: ICD-10-PCS | Mod: PBBFAC,,, | Performed by: INTERNAL MEDICINE

## 2023-10-20 PROCEDURE — 1159F PR MEDICATION LIST DOCUMENTED IN MEDICAL RECORD: ICD-10-PCS | Mod: CPTII,S$GLB,, | Performed by: INTERNAL MEDICINE

## 2023-10-20 PROCEDURE — 3078F DIAST BP <80 MM HG: CPT | Mod: CPTII,S$GLB,, | Performed by: INTERNAL MEDICINE

## 2023-10-20 PROCEDURE — 1160F PR REVIEW ALL MEDS BY PRESCRIBER/CLIN PHARMACIST DOCUMENTED: ICD-10-PCS | Mod: CPTII,S$GLB,, | Performed by: INTERNAL MEDICINE

## 2023-10-20 PROCEDURE — 3078F PR MOST RECENT DIASTOLIC BLOOD PRESSURE < 80 MM HG: ICD-10-PCS | Mod: CPTII,S$GLB,, | Performed by: INTERNAL MEDICINE

## 2023-10-20 PROCEDURE — 3074F PR MOST RECENT SYSTOLIC BLOOD PRESSURE < 130 MM HG: ICD-10-PCS | Mod: CPTII,S$GLB,, | Performed by: INTERNAL MEDICINE

## 2023-10-20 PROCEDURE — 99214 PR OFFICE/OUTPT VISIT, EST, LEVL IV, 30-39 MIN: ICD-10-PCS | Mod: S$GLB,,, | Performed by: INTERNAL MEDICINE

## 2023-10-20 PROCEDURE — 3288F PR FALLS RISK ASSESSMENT DOCUMENTED: ICD-10-PCS | Mod: CPTII,S$GLB,, | Performed by: INTERNAL MEDICINE

## 2023-10-20 PROCEDURE — 3008F BODY MASS INDEX DOCD: CPT | Mod: CPTII,S$GLB,, | Performed by: INTERNAL MEDICINE

## 2023-10-20 PROCEDURE — 3008F PR BODY MASS INDEX (BMI) DOCUMENTED: ICD-10-PCS | Mod: CPTII,S$GLB,, | Performed by: INTERNAL MEDICINE

## 2023-10-20 PROCEDURE — 3288F FALL RISK ASSESSMENT DOCD: CPT | Mod: CPTII,S$GLB,, | Performed by: INTERNAL MEDICINE

## 2023-10-20 PROCEDURE — 1101F PT FALLS ASSESS-DOCD LE1/YR: CPT | Mod: CPTII,S$GLB,, | Performed by: INTERNAL MEDICINE

## 2023-10-20 NOTE — PROGRESS NOTES
[facility]    Chief Complaint  Chief Complaint   Patient presents with    Coronary Artery Disease       HPI:    Her primary limitation is her back.  Considering further surgeries.  May be getting her cataracts done.  Discussed that she is a good candidate for cataract surgery from a cardiac standpoint.  Still smoking.    Medications  Current Outpatient Medications   Medication Sig Dispense Refill    aspirin (ECOTRIN) 81 MG EC tablet Take 81 mg by mouth once daily.      benzonatate (TESSALON) 100 MG capsule benzonatate 100 mg capsule      cyclobenzaprine (FLEXERIL) 10 MG tablet cyclobenzaprine 10 mg tablet   TAKE 1 TABLET BY MOUTH TWICE DAILY AS NEEDED FOR MUSCLE SPASM      diazePAM (VALIUM) 10 MG Tab diazepam 10 mg tablet   TAKE 1 TO 2 TABLET BY MOUTH 1 HOUR PRIOR TO APPOINTMENT      evolocumab (REPATHA SURECLICK) 140 mg/mL PnIj Inject 1 mL (140 mg total) into the skin every 14 (fourteen) days. 2 mL 11    evolocumab (REPATHA SYRINGE) 140 mg/mL Syrg Inject 140 mg into the skin.      HYDROcodone-acetaminophen (NORCO) 5-325 mg per tablet hydrocodone 5 mg-acetaminophen 325 mg tablet   TAKE 1 TABLET BY MOUTH EVERY 8 HOURS AS NEEDED AS NEEDED FOR PAIN      hydrOXYzine pamoate (VISTARIL) 25 MG Cap hydroxyzine pamoate 25 mg capsule      levocetirizine (XYZAL) 5 MG tablet levocetirizine 5 mg tablet   TAKE 1 TABLET BY MOUTH EVERY EVENING      losartan-hydrochlorothiazide 100-12.5 mg (HYZAAR) 100-12.5 mg Tab Take 1 tablet by mouth once daily.      methIMAzole (TAPAZOLE) 5 MG Tab methimazole 5 mg tablet   TAKE 1 TABLET BY MOUTH TWICE DAILY      naproxen (NAPROSYN) 500 MG tablet Take 1 tablet (500 mg total) by mouth 2 (two) times daily with meals. 60 tablet 0    oxyCODONE-acetaminophen (PERCOCET)  mg per tablet daily as needed.      zolpidem (AMBIEN) 10 mg Tab Take 10 mg by mouth once daily.       No current facility-administered medications for this visit.        History  Past Medical History:   Diagnosis  Date    Atherosclerotic peripheral vascular disease     Carotid stenosis     Coronary atherosclerosis     RCA-    Diabetes mellitus, type 2     Diverticulitis large intestine 2014    Hypercholesterolemia     Hypertension     Trochanteric bursitis of left hip 2023     Past Surgical History:   Procedure Laterality Date    ILIAC ARTERY STENT Left 2010    Maria T 8 x 29 mm    ILIAC ARTERY STENT Right 2010    Maria T 8 x 60 mm    TUBAL LIGATION      TYMPANOTOMY  2018     Social History     Socioeconomic History    Marital status:    Tobacco Use    Smoking status: Every Day     Current packs/day: 0.00     Types: Cigarettes     Last attempt to quit: 2021     Years since quittin.9    Smokeless tobacco: Never   Substance and Sexual Activity    Alcohol use: Yes    Drug use: Never    Sexual activity: Not Currently     Family History   Problem Relation Age of Onset    Coronary artery disease Mother     Coronary artery disease Father     Diabetes Father     Hypertension Sister     Hypertension Brother         Allergies  Review of patient's allergies indicates:  No Known Allergies    Review of Systems   Review of Systems   Constitutional: Negative for malaise/fatigue, weight gain and weight loss.   Eyes:  Negative for visual disturbance.   Cardiovascular:  Negative for chest pain, claudication, cyanosis, dyspnea on exertion, irregular heartbeat, leg swelling, near-syncope, orthopnea, palpitations, paroxysmal nocturnal dyspnea and syncope.   Respiratory:  Negative for cough, hemoptysis, shortness of breath, sleep disturbances due to breathing and wheezing.    Hematologic/Lymphatic: Negative for bleeding problem. Does not bruise/bleed easily.   Skin:  Negative for poor wound healing.   Musculoskeletal:  Positive for back pain. Negative for muscle cramps and myalgias.   Gastrointestinal:  Negative for abdominal pain, anorexia, diarrhea, heartburn, hematemesis, hematochezia, melena, nausea and  vomiting.   Genitourinary:  Negative for hematuria and nocturia.   Neurological:  Negative for excessive daytime sleepiness, dizziness, focal weakness, light-headedness and weakness.       Physical Exam  Vitals:    10/20/23 1200   BP: 120/62   Pulse: 75     Wt Readings from Last 1 Encounters:   10/20/23 72 kg (158 lb 11.7 oz)     Physical Exam  Constitutional:       General: She is not in acute distress.     Appearance: She is not toxic-appearing or diaphoretic.   HENT:      Head: Normocephalic and atraumatic.   Eyes:      General: No scleral icterus.     Conjunctiva/sclera: Conjunctivae normal.   Neck:      Thyroid: No thyromegaly.      Vascular: No hepatojugular reflux or JVD.      Trachea: No tracheal deviation.   Cardiovascular:      Rate and Rhythm: Normal rate and regular rhythm. No extrasystoles are present.     Chest Wall: PMI is not displaced.      Pulses:           Carotid pulses are 2+ on the right side and 2+ on the left side with bruit.       Radial pulses are 2+ on the right side and 2+ on the left side.        Dorsalis pedis pulses are 1+ on the right side and 1+ on the left side.        Posterior tibial pulses are 1+ on the right side and 1+ on the left side.      Heart sounds: S1 normal and S2 normal. No murmur heard.     Gallop present. S4 sounds present. No S3 sounds.   Pulmonary:      Effort: No accessory muscle usage or respiratory distress.      Breath sounds: No decreased breath sounds, wheezing, rhonchi or rales.   Abdominal:      General: Bowel sounds are normal. There is abdominal bruit.      Palpations: Abdomen is soft. There is no hepatomegaly, splenomegaly or pulsatile mass.      Tenderness: There is no abdominal tenderness.   Musculoskeletal:         General: No tenderness or deformity.      Cervical back: Neck supple.   Skin:     General: Skin is warm and dry.      Coloration: Skin is not pale.      Nails: There is no clubbing.   Neurological:      Mental Status: She is alert and  oriented to person, place, and time.      Cranial Nerves: No cranial nerve deficit.      Sensory: No sensory deficit.   Psychiatric:         Speech: Speech normal.         Behavior: Behavior normal. Behavior is cooperative.         Labs  Admission on 09/23/2023, Discharged on 09/23/2023   Component Date Value Ref Range Status    HIV 1/2 Ag/Ab 09/23/2023 Non-reactive  Non-reactive Final    Hepatitis C Ab 09/23/2023 Non-reactive  Non-reactive Final    WBC 09/23/2023 8.28  3.90 - 12.70 K/uL Final    RBC 09/23/2023 4.57  4.00 - 5.40 M/uL Final    Hemoglobin 09/23/2023 14.1  12.0 - 16.0 g/dL Final    Hematocrit 09/23/2023 42.5  37.0 - 48.5 % Final    MCV 09/23/2023 93  82 - 98 fL Final    MCH 09/23/2023 30.9  27.0 - 31.0 pg Final    MCHC 09/23/2023 33.2  32.0 - 36.0 g/dL Final    RDW 09/23/2023 11.9  11.5 - 14.5 % Final    Platelets 09/23/2023 324  150 - 450 K/uL Final    MPV 09/23/2023 8.8 (L)  9.2 - 12.9 fL Final    Immature Granulocytes 09/23/2023 0.5  0.0 - 0.5 % Final    Gran # (ANC) 09/23/2023 5.0  1.8 - 7.7 K/uL Final    Immature Grans (Abs) 09/23/2023 0.04  0.00 - 0.04 K/uL Final    Comment: Mild elevation in immature granulocytes is non specific and   can be seen in a variety of conditions including stress response,   acute inflammation, trauma and pregnancy. Correlation with other   laboratory and clinical findings is essential.      Lymph # 09/23/2023 2.6  1.0 - 4.8 K/uL Final    Mono # 09/23/2023 0.5  0.3 - 1.0 K/uL Final    Eos # 09/23/2023 0.1  0.0 - 0.5 K/uL Final    Baso # 09/23/2023 0.05  0.00 - 0.20 K/uL Final    nRBC 09/23/2023 0  0 /100 WBC Final    Gran % 09/23/2023 60.4  38.0 - 73.0 % Final    Lymph % 09/23/2023 30.8  18.0 - 48.0 % Final    Mono % 09/23/2023 6.4  4.0 - 15.0 % Final    Eosinophil % 09/23/2023 1.3  0.0 - 8.0 % Final    Basophil % 09/23/2023 0.6  0.0 - 1.9 % Final    Differential Method 09/23/2023 Automated   Final    Sodium 09/23/2023 137  136 - 145 mmol/L Final    Potassium  09/23/2023 3.7  3.5 - 5.1 mmol/L Final    Chloride 09/23/2023 105  95 - 110 mmol/L Final    CO2 09/23/2023 24  23 - 29 mmol/L Final    Glucose 09/23/2023 135 (H)  70 - 110 mg/dL Final    BUN 09/23/2023 10  8 - 23 mg/dL Final    Creatinine 09/23/2023 0.9  0.5 - 1.4 mg/dL Final    Calcium 09/23/2023 9.8  8.7 - 10.5 mg/dL Final    Total Protein 09/23/2023 7.3  6.0 - 8.4 g/dL Final    Albumin 09/23/2023 3.2 (L)  3.5 - 5.2 g/dL Final    Total Bilirubin 09/23/2023 0.6  0.1 - 1.0 mg/dL Final    Comment: For infants and newborns, interpretation of results should be based  on gestational age, weight and in agreement with clinical  observations.    Premature Infant recommended reference ranges:  Up to 24 hours.............<8.0 mg/dL  Up to 48 hours............<12.0 mg/dL  3-5 days..................<15.0 mg/dL  6-29 days.................<15.0 mg/dL      Alkaline Phosphatase 09/23/2023 81  55 - 135 U/L Final    AST 09/23/2023 18  10 - 40 U/L Final    ALT 09/23/2023 24  10 - 44 U/L Final    eGFR 09/23/2023 >60.0  >60 mL/min/1.73 m^2 Final    Anion Gap 09/23/2023 8  8 - 16 mmol/L Final    Specimen UA 09/23/2023 Urine, Clean Catch   Final    Color, UA 09/23/2023 Yellow  Yellow, Straw, Diana Final    Appearance, UA 09/23/2023 Hazy (A)  Clear Final    pH, UA 09/23/2023 6.0  5.0 - 8.0 Final    Specific Gravity, UA 09/23/2023 1.020  1.005 - 1.030 Final    Protein, UA 09/23/2023 Negative  Negative Final    Comment: Recommend a 24 hour urine protein or a urine   protein/creatinine ratio if globulin induced proteinuria is  clinically suspected.      Glucose, UA 09/23/2023 Negative  Negative Final    Ketones, UA 09/23/2023 Negative  Negative Final    Bilirubin (UA) 09/23/2023 Negative  Negative Final    Occult Blood UA 09/23/2023 Negative  Negative Final    Nitrite, UA 09/23/2023 Negative  Negative Final    Leukocytes, UA 09/23/2023 1+ (A)  Negative Final    RBC, UA 09/23/2023 2  0 - 4 /hpf Final    WBC, UA 09/23/2023 7 (H)  0 - 5 /hpf  Final    Bacteria 09/23/2023 Few (A)  None-Occ /hpf Final    Squam Epithel, UA 09/23/2023 18  /hpf Final    Microscopic Comment 09/23/2023 SEE COMMENT   Final    Comment: Other formed elements not mentioned in the report are not   present in the microscopic examination.      Admission on 05/13/2023, Discharged on 05/13/2023   Component Date Value Ref Range Status    WBC 05/13/2023 10.48  3.90 - 12.70 K/uL Final    RBC 05/13/2023 4.93  4.00 - 5.40 M/uL Final    Hemoglobin 05/13/2023 14.5  12.0 - 16.0 g/dL Final    Hematocrit 05/13/2023 44.3  37.0 - 48.5 % Final    MCV 05/13/2023 90  82 - 98 fL Final    MCH 05/13/2023 29.4  27.0 - 31.0 pg Final    MCHC 05/13/2023 32.7  32.0 - 36.0 g/dL Final    RDW 05/13/2023 12.0  11.5 - 14.5 % Final    Platelets 05/13/2023 271  150 - 450 K/uL Final    MPV 05/13/2023 8.7 (L)  9.2 - 12.9 fL Final    Immature Granulocytes 05/13/2023 0.3  0.0 - 0.5 % Final    Gran # (ANC) 05/13/2023 6.0  1.8 - 7.7 K/uL Final    Immature Grans (Abs) 05/13/2023 0.03  0.00 - 0.04 K/uL Final    Comment: Mild elevation in immature granulocytes is non specific and   can be seen in a variety of conditions including stress response,   acute inflammation, trauma and pregnancy. Correlation with other   laboratory and clinical findings is essential.      Lymph # 05/13/2023 3.4  1.0 - 4.8 K/uL Final    Mono # 05/13/2023 0.9  0.3 - 1.0 K/uL Final    Eos # 05/13/2023 0.1  0.0 - 0.5 K/uL Final    Baso # 05/13/2023 0.05  0.00 - 0.20 K/uL Final    nRBC 05/13/2023 0  0 /100 WBC Final    Gran % 05/13/2023 57.4  38.0 - 73.0 % Final    Lymph % 05/13/2023 32.0  18.0 - 48.0 % Final    Mono % 05/13/2023 8.5  4.0 - 15.0 % Final    Eosinophil % 05/13/2023 1.3  0.0 - 8.0 % Final    Basophil % 05/13/2023 0.5  0.0 - 1.9 % Final    Differential Method 05/13/2023 Automated   Final    Sed Rate 05/13/2023 5  0 - 36 mm/Hr Final    Comment: [place] [facility] [place] [facility]:  Normal range:  Male   0-10 mm/Hr  Female 0-20 mm/Hr       CRP 05/13/2023 1.9  0.0 - 8.2 mg/L Final    Sodium 05/13/2023 137  136 - 145 mmol/L Final    Potassium 05/13/2023 4.7  3.5 - 5.1 mmol/L Final    Chloride 05/13/2023 102  95 - 110 mmol/L Final    CO2 05/13/2023 26  23 - 29 mmol/L Final    Glucose 05/13/2023 92  70 - 110 mg/dL Final    BUN 05/13/2023 18  8 - 23 mg/dL Final    Creatinine 05/13/2023 1.1  0.5 - 1.4 mg/dL Final    Calcium 05/13/2023 10.3  8.7 - 10.5 mg/dL Final    Total Protein 05/13/2023 6.9  6.0 - 8.4 g/dL Final    Albumin 05/13/2023 3.7  3.5 - 5.2 g/dL Final    Total Bilirubin 05/13/2023 0.5  0.1 - 1.0 mg/dL Final    Comment: For infants and newborns, interpretation of results should be based  on gestational age, weight and in agreement with clinical  observations.    Premature Infant recommended reference ranges:  Up to 24 hours.............<8.0 mg/dL  Up to 48 hours............<12.0 mg/dL  3-5 days..................<15.0 mg/dL  6-29 days.................<15.0 mg/dL      Alkaline Phosphatase 05/13/2023 82  55 - 135 U/L Final    AST 05/13/2023 15  10 - 40 U/L Final    ALT 05/13/2023 14  10 - 44 U/L Final    Anion Gap 05/13/2023 9  8 - 16 mmol/L Final    eGFR 05/13/2023 55.8 (A)  >60 mL/min/1.73 m^2 Final   Admission on 05/09/2023, Discharged on 05/09/2023   Component Date Value Ref Range Status    WBC 05/09/2023 9.20  3.90 - 12.70 K/uL Final    RBC 05/09/2023 4.48  4.00 - 5.40 M/uL Final    Hemoglobin 05/09/2023 13.8  12.0 - 16.0 g/dL Final    Hematocrit 05/09/2023 39.8  37.0 - 48.5 % Final    MCV 05/09/2023 89  82 - 98 fL Final    MCH 05/09/2023 30.8  27.0 - 31.0 pg Final    MCHC 05/09/2023 34.7  32.0 - 36.0 g/dL Final    RDW 05/09/2023 12.4  11.5 - 14.5 % Final    Platelets 05/09/2023 235  150 - 450 K/uL Final    MPV 05/09/2023 8.9 (L)  9.2 - 12.9 fL Final    Immature Granulocytes 05/09/2023 0.3  0.0 - 0.5 % Final    Gran # (ANC) 05/09/2023 4.9  1.8 - 7.7 K/uL Final    Immature Grans (Abs) 05/09/2023 0.03  0.00 - 0.04 K/uL Final    Comment: Mild  elevation in immature granulocytes is non specific and   can be seen in a variety of conditions including stress response,   acute inflammation, trauma and pregnancy. Correlation with other   laboratory and clinical findings is essential.      Lymph # 05/09/2023 3.4  1.0 - 4.8 K/uL Final    Mono # 05/09/2023 0.7  0.3 - 1.0 K/uL Final    Eos # 05/09/2023 0.2  0.0 - 0.5 K/uL Final    Baso # 05/09/2023 0.05  0.00 - 0.20 K/uL Final    nRBC 05/09/2023 0  0 /100 WBC Final    Gran % 05/09/2023 53.8  38.0 - 73.0 % Final    Lymph % 05/09/2023 36.4  18.0 - 48.0 % Final    Mono % 05/09/2023 7.4  4.0 - 15.0 % Final    Eosinophil % 05/09/2023 1.6  0.0 - 8.0 % Final    Basophil % 05/09/2023 0.5  0.0 - 1.9 % Final    Differential Method 05/09/2023 Automated   Final    Sodium 05/09/2023 135 (L)  136 - 145 mmol/L Final    Potassium 05/09/2023 4.3  3.5 - 5.1 mmol/L Final    Chloride 05/09/2023 104  95 - 110 mmol/L Final    CO2 05/09/2023 25  23 - 29 mmol/L Final    Glucose 05/09/2023 105  70 - 110 mg/dL Final    BUN 05/09/2023 19  8 - 23 mg/dL Final    Creatinine 05/09/2023 1.0  0.5 - 1.4 mg/dL Final    Calcium 05/09/2023 9.4  8.7 - 10.5 mg/dL Final    Total Protein 05/09/2023 6.7  6.0 - 8.4 g/dL Final    Albumin 05/09/2023 3.6  3.5 - 5.2 g/dL Final    Total Bilirubin 05/09/2023 0.2  0.1 - 1.0 mg/dL Final    Comment: For infants and newborns, interpretation of results should be based  on gestational age, weight and in agreement with clinical  observations.    Premature Infant recommended reference ranges:  Up to 24 hours.............<8.0 mg/dL  Up to 48 hours............<12.0 mg/dL  3-5 days..................<15.0 mg/dL  6-29 days.................<15.0 mg/dL      Alkaline Phosphatase 05/09/2023 85  55 - 135 U/L Final    AST 05/09/2023 13  10 - 40 U/L Final    ALT 05/09/2023 11  10 - 44 U/L Final    Anion Gap 05/09/2023 6 (L)  8 - 16 mmol/L Final    eGFR 05/09/2023 >60.0  >60 mL/min/1.73 m^2 Final    Sed Rate 05/09/2023 9  0 - 36  mm/Hr Final    CRP 05/09/2023 1.8  0.0 - 8.2 mg/L Final       Imaging  MRI Knee Without Contrast Left    Result Date: 10/2/2023  MRI left knee CPT: 84893 Indication: Meniscal tear, treated Technique: Multiplanar, multisequence MR imaging of the knee was performed without intravenous contrast.   Findings: ACL and PCL are intact. The MCL and lateral collateral ligaments complex are intact. The extensor mechanism is intact. Hoffa's fat pad and quadriceps fat pad are within normal limits. There is trace joint fluid noted. There is no bursal distention. The medial meniscus is intact Lateral meniscus is intact. There is full-thickness chondral loss noted within the nonweightbearing medial femoral condyle and measures approximately 9 mm in length with associated reactive marrow edema and subchondral cystic changes. There is moderate chondral fibrillation and heterogeneity of the nonweightbearing lateral femoral condyle cartilage with underlying reactive marrow edema. There is mild chondral fibrillation of the patellofemoral compartment cartilage. There are small tricompartmental osteophytes. Visualized muscles demonstrate normal size and signal.    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  Contrast was not administered. COMPARISON: None FINDINGS: Evaluation of the solid organs is limited due to lack of intravenous contrast. Lower chest: Atelectasis at the lung bases.  Cardiac valvular and coronary artery calcifications. URINARY COLLECTING SYSTEM: No calculi  in the kidneys, ureters, or urinary bladder. No hydronephrosis or ureterectasis.  Simple cyst midpole right kidney. Liver: Normal contour.  Borderline hepatic steatosis. Gallbladder and bile ducts: Unremarkable. Pancreas: Mild pancreatic inflammation.  Homogeneous unenhanced appearance of the pancreatic parenchyma. Spleen: Normal contour. Adrenals: Normal contour. Lymph nodes: Haziness is noted at the mesenteric root with prominent in number mesenteric lymph nodes.  Additional prominent soft tissue, possibly lymph node is noted in the gastrohepatic ligament region measuring on the order of 16 mm short axis (axial series 2, image 45). Bowel and mesentery: Small hiatal hernia.  No definite evidence of bowel obstruction.  Colonic diverticulosis.  Moderate to large volume colonic stool.  Normal appendix. Abdominal aorta: Heavy atherosclerosis of nonaneurysmal aorta.  Bilateral common iliac artery stents. Inferior vena cava: Unremarkable. Free fluid or free air: None. Pelvis: Unremarkable. Body wall: Small fat and vessel containing umbilical hernia. Bones: No definite acute abnormality.  Degenerative findings of the spine, sacroiliac joints, pubic symphysis, and hip joints.     1. No evidence of radiopaque urolithiasis or obstructive uropathy, as questioned. 2. Haziness at the mesenteric root with prominent number mesenteric lymph nodes, a nonspecific finding which may be reactive to infectious or noninfectious inflammatory gastrointestinal process or indicative of sclerosing mesenteritis.  However, several additional mesenteric and retroperitoneal lymph nodes are noted, including dominant gastrohepatic ligament node/kaelyn conglomerate measuring on the order of 16 mm short axis, the latter which appears relatively similar to lumbar spine CT 03/13/2022.  However, the possibility of a more aggressive process such as metastatic disease or lymphoma is difficult to exclude.  Recommend clinical correlation with comparison to  any recent outside imaging, available. 3. Additionally, there is haziness in the peripancreatic region, which could be related to the above mesenteric process, however the possibility of acute pancreatitis could appear similar.  Recommend clinical laboratory correlation in this regard. 4. Additional details, as provided in the body of report. Electronically signed by: Jesus Villa Date:    09/23/2023 Time:    13:32      Assessment  1. Atherosclerosis of native coronary artery of native heart with stable angina pectoris  Free of angina on current medical regimen    2. Stenosis of left carotid artery  Due for reassessment next year    3. Primary hypertension  Controlled    4. Hypercholesterolemia  Reassess  - Comprehensive Metabolic Panel; Future  - Lipid Panel; Future      Plan and Discussion    No change to current guideline directed medical therapy.  Once again, discussed smoking cessation.    The 10-year ASCVD risk score (Gorge DK, et al., 2019) is: 32.1%    Values used to calculate the score:      Age: 63 years      Sex: Female      Is Non- : Yes      Diabetic: Yes      Tobacco smoker: Yes      Systolic Blood Pressure: 120 mmHg      Is BP treated: Yes      HDL Cholesterol: 51 mg/dL      Total Cholesterol: 194 mg/dL     Follow Up  Follow up in about 6 months (around 4/20/2024).      Maurizio Kraus MD

## 2023-11-02 ENCOUNTER — PATIENT MESSAGE (OUTPATIENT)
Dept: CARDIOLOGY | Facility: CLINIC | Age: 63
End: 2023-11-02
Payer: MEDICARE

## 2023-11-03 ENCOUNTER — PATIENT MESSAGE (OUTPATIENT)
Dept: ADMINISTRATIVE | Facility: OTHER | Age: 63
End: 2023-11-03
Payer: MEDICARE

## 2023-11-27 DIAGNOSIS — I25.118 ATHEROSCLEROSIS OF NATIVE CORONARY ARTERY OF NATIVE HEART WITH STABLE ANGINA PECTORIS: Primary | ICD-10-CM

## 2023-11-27 DIAGNOSIS — E78.00 HYPERCHOLESTEROLEMIA: ICD-10-CM

## 2024-01-10 ENCOUNTER — OFFICE VISIT (OUTPATIENT)
Dept: URGENT CARE | Facility: CLINIC | Age: 64
End: 2024-01-10
Payer: MEDICARE

## 2024-01-10 VITALS
HEIGHT: 63 IN | TEMPERATURE: 99 F | HEART RATE: 106 BPM | SYSTOLIC BLOOD PRESSURE: 116 MMHG | BODY MASS INDEX: 28.13 KG/M2 | RESPIRATION RATE: 16 BRPM | DIASTOLIC BLOOD PRESSURE: 65 MMHG | WEIGHT: 158.75 LBS | OXYGEN SATURATION: 94 %

## 2024-01-10 DIAGNOSIS — R05.9 COUGH, UNSPECIFIED TYPE: ICD-10-CM

## 2024-01-10 DIAGNOSIS — J11.1 INFLUENZA: Primary | ICD-10-CM

## 2024-01-10 DIAGNOSIS — J02.9 SORE THROAT: ICD-10-CM

## 2024-01-10 LAB
CTP QC/QA: YES
POC MOLECULAR INFLUENZA A AGN: NEGATIVE
POC MOLECULAR INFLUENZA B AGN: NEGATIVE

## 2024-01-10 PROCEDURE — 87502 INFLUENZA DNA AMP PROBE: CPT | Mod: QW,S$GLB,,

## 2024-01-10 PROCEDURE — 99213 OFFICE O/P EST LOW 20 MIN: CPT | Mod: S$GLB,,,

## 2024-01-10 RX ORDER — CEFPODOXIME PROXETIL 200 MG/1
TABLET, FILM COATED ORAL
COMMUNITY

## 2024-01-10 RX ORDER — OSELTAMIVIR PHOSPHATE 75 MG/1
75 CAPSULE ORAL 2 TIMES DAILY
Qty: 10 CAPSULE | Refills: 0 | Status: SHIPPED | OUTPATIENT
Start: 2024-01-10 | End: 2024-01-15

## 2024-01-10 RX ORDER — ATORVASTATIN CALCIUM 40 MG/1
TABLET, FILM COATED ORAL
COMMUNITY

## 2024-01-10 RX ORDER — BENZONATATE 100 MG/1
100 CAPSULE ORAL 3 TIMES DAILY PRN
Qty: 30 CAPSULE | Refills: 0 | Status: SHIPPED | OUTPATIENT
Start: 2024-01-10 | End: 2024-01-20

## 2024-01-10 NOTE — PATIENT INSTRUCTIONS
Negative for flu today, but based on your close exposure with current symptoms, Id like to treat you for it.     Sometimes it may take 1 week to fully recover. You can return to work/school if fever free for 24 hours.     Start tamiflu twice per day and take as directed. Always take with food and water to avoid GI upset. Discontinue if there is concern for dehydration if you cannot tolerate.     When sick with a viral illness, cover your mouth and nose when sneezing and coughing. Wash hands frequently. Sanitize areas at home. Wear a mask in public if you need. Stay home if you are having fevers, chills, body aches, fatigue. Symptoms should resolve in 7-14 days. There is no specific treatment for viral illnesses. We treat symptoms with supportive care. Getting plenty of rest but completing light activities daily, eating a well-balanced diet, drinking plenty of fluids, managing stress levels can aid in faster recovery.       Try tessalon perles as directed during the day for your cough. It will help numb the back of your throat so you do not have the urge to cough.      Try a decongestant and corticosteroid nasal spray like flonase for the next few days for sinus relief. Initial: 2 sprays in each nostril once daily for 1 week. Reduce to 1 spray in each nostril once per day. Stop taking if you develop a nose bleed. Nasal saline spray can be used together with flonase to help moisten nostrils. An antihistamine like zyrtec, claritin, allegra can also be helpful for sinus relief and will help dry nasal passages.     Regular (Guaifenesin) Mucinex 1200 mg twice per day for 10 days can help thin secretions for better clearance. Drink plenty of fluids with this.     Honey is a natural cough suppressant.     If you do have Hypertension or palpitations, it is safe to take Coricidin HBP (multi-symptom flu) for relief of sinus symptoms.  Try DASH diet to help lower BP and buy a blood pressure cuff for home monitoring. Check blood  pressure at least 2 times per day and create a log. Avoid eating foods that are high in salt. Eat more foods with potassium, magnesium and calcium which will help dilate your vessels and decrease your BP.     Warm tea/warm liquids will help soothe the back of your throat. Warm water salt gurgles can also be helpful. A dry throat will cause pain. Make sure to stay hydrated. Water and pedilyte are the best to drink. Neti pot irrigation, humidifier in your room, avoiding fans, warm compresses to face, eating/drinking hot soups, hot shower before bedtime can help.     The recommended daily fluid intake for women is 2.7 liters (five 16 oz bottles).      Alternate Tylenol and ibuprofen every 4 hours as needed for fever and body aches.  Please take NSAIDs with a full glass of water and food to avoid GI upset.      Please only use over the counter cough and cold medications for 3-5 days at a time to avoid rebound symptoms.     Getting plenty of rest can aid in a faster recovery of illnesses.     Please follow-up with your primary care provider or return to the clinic if not better/worsening symptoms in 1 week.     Report to the ER if you have chest pain, shortness of breath, palpitations.

## 2024-01-27 ENCOUNTER — PATIENT MESSAGE (OUTPATIENT)
Dept: ADMINISTRATIVE | Facility: OTHER | Age: 64
End: 2024-01-27
Payer: MEDICARE

## 2024-03-06 NOTE — TELEPHONE ENCOUNTER
Specialty Pharmacy - Refill Coordination    Specialty Medication Orders Linked to Encounter      Flowsheet Row Most Recent Value   Medication #1 evolocumab (REPATHA SURECLICK) 140 mg/mL PnIj (Order#[id number], Rx#[id number])            Refill Questions - Documented Responses      Flowsheet Row Most Recent Value   Patient Availability and HIPAA Verification    Does patient want to proceed with activity? Yes   HIPAA/medical authority confirmed? Yes   Relationship to patient of person spoken to? Self   Refill Screening Questions    Would patient like to speak to a pharmacist? No   When does the patient need to receive the medication? 06/21/23   Refill Delivery Questions    How will the patient receive the medication? [facility]   When does the patient need to receive the medication? 06/21/23   Shipping Address Home   Address in [facility] confirmed and updated if neccessary? Yes   Expected Copay ($) 0   Is the patient able to afford the medication copay? Yes   Payment Method zero copay   Days supply of Refill 28   Supplies needed? No supplies needed   Refill activity completed? Yes   Refill activity plan Refill scheduled   Shipment/Pickup Date: 06/19/23            Current Outpatient Medications   Medication Sig    aspirin (ECOTRIN) 81 MG EC tablet Take 81 mg by mouth once daily.    benzonatate (TESSALON) 100 MG capsule benzonatate 100 mg capsule    cyclobenzaprine (FLEXERIL) 10 MG tablet cyclobenzaprine 10 mg tablet   TAKE 1 TABLET BY MOUTH TWICE DAILY AS NEEDED FOR MUSCLE SPASM    diazePAM (VALIUM) 10 MG Tab diazepam 10 mg tablet   TAKE 1 TO 2 TABLET BY MOUTH 1 HOUR PRIOR TO APPOINTMENT    evolocumab (REPATHA SURECLICK) 140 mg/mL PnIj Inject 1 mL (140 mg total) into the skin every 14 (fourteen) days.    evolocumab (REPATHA SYRINGE) 140 mg/mL Syrg Inject 140 mg into the skin.    ezetimibe (ZETIA) 10 mg tablet Take 1 tablet (10 mg total) by mouth once daily.    HYDROcodone-acetaminophen (NORCO) 5-325 mg per tablet  hydrocodone 5 mg-acetaminophen 325 mg tablet   TAKE 1 TABLET BY MOUTH EVERY 8 HOURS AS NEEDED AS NEEDED FOR PAIN    hydrOXYzine pamoate (VISTARIL) 25 MG Cap hydroxyzine pamoate 25 mg capsule    levocetirizine (XYZAL) 5 MG tablet levocetirizine 5 mg tablet   TAKE 1 TABLET BY MOUTH EVERY EVENING    losartan-hydrochlorothiazide 100-12.5 mg (HYZAAR) 100-12.5 mg Tab Take 1 tablet by mouth once daily.    methIMAzole (TAPAZOLE) 5 MG Tab methimazole 5 mg tablet   TAKE 1 TABLET BY MOUTH TWICE DAILY    naproxen (NAPROSYN) 500 MG tablet Take 1 tablet (500 mg total) by mouth 2 (two) times daily with meals.    oxyCODONE-acetaminophen (PERCOCET)  mg per tablet daily as needed.    varenicline (CHANTIX) 1 mg Tab Take 1 tablet (1 mg total) by mouth 2 (two) times daily.    zolpidem (AMBIEN) 10 mg Tab Take 10 mg by mouth once daily.   Last reviewed on 5/13/2023 11:04 AM by Del Robert RN    Review of patient's allergies indicates:  No Known Allergies Last reviewed on  5/13/2023 11:03 AM by Del Robert      Tasks added this encounter   No tasks added.   Tasks due within next 3 months   6/17/2023 - Refill Coordination Outreach (1 time occurrence)     Pearl Grissom, PharmD  [facility]  [address] [place] [id number]  Phone: [phone]  Fax: [phone]         18

## 2024-04-25 DIAGNOSIS — E78.00 HYPERCHOLESTEROLEMIA: ICD-10-CM

## 2024-04-26 ENCOUNTER — OFFICE VISIT (OUTPATIENT)
Dept: CARDIOLOGY | Facility: CLINIC | Age: 64
End: 2024-04-26
Payer: MEDICARE

## 2024-04-26 ENCOUNTER — PATIENT MESSAGE (OUTPATIENT)
Dept: CARDIOLOGY | Facility: CLINIC | Age: 64
End: 2024-04-26

## 2024-04-26 VITALS
DIASTOLIC BLOOD PRESSURE: 79 MMHG | BODY MASS INDEX: 30.86 KG/M2 | HEIGHT: 63 IN | OXYGEN SATURATION: 97 % | HEART RATE: 85 BPM | WEIGHT: 174.19 LBS | SYSTOLIC BLOOD PRESSURE: 185 MMHG

## 2024-04-26 DIAGNOSIS — E78.00 HYPERCHOLESTEROLEMIA: ICD-10-CM

## 2024-04-26 DIAGNOSIS — I25.118 ATHEROSCLEROSIS OF NATIVE CORONARY ARTERY OF NATIVE HEART WITH STABLE ANGINA PECTORIS: Primary | ICD-10-CM

## 2024-04-26 DIAGNOSIS — I10 PRIMARY HYPERTENSION: ICD-10-CM

## 2024-04-26 DIAGNOSIS — I65.22 STENOSIS OF LEFT CAROTID ARTERY: ICD-10-CM

## 2024-04-26 PROCEDURE — 93000 ELECTROCARDIOGRAM COMPLETE: CPT | Mod: S$GLB,,, | Performed by: INTERNAL MEDICINE

## 2024-04-26 PROCEDURE — 99214 OFFICE O/P EST MOD 30 MIN: CPT | Mod: S$GLB,,, | Performed by: INTERNAL MEDICINE

## 2024-04-26 PROCEDURE — 1126F AMNT PAIN NOTED NONE PRSNT: CPT | Mod: CPTII,S$GLB,, | Performed by: INTERNAL MEDICINE

## 2024-04-26 PROCEDURE — 3008F BODY MASS INDEX DOCD: CPT | Mod: CPTII,S$GLB,, | Performed by: INTERNAL MEDICINE

## 2024-04-26 PROCEDURE — 3078F DIAST BP <80 MM HG: CPT | Mod: CPTII,S$GLB,, | Performed by: INTERNAL MEDICINE

## 2024-04-26 PROCEDURE — 1160F RVW MEDS BY RX/DR IN RCRD: CPT | Mod: CPTII,S$GLB,, | Performed by: INTERNAL MEDICINE

## 2024-04-26 PROCEDURE — 3077F SYST BP >= 140 MM HG: CPT | Mod: CPTII,S$GLB,, | Performed by: INTERNAL MEDICINE

## 2024-04-26 PROCEDURE — 99999 PR PBB SHADOW E&M-EST. PATIENT-LVL IV: CPT | Mod: PBBFAC,,, | Performed by: INTERNAL MEDICINE

## 2024-04-26 PROCEDURE — 1159F MED LIST DOCD IN RCRD: CPT | Mod: CPTII,S$GLB,, | Performed by: INTERNAL MEDICINE

## 2024-04-26 RX ORDER — EVOLOCUMAB 140 MG/ML
140 INJECTION, SOLUTION SUBCUTANEOUS
Qty: 2 ML | Refills: 11 | Status: ACTIVE | OUTPATIENT
Start: 2024-04-26 | End: 2025-04-25

## 2024-04-26 NOTE — PROGRESS NOTES
[facility]    Chief Complaint  Chief Complaint   Patient presents with    Coronary Artery Disease       HPI:    Has cut back on smoking.  Been eating more and gained almost 20 lb.  Says her blood pressure is high because she forgot to take her medications today in the rush to get to a  which upset her.  Reports that her blood pressure has been well controlled when she checks elsewhere.    Medications  Current Outpatient Medications   Medication Sig Dispense Refill    aspirin (ECOTRIN) 81 MG EC tablet Take 81 mg by mouth once daily.      cyclobenzaprine (FLEXERIL) 10 MG tablet cyclobenzaprine 10 mg tablet   TAKE 1 TABLET BY MOUTH TWICE DAILY AS NEEDED FOR MUSCLE SPASM      evolocumab (REPATHA SURECLICK) 140 mg/mL PnIj Inject 1 mL (140 mg total) into the skin every 14 (fourteen) days. 2 mL 11    evolocumab (REPATHA SYRINGE) 140 mg/mL Syrg Inject 140 mg into the skin.      HYDROcodone-acetaminophen (NORCO) 5-325 mg per tablet hydrocodone 5 mg-acetaminophen 325 mg tablet   TAKE 1 TABLET BY MOUTH EVERY 8 HOURS AS NEEDED AS NEEDED FOR PAIN      hydrOXYzine pamoate (VISTARIL) 25 MG Cap hydroxyzine pamoate 25 mg capsule      losartan-hydrochlorothiazide 100-12.5 mg (HYZAAR) 100-12.5 mg Tab Take 1 tablet by mouth once daily.      methIMAzole (TAPAZOLE) 5 MG Tab methimazole 5 mg tablet   TAKE 1 TABLET BY MOUTH TWICE DAILY      oxyCODONE-acetaminophen (PERCOCET)  mg per tablet daily as needed.      zolpidem (AMBIEN) 10 mg Tab Take 10 mg by mouth once daily.      atorvastatin (LIPITOR) 40 MG tablet TK 1 T PO  D (Patient not taking: Reported on 2024)      benzonatate (TESSALON) 100 MG capsule benzonatate 100 mg capsule (Patient not taking: Reported on 2024)      cefpodoxime (VANTIN) 200 MG tablet  (Patient not taking: Reported on 2024)      diazePAM (VALIUM) 10 MG Tab diazepam 10 mg tablet   TAKE 1 TO 2 TABLET BY MOUTH 1 HOUR PRIOR TO APPOINTMENT (Patient not taking: Reported on  2024)      levocetirizine (XYZAL) 5 MG tablet levocetirizine 5 mg tablet   TAKE 1 TABLET BY MOUTH EVERY EVENING (Patient not taking: Reported on 2024)      naproxen (NAPROSYN) 500 MG tablet Take 1 tablet (500 mg total) by mouth 2 (two) times daily with meals. (Patient not taking: Reported on 1/10/2024) 60 tablet 0     No current facility-administered medications for this visit.        History  Past Medical History:   Diagnosis Date    Atherosclerotic peripheral vascular disease     Carotid stenosis     Coronary atherosclerosis     RCA-    Diabetes mellitus, type 2     Diverticulitis large intestine 2014    Hypercholesterolemia     Hypertension     Trochanteric bursitis of left hip 2023     Past Surgical History:   Procedure Laterality Date    ILIAC ARTERY STENT Left 2010    Maria T 8 x 29 mm    ILIAC ARTERY STENT Right 2010    Maria T 8 x 60 mm    TUBAL LIGATION      TYMPANOTOMY  2018     Social History     Socioeconomic History    Marital status:    Tobacco Use    Smoking status: Every Day     Current packs/day: 0.00     Types: Cigarettes     Last attempt to quit: 2021     Years since quittin.4    Smokeless tobacco: Never   Substance and Sexual Activity    Alcohol use: Yes    Drug use: Never    Sexual activity: Not Currently     Family History   Problem Relation Name Age of Onset    Coronary artery disease Mother      Coronary artery disease Father      Diabetes Father      Hypertension Sister      Hypertension Brother          Allergies  Review of patient's allergies indicates:  No Known Allergies    Review of Systems   Review of Systems   Constitutional: Positive for weight gain. Negative for malaise/fatigue and weight loss.   Eyes:  Negative for visual disturbance.   Cardiovascular:  Negative for chest pain, claudication, cyanosis, dyspnea on exertion, irregular heartbeat, leg swelling, near-syncope, orthopnea, palpitations, paroxysmal nocturnal dyspnea and  syncope.   Respiratory:  Negative for cough, hemoptysis, shortness of breath, sleep disturbances due to breathing and wheezing.    Hematologic/Lymphatic: Negative for bleeding problem. Does not bruise/bleed easily.   Skin:  Negative for poor wound healing.   Musculoskeletal:  Positive for back pain. Negative for muscle cramps and myalgias.   Gastrointestinal:  Negative for abdominal pain, anorexia, diarrhea, heartburn, hematemesis, hematochezia, melena, nausea and vomiting.   Genitourinary:  Negative for hematuria and nocturia.   Neurological:  Negative for excessive daytime sleepiness, dizziness, focal weakness, light-headedness and weakness.       Physical Exam  Vitals:    04/26/24 1150   BP: (!) 185/79   Pulse: 85     Wt Readings from Last 1 Encounters:   04/26/24 79 kg (174 lb 3.2 oz)     Physical Exam  Constitutional:       General: She is not in acute distress.     Appearance: She is not toxic-appearing or diaphoretic.   HENT:      Head: Normocephalic and atraumatic.   Eyes:      General: No scleral icterus.     Conjunctiva/sclera: Conjunctivae normal.   Neck:      Thyroid: No thyromegaly.      Vascular: No hepatojugular reflux or JVD.      Trachea: No tracheal deviation.   Cardiovascular:      Rate and Rhythm: Normal rate and regular rhythm. No extrasystoles are present.     Chest Wall: PMI is not displaced.      Pulses:           Carotid pulses are 2+ on the right side and 2+ on the left side with bruit.       Radial pulses are 2+ on the right side and 2+ on the left side.        Dorsalis pedis pulses are 1+ on the right side and 1+ on the left side.        Posterior tibial pulses are 1+ on the right side and 1+ on the left side.      Heart sounds: S1 normal and S2 normal. No murmur heard.     Gallop present. S4 sounds present. No S3 sounds.   Pulmonary:      Effort: No accessory muscle usage or respiratory distress.      Breath sounds: No decreased breath sounds, wheezing, rhonchi or rales.   Abdominal:       General: Bowel sounds are normal. There is abdominal bruit.      Palpations: Abdomen is soft. There is no hepatomegaly, splenomegaly or pulsatile mass.      Tenderness: There is no abdominal tenderness.   Musculoskeletal:         General: No tenderness or deformity.      Cervical back: Neck supple.   Skin:     General: Skin is warm and dry.      Coloration: Skin is not pale.      Nails: There is no clubbing.   Neurological:      Mental Status: She is alert and oriented to person, place, and time.      Cranial Nerves: No cranial nerve deficit.      Sensory: No sensory deficit.   Psychiatric:         Speech: Speech normal.         Behavior: Behavior normal. Behavior is cooperative.         Labs  Office Visit on 01/10/2024   Component Date Value Ref Range Status    POC Molecular Influenza A Ag 01/10/2024 Negative  Negative, Not Reported Final    POC Molecular Influenza B Ag 01/10/2024 Negative  Negative, Not Reported Final     Acceptable 01/10/2024 Yes   Final       Imaging  CT Abdomen Pelvis  Without Contrast    Result Date: 4/25/2024  INDICATION: abnormal GI imaging pancreas EXAM: [facility] [place] ABDOMEN PELVIS WO CONTRAST TECHNIQUE: A standard [facility] CT ABDOMEN PELVIS WO CONTRAST was performed with axial image acquisition. Multiplanar reformats were rendered. CQMS #436: An individualized dose optimization technique, Automated Exposure Control, was utilized for the performed procedure. COMPARISON: None FINDINGS: Lack of IV contrast severely limits diagnostic sensitivity. ABDOMEN: Heart size is normal.  Interstitial scarring of the right middle lobe, calcified granuloma right lung base noted.  Unenhanced liver is hypodense and appears minimally enlarged with a prominent right liver lobe.  Contour is smooth.  Focal fat sparing adjacent to gallbladder fossa noted, normal variant.  Unenhanced spleen, adrenal glands, and left kidney unremarkable.  There is a 22 mm right renal cyst.  Right kidney is otherwise  unremarkable.  The unenhanced pancreas appears grossly unremarkable.  There is a soft tissue density adjacent to the lesser curvature the stomach, measuring 14 x 18 mm diameter.  There is a 2nd soft tissue density between the pancreas and left liver lobe measuring 13 x 17 mm diameter.  A 3rd soft tissue density measuring 8 x 15 mm is adjacent to the inferior margin of the tail the pancreas.  There is a 13 x 19 mm portacaval lymph node and a 13 x 16 mm lymph node anterior to the IVC.  Subcentimeter mesenteric nodes are present throughout the mesenteric root.  No small bowel dilatation.  Normal amount stool, gas and oral contrast noted in the colon.  There is diffuse diverticulosis of the colon without focal inflammation.  Atheromatous disease aortoiliac system noted.  No aneurysm.  Bilateral common iliac artery stents noted. Pelvis: The appendix is identified, and is normal in size without inflammation.  Urinary bladder is unremarkable.  Diverticulosis of the rectosigmoid colon noted proximally, without focal inflammation.    The uterus is present, with some small calcifications within the right uterine fundus, possibly related to fibroids.  Absent contrast, further characterization is limited.  No pelvic free fluid.  No pelvic lymphadenopathy.  No suspicious bone lesions.    Multiple soft tissue densities around the pancreas, and lesser curvature the stomach, suspicious for either soft tissue masses or lymph nodes, with additional smoothly marginated lymphadenopathy within the portacaval region as described above.  Subcentimeter mesenteric nodes present throughout as well.  Etiology of lymphadenopathy is indeterminate.  Lack of IV contrast limits further characterization.  Consider MRI and/or PET scan. No visible pancreatic mass on this noncontrast CT.  Consider MRI if patient cannot tolerate iodinated IV contrast. Diverticulosis coli without focal diverticulitis. Mild hepatic steatosis.  Mild hepatomegaly. Right  renal cyst. Atheromatous disease.  Bilateral common iliac artery stents. Suspect prior uterine fibroids. Electronically Signed By: Vazquez Aldana MD 4/25/2024 4:01 PM CDT      Assessment  1. Atherosclerosis of native coronary artery of native heart with stable angina pectoris  Continue with medical therapy    2. Stenosis of left carotid artery  - CV Ultrasound Bilateral Doppler Carotid; Future    3. Hypercholesterolemia  Controlled    4. Primary hypertension  Elevated today but she reports good control      Plan and Discussion    No change to present management.  Encouraged weight loss and continued efforts at smoking cessation.  She is starting to exercise.    The 10-year ASCVD risk score (Gorge DK, et al., 2019) is: 55.3%    Values used to calculate the score:      Age: 64 years      Sex: Female      Is Non- : Yes      Diabetic: Yes      Tobacco smoker: Yes      Systolic Blood Pressure: 185 mmHg      Is BP treated: Yes      HDL Cholesterol: 52 mg/dL      Total Cholesterol: 135 mg/dL     Follow Up  Follow up in about 6 months (around 10/26/2024).      Maurizio Kraus MD

## 2024-04-29 LAB
OHS QRS DURATION: 84 MS
OHS QTC CALCULATION: 448 MS

## 2024-04-30 ENCOUNTER — TELEPHONE (OUTPATIENT)
Dept: CARDIOLOGY | Facility: CLINIC | Age: 64
End: 2024-04-30
Payer: MEDICARE

## 2024-04-30 NOTE — TELEPHONE ENCOUNTER
Left message on voice mail, orders for carotid ultrasound mailed to patient to schedule at [facility].

## 2024-07-05 ENCOUNTER — OFFICE VISIT (OUTPATIENT)
Dept: CARDIOLOGY | Facility: CLINIC | Age: 64
End: 2024-07-05
Payer: MEDICARE

## 2024-07-05 DIAGNOSIS — I10 PRIMARY HYPERTENSION: ICD-10-CM

## 2024-07-05 DIAGNOSIS — E78.00 HYPERCHOLESTEROLEMIA: ICD-10-CM

## 2024-07-05 DIAGNOSIS — I65.22 STENOSIS OF LEFT CAROTID ARTERY: Primary | ICD-10-CM

## 2024-07-05 DIAGNOSIS — I25.118 ATHEROSCLEROSIS OF NATIVE CORONARY ARTERY OF NATIVE HEART WITH STABLE ANGINA PECTORIS: ICD-10-CM

## 2024-07-05 PROCEDURE — 99999 PR PBB SHADOW E&M-EST. PATIENT-LVL III: CPT | Mod: PBBFAC,,, | Performed by: INTERNAL MEDICINE

## 2024-07-05 NOTE — PROGRESS NOTES
[facility]    Chief Complaint  Chief Complaint   Patient presents with    Hypertension       HPI:    Here for follow-up without complaints.  Has been checking her blood pressure regularly at home.  Good control.  Staying away from cigarettes.  Has stopped gaining weight and even lost a couple lb.  Occasional palpitations when she is at rest which she attributes to the recent death of her brother.    Medications  Current Outpatient Medications   Medication Sig Dispense Refill    aspirin (ECOTRIN) 81 MG EC tablet Take 81 mg by mouth once daily.      cyclobenzaprine (FLEXERIL) 10 MG tablet cyclobenzaprine 10 mg tablet   TAKE 1 TABLET BY MOUTH TWICE DAILY AS NEEDED FOR MUSCLE SPASM      evolocumab (REPATHA SURECLICK) 140 mg/mL PnIj Inject 1 mL (140 mg total) into the skin every 14 (fourteen) days. 2 mL 11    evolocumab (REPATHA SYRINGE) 140 mg/mL Syrg Inject 140 mg into the skin.      losartan-hydrochlorothiazide 100-12.5 mg (HYZAAR) 100-12.5 mg Tab Take 1 tablet by mouth once daily.      oxyCODONE-acetaminophen (PERCOCET)  mg per tablet daily as needed.      zolpidem (AMBIEN) 10 mg Tab Take 10 mg by mouth once daily.      atorvastatin (LIPITOR) 40 MG tablet TK 1 T PO  D (Patient not taking: Reported on 4/26/2024)      benzonatate (TESSALON) 100 MG capsule benzonatate 100 mg capsule (Patient not taking: Reported on 4/26/2024)      cefpodoxime (VANTIN) 200 MG tablet  (Patient not taking: Reported on 4/26/2024)      diazePAM (VALIUM) 10 MG Tab diazepam 10 mg tablet   TAKE 1 TO 2 TABLET BY MOUTH 1 HOUR PRIOR TO APPOINTMENT (Patient not taking: Reported on 4/26/2024)      HYDROcodone-acetaminophen (NORCO) 5-325 mg per tablet hydrocodone 5 mg-acetaminophen 325 mg tablet   TAKE 1 TABLET BY MOUTH EVERY 8 HOURS AS NEEDED AS NEEDED FOR PAIN (Patient not taking: Reported on 7/5/2024)      hydrOXYzine pamoate (VISTARIL) 25 MG Cap hydroxyzine pamoate 25 mg capsule (Patient not taking: Reported on 7/5/2024)       levocetirizine (XYZAL) 5 MG tablet levocetirizine 5 mg tablet   TAKE 1 TABLET BY MOUTH EVERY EVENING (Patient not taking: Reported on 2024)      methIMAzole (TAPAZOLE) 5 MG Tab methimazole 5 mg tablet   TAKE 1 TABLET BY MOUTH TWICE DAILY (Patient not taking: Reported on 2024)      naproxen (NAPROSYN) 500 MG tablet Take 1 tablet (500 mg total) by mouth 2 (two) times daily with meals. (Patient not taking: Reported on 1/10/2024) 60 tablet 0     No current facility-administered medications for this visit.        History  Past Medical History:   Diagnosis Date    Atherosclerotic peripheral vascular disease     Carotid stenosis     Coronary atherosclerosis     RCA-    Diabetes mellitus, type 2     Diverticulitis large intestine 2014    Hypercholesterolemia     Hypertension     Trochanteric bursitis of left hip 2023     Past Surgical History:   Procedure Laterality Date    ILIAC ARTERY STENT Left 2010    Maria T 8 x 29 mm    ILIAC ARTERY STENT Right 2010    Maria T 8 x 60 mm    TUBAL LIGATION      TYMPANOTOMY  2018     Social History     Socioeconomic History    Marital status:    Tobacco Use    Smoking status: Every Day     Current packs/day: 0.00     Types: Cigarettes     Last attempt to quit: 2021     Years since quittin.6    Smokeless tobacco: Never   Substance and Sexual Activity    Alcohol use: Yes    Drug use: Never    Sexual activity: Not Currently     Family History   Problem Relation Name Age of Onset    Coronary artery disease Mother      Coronary artery disease Father      Diabetes Father      Hypertension Sister      Hypertension Brother          Allergies  Review of patient's allergies indicates:  No Known Allergies    Review of Systems   Review of Systems   Constitutional: Negative for malaise/fatigue, weight gain and weight loss.   Eyes:  Negative for visual disturbance.   Cardiovascular:  Negative for chest pain, claudication, cyanosis, dyspnea on  exertion, irregular heartbeat, leg swelling, near-syncope, orthopnea, palpitations, paroxysmal nocturnal dyspnea and syncope.   Respiratory:  Negative for cough, hemoptysis, shortness of breath, sleep disturbances due to breathing and wheezing.    Hematologic/Lymphatic: Negative for bleeding problem. Does not bruise/bleed easily.   Skin:  Negative for poor wound healing.   Musculoskeletal:  Positive for back pain. Negative for muscle cramps and myalgias.   Gastrointestinal:  Negative for abdominal pain, anorexia, diarrhea, heartburn, hematemesis, hematochezia, melena, nausea and vomiting.   Genitourinary:  Negative for hematuria and nocturia.   Neurological:  Negative for excessive daytime sleepiness, dizziness, focal weakness, light-headedness and weakness.       Physical Exam  Vitals:    07/05/24 1528   BP: (!) (P) 102/54   Pulse: (P) 94     Wt Readings from Last 1 Encounters:   07/05/24 (P) 78.4 kg (172 lb 13.5 oz)     Physical Exam  Constitutional:       General: She is not in acute distress.     Appearance: She is not toxic-appearing or diaphoretic.   HENT:      Head: Normocephalic and atraumatic.   Eyes:      General: No scleral icterus.     Conjunctiva/sclera: Conjunctivae normal.   Neck:      Thyroid: No thyromegaly.      Vascular: No hepatojugular reflux or JVD.      Trachea: No tracheal deviation.   Cardiovascular:      Rate and Rhythm: Normal rate and regular rhythm. No extrasystoles are present.     Chest Wall: PMI is not displaced.      Pulses:           Carotid pulses are 2+ on the right side and 2+ on the left side with bruit.       Radial pulses are 2+ on the right side and 2+ on the left side.        Dorsalis pedis pulses are 1+ on the right side and 1+ on the left side.        Posterior tibial pulses are 1+ on the right side and 1+ on the left side.      Heart sounds: S1 normal and S2 normal. No murmur heard.     Gallop present. S4 sounds present. No S3 sounds.   Pulmonary:      Effort: No  accessory muscle usage or respiratory distress.      Breath sounds: No decreased breath sounds, wheezing, rhonchi or rales.   Abdominal:      General: Bowel sounds are normal. There is abdominal bruit.      Palpations: Abdomen is soft. There is no hepatomegaly, splenomegaly or pulsatile mass.      Tenderness: There is no abdominal tenderness.   Musculoskeletal:         General: No tenderness or deformity.      Cervical back: Neck supple.   Skin:     General: Skin is warm and dry.      Coloration: Skin is not pale.      Nails: There is no clubbing.   Neurological:      Mental Status: She is alert and oriented to person, place, and time.      Cranial Nerves: No cranial nerve deficit.      Sensory: No sensory deficit.   Psychiatric:         Speech: Speech normal.         Behavior: Behavior normal. Behavior is cooperative.         Labs  Office Visit on 04/26/2024   Component Date Value Ref Range Status    QRS Duration 04/26/2024 84  ms Final    [facility] QTC Calculation 04/26/2024 448  ms Final   Office Visit on 01/10/2024   Component Date Value Ref Range Status    POC Molecular Influenza A Ag 01/10/2024 Negative  Negative, Not Reported Final    POC Molecular Influenza B Ag 01/10/2024 Negative  Negative, Not Reported Final     Acceptable 01/10/2024 Yes   Final       Imaging  No results found.    Assessment  1. Stenosis of left carotid artery  - CV Ultrasound Bilateral Doppler Carotid; Future    2. Atherosclerosis of native coronary artery of native heart with stable angina pectoris  Stable and free of angina    3. Hypercholesterolemia  Controlled    4. Primary hypertension  Controlled      Plan and Discussion    No change to current guideline directed medical therapy.    The 10-year ASCVD risk score (Gorge MIN, et al., 2019) is: 18.7%    Values used to calculate the score:      Age: 64 years      Sex: Female      Is Non- : Yes      Diabetic: Yes      Tobacco smoker: Yes      Systolic  Blood Pressure: 102 mmHg      Is BP treated: Yes      HDL Cholesterol: 51 mg/dL      Total Cholesterol: 140 mg/dL     Follow Up  Follow up in about 6 months (around 1/5/2025).      Maurizio Kraus MD

## 2024-07-30 ENCOUNTER — TELEPHONE (OUTPATIENT)
Dept: CARDIOLOGY | Facility: CLINIC | Age: 64
End: 2024-07-30
Payer: MEDICARE

## 2024-07-30 NOTE — TELEPHONE ENCOUNTER
Spoke with patient.  Patient aware of bilateral carotid duplex findings.  She will follow up with Dr. Mckinley Martinez at [facility].  Current phone numbers given to patient.  Patient verbalizes understanding.  Patient will also contact Dr. Kimmy Kraus for a referral if she has any trouble getting in to the see the physician.

## 2024-08-01 ENCOUNTER — TELEPHONE (OUTPATIENT)
Dept: CARDIOLOGY | Facility: CLINIC | Age: 64
End: 2024-08-01
Payer: MEDICARE

## 2024-08-01 NOTE — TELEPHONE ENCOUNTER
Spoke with patient.  She wants to see Dr. Kraus in clinic, E-Visit or virtual regarding carotid duplex results.  Will review with Dr. Kraus.

## 2024-08-01 NOTE — TELEPHONE ENCOUNTER
----- Message from Anmol Eric sent at 8/1/2024  2:46 PM CDT -----   Name of Who is Calling:     What is the request in detail:  patient request call back in reference to today's appointment / patient is in area and want to know if can come to office now Please contact to further discuss and advise      Can the clinic reply by [facility]:     What Number to Call Back if not in [facility]:   [phone]

## 2024-08-16 ENCOUNTER — OFFICE VISIT (OUTPATIENT)
Dept: CARDIOLOGY | Facility: CLINIC | Age: 64
End: 2024-08-16
Payer: MEDICARE

## 2024-08-16 DIAGNOSIS — Z01.810 PREOPERATIVE CARDIOVASCULAR EXAMINATION: ICD-10-CM

## 2024-08-16 DIAGNOSIS — I25.118 ATHEROSCLEROSIS OF NATIVE CORONARY ARTERY OF NATIVE HEART WITH STABLE ANGINA PECTORIS: ICD-10-CM

## 2024-08-16 DIAGNOSIS — I65.22 STENOSIS OF LEFT CAROTID ARTERY: Primary | ICD-10-CM

## 2024-08-16 DIAGNOSIS — R42 DIZZINESS AND GIDDINESS: ICD-10-CM

## 2024-08-16 NOTE — PROGRESS NOTES
[facility]    The patient location is:  [place], [place]  The chief complaint leading to consultation is:  Follow-up of carotid stenosis    Visit type: audiovisual    Face to Face time with patient: 10  16 minutes of total time spent on the encounter, which includes face to face time and non-face to face time preparing to see the patient (eg, review of tests), Obtaining and/or reviewing separately obtained history, Documenting clinical information in the electronic or other health record, Independently interpreting results (not separately reported) and communicating results to the patient/family/caregiver, or Care coordination (not separately reported).     Each patient to whom he or she provides medical services by telemedicine is:  (1) informed of the relationship between the physician and patient and the respective role of any other health care provider with respect to management of the patient; and (2) notified that he or she may decline to receive medical services by telemedicine and may withdraw from such care at any time.      Chief Complaint  Chief Complaint   Patient presents with    Carotid Artery Disease       HPI:    Carotid Doppler showed worsening of her stenosis on the left side.  She remains asymptomatic.  Was referred to Dr. Martinez for consideration of endarterectomy.  They have discussed surgery and all seem willing to proceed.  She presents today to further discuss that result and recommendation.  Continues to feel well.    Medications  Current Outpatient Medications   Medication Sig Dispense Refill    aspirin (ECOTRIN) 81 MG EC tablet Take 81 mg by mouth once daily.      atorvastatin (LIPITOR) 40 MG tablet TK 1 T PO  D (Patient not taking: Reported on 4/26/2024)      benzonatate (TESSALON) 100 MG capsule benzonatate 100 mg capsule (Patient not taking: Reported on 4/26/2024)      cefpodoxime (VANTIN) 200 MG tablet  (Patient not taking: Reported on 4/26/2024)       cyclobenzaprine (FLEXERIL) 10 MG tablet cyclobenzaprine 10 mg tablet   TAKE 1 TABLET BY MOUTH TWICE DAILY AS NEEDED FOR MUSCLE SPASM      diazePAM (VALIUM) 10 MG Tab diazepam 10 mg tablet   TAKE 1 TO 2 TABLET BY MOUTH 1 HOUR PRIOR TO APPOINTMENT (Patient not taking: Reported on 4/26/2024)      evolocumab (REPATHA SURECLICK) 140 mg/mL PnIj Inject 1 mL (140 mg total) into the skin every 14 (fourteen) days. 2 mL 11    evolocumab (REPATHA SYRINGE) 140 mg/mL Syrg Inject 140 mg into the skin.      HYDROcodone-acetaminophen (NORCO) 5-325 mg per tablet hydrocodone 5 mg-acetaminophen 325 mg tablet   TAKE 1 TABLET BY MOUTH EVERY 8 HOURS AS NEEDED AS NEEDED FOR PAIN (Patient not taking: Reported on 7/5/2024)      hydrOXYzine pamoate (VISTARIL) 25 MG Cap hydroxyzine pamoate 25 mg capsule (Patient not taking: Reported on 7/5/2024)      levocetirizine (XYZAL) 5 MG tablet levocetirizine 5 mg tablet   TAKE 1 TABLET BY MOUTH EVERY EVENING (Patient not taking: Reported on 4/26/2024)      losartan-hydrochlorothiazide 100-12.5 mg (HYZAAR) 100-12.5 mg Tab Take 1 tablet by mouth once daily.      methIMAzole (TAPAZOLE) 5 MG Tab methimazole 5 mg tablet   TAKE 1 TABLET BY MOUTH TWICE DAILY (Patient not taking: Reported on 7/5/2024)      naproxen (NAPROSYN) 500 MG tablet Take 1 tablet (500 mg total) by mouth 2 (two) times daily with meals. (Patient not taking: Reported on 1/10/2024) 60 tablet 0    oxyCODONE-acetaminophen (PERCOCET)  mg per tablet daily as needed.      zolpidem (AMBIEN) 10 mg Tab Take 10 mg by mouth once daily.       No current facility-administered medications for this visit.        History  Past Medical History:   Diagnosis Date    Atherosclerotic peripheral vascular disease     Carotid stenosis     Coronary atherosclerosis     RCA-    Diabetes mellitus, type 2     Diverticulitis large intestine 05/2014    Hypercholesterolemia     Hypertension     Trochanteric bursitis of left hip 5/9/2023     Past Surgical  History:   Procedure Laterality Date    ILIAC ARTERY STENT Left 2010    Maria T 8 x 29 mm    ILIAC ARTERY STENT Right 2010    Maria T 8 x 60 mm    TUBAL LIGATION      TYMPANOTOMY  2018     Social History     Socioeconomic History    Marital status:    Tobacco Use    Smoking status: Every Day     Current packs/day: 0.00     Types: Cigarettes     Last attempt to quit: 2021     Years since quittin.7    Smokeless tobacco: Never   Substance and Sexual Activity    Alcohol use: Yes    Drug use: Never    Sexual activity: Not Currently     Social Determinants of Health     Financial Resource Strain: Medium Risk (2024)    Overall Financial Resource Strain (CARDIA)     Difficulty of Paying Living Expenses: Somewhat hard   Food Insecurity: Food Insecurity Present (2024)    Hunger Vital Sign     Worried About Running Out of Food in the Last Year: Sometimes true     Ran Out of Food in the Last Year: Sometimes true   Physical Activity: Inactive (2024)    Exercise Vital Sign     Days of Exercise per Week: 0 days     Minutes of Exercise per Session: 0 min   Stress: No Stress Concern Present (2024)    [employer] - Occupational Stress Questionnaire     Feeling of Stress : Not at all   Housing Stability: Unknown (2024)    Housing Stability Vital Sign     Unable to Pay for Housing in the Last Year: No     Family History   Problem Relation Name Age of Onset    Coronary artery disease Mother      Coronary artery disease Father      Diabetes Father      Hypertension Sister      Hypertension Brother         Allergies  Review of patient's allergies indicates:  No Known Allergies    Review of Systems   ROS    Physical Exam  There were no vitals filed for this visit.  Wt Readings from Last 1 Encounters:   24 (P) 78.4 kg (172 lb 13.5 oz)     Physical Exam    Labs  Office Visit on 2024   Component Date Value Ref Range Status    QRS Duration  04/26/2024 84  ms Final    [facility] QTC Calculation 04/26/2024 448  ms Final       Imaging  No results found.    Assessment  1. Stenosis of left carotid artery  Asymptomatic.  Plans for endarterectomy    2. Atherosclerosis of native coronary artery of native heart with stable angina pectoris  Stable and free of angina on medical therapy    3. Preoperative cardiovascular examination  She will need a stress test for preoperative risk stratification.  Last stress test 2 years ago was negative for ischemia.      Plan and Discussion    Will try to set up for a CTA of her head and neck and a Lexiscan nuclear stress test.  Both need to be done at [facility] because of insurance considerations.  She is trying her best to stop smoking.    Follow Up  As scheduled      Maurizio Kraus MD

## 2024-08-19 ENCOUNTER — TELEPHONE (OUTPATIENT)
Dept: CARDIOLOGY | Facility: CLINIC | Age: 64
End: 2024-08-19
Payer: MEDICARE

## 2024-08-19 NOTE — TELEPHONE ENCOUNTER
----- Message from Clay Javier sent at 8/19/2024  4:25 PM CDT -----  Regarding: Vascular yon [facility] office  Type: Patient Call Back    Who called:[facility]     What is the request in detail:calling to speak to the office regarding CTA stress test and the nuclear orders. Need to speak to this office     Can the clinic reply by [facility]?no    Would the patient rather a call back or a response via [facility]? callback    Best call back number:[phone]  Do    Additional Information:

## 2024-08-19 NOTE — TELEPHONE ENCOUNTER
----- Message from Clay Herrera sent at 8/19/2024  3:37 PM CDT -----  Regarding: Dr Becky Kraus  Type: Patient Call Back    Who called:Dr nafisa Kraus     What is the request in detail:calling to get questions answered about the orders they are needing to do for this office     Can the clinic reply by [facility]?no    Would the patient rather a call back or a response via [facility]? callback    Best call back number:[phone]    Additional Information:

## 2024-08-21 ENCOUNTER — TELEPHONE (OUTPATIENT)
Dept: VASCULAR SURGERY | Facility: CLINIC | Age: 64
End: 2024-08-21
Payer: MEDICARE

## 2024-08-21 NOTE — TELEPHONE ENCOUNTER
Called and spoke with pt. Unable to schedule appt. with pt. current insurance. Pt. to speak with her insurance to see who is covered under her insurance that she can see. Pt.verbalized understanding.

## 2024-08-21 NOTE — TELEPHONE ENCOUNTER
----- Message from Sylvester Lesly Rylee sent at 8/21/2024 12:33 PM CDT -----  Regarding: call back  Type: Patient Call Back    Who called: pt     What is the request in detail: requesting call to schedule a new pt appt      Can the clinic reply by [facility]?no    Would the patient rather a call back or a response via [facility]? call    Best call back number: [phone]     Additional Information:

## 2024-09-06 ENCOUNTER — PATIENT MESSAGE (OUTPATIENT)
Dept: CARDIOLOGY | Facility: CLINIC | Age: 64
End: 2024-09-06
Payer: MEDICARE

## 2024-09-06 ENCOUNTER — TELEPHONE (OUTPATIENT)
Dept: CARDIOLOGY | Facility: CLINIC | Age: 64
End: 2024-09-06
Payer: MEDICARE

## 2024-09-06 NOTE — TELEPHONE ENCOUNTER
Spoke with patient and reviewed her stress test results.  Patient aware of findings.  She will follow up with vascular surgeon as scheduled.  Patient verbalizes understanding.

## 2024-11-01 ENCOUNTER — OFFICE VISIT (OUTPATIENT)
Dept: CARDIOLOGY | Facility: CLINIC | Age: 64
End: 2024-11-01
Payer: MEDICARE

## 2024-11-01 VITALS — HEART RATE: 91 BPM | OXYGEN SATURATION: 96 % | WEIGHT: 167.69 LBS | BODY MASS INDEX: 29.7 KG/M2

## 2024-11-01 DIAGNOSIS — I25.118 ATHEROSCLEROSIS OF NATIVE CORONARY ARTERY OF NATIVE HEART WITH STABLE ANGINA PECTORIS: Primary | ICD-10-CM

## 2024-11-01 DIAGNOSIS — E78.00 HYPERCHOLESTEROLEMIA: ICD-10-CM

## 2024-11-01 DIAGNOSIS — I10 PRIMARY HYPERTENSION: ICD-10-CM

## 2024-11-01 DIAGNOSIS — I65.22 STENOSIS OF LEFT CAROTID ARTERY: ICD-10-CM

## 2024-11-01 PROCEDURE — 99999 PR PBB SHADOW E&M-EST. PATIENT-LVL III: CPT | Mod: PBBFAC,,, | Performed by: INTERNAL MEDICINE

## 2024-11-01 RX ORDER — TIRZEPATIDE 2.5 MG/.5ML
INJECTION, SOLUTION SUBCUTANEOUS
COMMUNITY

## 2024-11-06 ENCOUNTER — TELEPHONE (OUTPATIENT)
Dept: SMOKING CESSATION | Facility: CLINIC | Age: 64
End: 2024-11-06
Payer: MEDICARE

## 2024-11-06 NOTE — TELEPHONE ENCOUNTER
Smoking Cessation clinic - Called pt after 10 mins no show to in-clinic intake appointment. Pt answered phone and states she is not able to take this phone call visit as she is currently picking up her daughter from school. Asked pt if she would like to reschedule appt and pt states she is not able to come into clinic at all. Asked pt is she would be interested in a VIRTUAL visit and pt hung up phone. Called pt back and left voicemail with  phone number.  -Mau Salazar, WINDYN, LDN, CTTS, [facility]  Smoking Cessation : [phone]

## 2024-11-15 NOTE — TELEPHONE ENCOUNTER
Date of surgery: 2009  Procedure: Lap Band  Performing surgeon:  Dr. Rodríguez    Initial Weight - 330lb  Current Weight -283lb  Mateo Weight - 220 lb  Total Body Weight Loss (EWL)- 6.6%  EWL% - 6%  TWB % -2%     Specialty Pharmacy - Refill Coordination    Specialty Medication Orders Linked to Encounter      Flowsheet Row Most Recent Value   Medication #1 evolocumab (REPATHA SURECLICK) 140 mg/mL PnIj (Order#[id number], Rx#[id number])            Refill Questions - Documented Responses      Flowsheet Row Most Recent Value   Patient Availability and HIPAA Verification    Does patient want to proceed with activity? Yes   HIPAA/medical authority confirmed? Yes   Relationship to patient of person spoken to? Self   Refill Screening Questions    Would patient like to speak to a pharmacist? No   When does the patient need to receive the medication? 08/16/23   Refill Delivery Questions    How will the patient receive the medication? MEDRx   When does the patient need to receive the medication? 08/16/23   Shipping Address Home   Address in [facility] confirmed and updated if neccessary? Yes   Expected Copay ($) 0   Is the patient able to afford the medication copay? Yes   Payment Method zero copay   Days supply of Refill 28   Supplies needed? No supplies needed   Refill activity completed? Yes   Refill activity plan Refill scheduled   Shipment/Pickup Date: 08/14/23            Current Outpatient Medications   Medication Sig    aspirin (ECOTRIN) 81 MG EC tablet Take 81 mg by mouth once daily.    benzonatate (TESSALON) 100 MG capsule benzonatate 100 mg capsule    cyclobenzaprine (FLEXERIL) 10 MG tablet cyclobenzaprine 10 mg tablet   TAKE 1 TABLET BY MOUTH TWICE DAILY AS NEEDED FOR MUSCLE SPASM    diazePAM (VALIUM) 10 MG Tab diazepam 10 mg tablet   TAKE 1 TO 2 TABLET BY MOUTH 1 HOUR PRIOR TO APPOINTMENT    evolocumab (REPATHA SURECLICK) 140 mg/mL PnIj Inject 1 mL (140 mg total) into the skin every 14 (fourteen) days.    evolocumab (REPATHA SYRINGE) 140 mg/mL Syrg Inject 140 mg into the skin.    ezetimibe (ZETIA) 10 mg tablet Take 1 tablet (10 mg total) by mouth once daily.    HYDROcodone-acetaminophen (NORCO) 5-325 mg per tablet  hydrocodone 5 mg-acetaminophen 325 mg tablet   TAKE 1 TABLET BY MOUTH EVERY 8 HOURS AS NEEDED AS NEEDED FOR PAIN    hydrOXYzine pamoate (VISTARIL) 25 MG Cap hydroxyzine pamoate 25 mg capsule    levocetirizine (XYZAL) 5 MG tablet levocetirizine 5 mg tablet   TAKE 1 TABLET BY MOUTH EVERY EVENING    losartan-hydrochlorothiazide 100-12.5 mg (HYZAAR) 100-12.5 mg Tab Take 1 tablet by mouth once daily.    methIMAzole (TAPAZOLE) 5 MG Tab methimazole 5 mg tablet   TAKE 1 TABLET BY MOUTH TWICE DAILY    naproxen (NAPROSYN) 500 MG tablet Take 1 tablet (500 mg total) by mouth 2 (two) times daily with meals.    oxyCODONE-acetaminophen (PERCOCET)  mg per tablet daily as needed.    varenicline (CHANTIX) 1 mg Tab Take 1 tablet (1 mg total) by mouth 2 (two) times daily.    zolpidem (AMBIEN) 10 mg Tab Take 10 mg by mouth once daily.   Last reviewed on 5/13/2023 11:04 AM by Del Robert, RN    Review of patient's allergies indicates:  No Known Allergies Last reviewed on  5/13/2023 11:03 AM by Del Robert      Tasks added this encounter   No tasks added.   Tasks due within next 3 months   8/7/2023 - Refill Coordination Outreach (1 time occurrence)     [facility]  [address] [place] [id number]  Phone: [phone]  Fax: [phone]

## 2025-01-03 DIAGNOSIS — E78.00 HYPERCHOLESTEROLEMIA: ICD-10-CM

## 2025-01-06 RX ORDER — EVOLOCUMAB 140 MG/ML
INJECTION, SOLUTION SUBCUTANEOUS
Qty: 2 ML | Refills: 11 | Status: SHIPPED | OUTPATIENT
Start: 2025-01-06

## 2025-01-14 ENCOUNTER — TELEPHONE (OUTPATIENT)
Dept: SMOKING CESSATION | Facility: CLINIC | Age: 65
End: 2025-01-14
Payer: MEDICARE

## 2025-01-14 NOTE — TELEPHONE ENCOUNTER
Called patient due to missed smoking cessation intake appointment, she has rescheduled for 1/15/2025 @ 11:00 am via telephone due to patient stating of a recent back surgery.

## 2025-01-15 ENCOUNTER — CLINICAL SUPPORT (OUTPATIENT)
Dept: SMOKING CESSATION | Facility: CLINIC | Age: 65
End: 2025-01-15
Payer: COMMERCIAL

## 2025-01-15 DIAGNOSIS — F17.200 NICOTINE DEPENDENCE: Primary | ICD-10-CM

## 2025-01-15 PROCEDURE — 99404 PREV MED CNSL INDIV APPRX 60: CPT | Mod: S$GLB,,,

## 2025-01-15 PROCEDURE — 99999 PR PBB SHADOW E&M-EST. PATIENT-LVL III: CPT | Mod: PBBFAC,,,

## 2025-01-15 RX ORDER — IBUPROFEN 200 MG
1 TABLET ORAL DAILY
Qty: 21 PATCH | Refills: 0 | Status: SHIPPED | OUTPATIENT
Start: 2025-01-15

## 2025-01-15 NOTE — Clinical Note
Patient will be participating in tobacco cessation sessions and will begin prescribed tobacco cessation medication regime of 21mg nicotine patch, prn in place of cigarette Patient is currently smoking 1 pack ppd of cigarettes

## 2025-02-13 ENCOUNTER — CLINICAL SUPPORT (OUTPATIENT)
Dept: SMOKING CESSATION | Facility: CLINIC | Age: 65
End: 2025-02-13
Payer: COMMERCIAL

## 2025-02-13 DIAGNOSIS — F17.200 NICOTINE DEPENDENCE: Primary | ICD-10-CM

## 2025-02-13 PROCEDURE — 99999 PR PBB SHADOW E&M-EST. PATIENT-LVL I: CPT | Mod: PBBFAC,,,

## 2025-02-13 RX ORDER — IBUPROFEN 200 MG
1 TABLET ORAL DAILY
Qty: 28 PATCH | Refills: 0 | Status: SHIPPED | OUTPATIENT
Start: 2025-02-13

## 2025-02-13 NOTE — Clinical Note
Trust 2 of 9. Patient continues to smoke 5-6 cigs/day. Patient shared her patches ran out and she has not smoked cigarettes in two days, but is eager to resume her patches.  Commended patient on accomplishment thus far.  Discussed and reviewed strategies, cues, and triggers. Pt remains on tobacco cessation medication of 21mg with no negative side effects at this time. Discussed lowering dosage of tobacco cessation medication from 21mg patches to 14mg patches. Patient consented to try the 14mg tobacco cessation patches for one week. The patient will continue with therapy sessions and medication monitoring by CTTS. Prescribed medication management will be by physician. Refill sent to pharmac

## 2025-02-13 NOTE — PROGRESS NOTES
Individual Follow-Up Form    2/13/2025    Quit Date:     Clinical Status of Patient: Outpatient    Length of Service: 60 minutes    Continuing Medication: yes  Patches    Other Medications:      Target Symptoms: Withdrawal and medication side effects. The following were rated moderate (3) to severe (4) on TCRS:  Moderate (3): none  Severe (4): none    The patient location is:[place], [place]  The chief complaint leading to consultation is: Nicotine dependence    Visit type: audiovisual    Face to Face time with patient:   60 minutes of total time spent on the encounter, which includes face to face time and non-face to face time preparing to see the patient (eg, review of tests), Obtaining and/or reviewing separately obtained history, Documenting clinical information in the electronic or other health record, Independently interpreting results (not separately reported) and communicating results to the patient/family/caregiver, or Care coordination (not separately reported).     Each patient to whom he or she provides medical services by telemedicine is:  (1) informed of the relationship between the physician and patient and the respective role of any other health care provider with respect to management of the patient; and (2) notified that he or she may decline to receive medical services by telemedicine and may withdraw from such care at any time.    Comments: Trust 2 of 9. Patient continues to smoke 5-6 cigs/day. Patient shared her patches ran out and she has not smoked cigarettes in two days, but is eager to resume her patches.  Commended patient on accomplishment thus far.  Discussed and reviewed strategies, cues, and triggers. Pt remains on tobacco cessation medication of 21mg with no negative side effects at this time. Discussed lowering dosage of tobacco cessation medication from 21mg patches to 14mg patches. Patient consented to try the 14mg tobacco cessation patches for one week. The patient will continue with  therapy sessions and medication monitoring by CTTS. Prescribed medication management will be by physician. Refill sent to pharmacy.    Diagnosis: F17.200    Next Visit: 1 week

## 2025-02-20 ENCOUNTER — CLINICAL SUPPORT (OUTPATIENT)
Dept: SMOKING CESSATION | Facility: CLINIC | Age: 65
End: 2025-02-20
Payer: COMMERCIAL

## 2025-02-20 ENCOUNTER — TELEPHONE (OUTPATIENT)
Dept: SMOKING CESSATION | Facility: CLINIC | Age: 65
End: 2025-02-20
Payer: MEDICAID

## 2025-02-20 DIAGNOSIS — F17.200 NICOTINE DEPENDENCE: Primary | ICD-10-CM

## 2025-02-20 RX ORDER — DM/P-EPHED/ACETAMINOPH/DOXYLAM 30-7.5/3
2 LIQUID (ML) ORAL
Qty: 72 LOZENGE | Refills: 0 | Status: SHIPPED | OUTPATIENT
Start: 2025-02-20

## 2025-02-20 NOTE — TELEPHONE ENCOUNTER
Called patient for missed smoking cessation follow-up appointment. Left detail voicemail for member to call, Mrs. Bob at [phone] to reschedule.

## 2025-02-20 NOTE — Clinical Note
Trust 3 of 9. Patient continues to smoke 3-5 cigs/day. Patient shared she knows the patches are working and she is really motivated to stop smoking for her next medical appointment in April. Commended patient on accomplishment thus far. Pt. shared she noticed she is gaining weight and asked for interventions. Discussed and explored with patient healthy options to keep on hand to help with cravings, (sunflower seeds, carrots, fruits, and sugar-free gum or hard candy) Pt was receptive to suggestions.  Discussed and reviewed strategies, cues, and triggers with adding 2 mg nicotine lozenge, PRN (in place of a cigarette) to aid in managing cravings. Patient consented to trying the 2 mg nicotine lozenges. Prescription sent to Pharmacy. Pt remains on tobacco cessation medication of 14 mg nicotine patch with no negative side effects at this time.  The patient will continue with therapy sessions and medication monitoring by CTTS. Prescribed medication management will be by physician.

## 2025-02-20 NOTE — PROGRESS NOTES
Individual Follow-Up Form    2/20/2025    Quit Date:     Clinical Status of Patient: Outpatient    Length of Service: 45 minutes    Continuing Medication: yes  Patches    Other Medications:      Target Symptoms: Withdrawal and medication side effects. The following were  rated moderate (3) to severe (4) on TCRS:  Moderate (3): none  Severe (4): none    The patient location is:[place], [place]  The chief complaint leading to consultation is: Nicotine dependence     Visit type: audiovisual     Face to Face time with patient:   45 minutes of total time spent on the encounter, which includes face to face time and non-face to face time preparing to see the patient (eg, review of tests), Obtaining and/or reviewing separately obtained history, Documenting clinical information in the electronic or other health record, Independently interpreting results (not separately reported) and communicating results to the patient/family/caregiver, or Care coordination (not separately reported).      Each patient to whom he or she provides medical services by telemedicine is:  (1) informed of the relationship between the physician and patient and the respective role of any other health care provider with respect to management of the patient; and (2) notified that he or she may decline to receive medical services by telemedicine and may withdraw from such care at any time.     Comments: Trust 3 of 9. Patient continues to smoke 3-5 cigs/day. Patient shared she knows the patches are working and she is really motivated to stop smoking for her next medical appointment in April. Commended patient on accomplishment thus far. Pt. shared she noticed she is gaining weight and asked for interventions. Discussed and explored with patient healthy options to keep on hand to help with cravings, (sunflower seeds, carrots, fruits, and sugar-free gum or hard candy) Pt was receptive to suggestions.  Discussed and reviewed strategies, cues, and triggers with  adding 2 mg nicotine lozenge, PRN (in place of a cigarette) to aid in managing cravings. Patient consented to trying the 2 mg nicotine lozenges. Prescription sent to Pharmacy. Pt remains on tobacco cessation medication of 14 mg nicotine patch with no negative side effects at this time.  The patient will continue with therapy sessions and medication monitoring by CTTS. Prescribed medication management will be by physician.     Diagnosis: F17.200    Next Visit: 2 weeks

## 2025-03-03 ENCOUNTER — TELEPHONE (OUTPATIENT)
Dept: SMOKING CESSATION | Facility: CLINIC | Age: 65
End: 2025-03-03
Payer: MEDICAID

## 2025-03-03 NOTE — TELEPHONE ENCOUNTER
Called patient for missed smoking cessation follow-up appointment. Unable to leave message, mailbox full.

## 2025-04-23 ENCOUNTER — CLINICAL SUPPORT (OUTPATIENT)
Dept: SMOKING CESSATION | Facility: CLINIC | Age: 65
End: 2025-04-23
Payer: COMMERCIAL

## 2025-04-23 DIAGNOSIS — F17.200 NICOTINE DEPENDENCE: Primary | ICD-10-CM

## 2025-04-23 PROCEDURE — 99407 BEHAV CHNG SMOKING > 10 MIN: CPT | Mod: S$GLB,,,

## 2025-04-23 NOTE — PROGRESS NOTES
Spoke with patient today in regard to smoking cessation progress for 3 month telephone follow up, she states not tobacco free. Patient has scheduled an appointment to return to the program for Quit #3 on 4/24/2025 via telephone. Informed patient of benefit period, future follow ups, and contact information if any further help or support is needed. Will resolve episode and complete smart form for Quit attempt #2.

## 2025-04-24 ENCOUNTER — CLINICAL SUPPORT (OUTPATIENT)
Dept: SMOKING CESSATION | Facility: CLINIC | Age: 65
End: 2025-04-24
Payer: COMMERCIAL

## 2025-04-24 DIAGNOSIS — F17.200 NICOTINE DEPENDENCE: Primary | ICD-10-CM

## 2025-04-24 PROCEDURE — 99403 PREV MED CNSL INDIV APPRX 45: CPT | Mod: S$GLB,,,

## 2025-04-24 PROCEDURE — 99999 PR PBB SHADOW E&M-EST. PATIENT-LVL I: CPT | Mod: PBBFAC,,,

## 2025-04-24 RX ORDER — IBUPROFEN 200 MG
1 TABLET ORAL DAILY
Qty: 28 PATCH | Refills: 0 | Status: SHIPPED | OUTPATIENT
Start: 2025-04-24

## 2025-04-24 RX ORDER — DM/P-EPHED/ACETAMINOPH/DOXYLAM 30-7.5/3
2 LIQUID (ML) ORAL
Qty: 72 LOZENGE | Refills: 0 | Status: SHIPPED | OUTPATIENT
Start: 2025-04-24

## 2025-04-24 NOTE — Clinical Note
Patient will be participating in tobacco cessation sessions and will begin prescribed tobacco cessation medication regime of 21mg nicotine patch daily, 2mg nicotine lozenge, prn (in place of a cigarette). Patient is currently smoking 10-20 cigarettes daily.

## 2025-05-09 ENCOUNTER — OFFICE VISIT (OUTPATIENT)
Dept: CARDIOLOGY | Facility: CLINIC | Age: 65
End: 2025-05-09
Payer: MEDICARE

## 2025-05-09 VITALS — SYSTOLIC BLOOD PRESSURE: 138 MMHG | HEART RATE: 94 BPM | OXYGEN SATURATION: 97 % | DIASTOLIC BLOOD PRESSURE: 56 MMHG

## 2025-05-09 DIAGNOSIS — I25.118 ATHEROSCLEROSIS OF NATIVE CORONARY ARTERY OF NATIVE HEART WITH STABLE ANGINA PECTORIS: Primary | ICD-10-CM

## 2025-05-09 DIAGNOSIS — I65.22 STENOSIS OF LEFT CAROTID ARTERY: ICD-10-CM

## 2025-05-09 DIAGNOSIS — I10 PRIMARY HYPERTENSION: ICD-10-CM

## 2025-05-09 DIAGNOSIS — E78.00 HYPERCHOLESTEROLEMIA: ICD-10-CM

## 2025-05-09 PROCEDURE — 99999 PR PBB SHADOW E&M-EST. PATIENT-LVL IV: CPT | Mod: PBBFAC,,, | Performed by: INTERNAL MEDICINE

## 2025-05-09 NOTE — PROGRESS NOTES
[facility]    Chief Complaint  Chief Complaint   Patient presents with    Follow-up       HPI:    She has started smoking again because she was concerned about weight gain.  Mounjaro did not help with weight loss.  No longer seeing Dr. Martinez for her carotid stenosis as he is no longer practicing.  No exertional dyspnea or chest discomfort.    Medications  Current Medications[1]     History  Past Medical History:   Diagnosis Date    Atherosclerotic peripheral vascular disease     Carotid stenosis     Coronary atherosclerosis     RCA-    Diabetes mellitus, type 2     Diverticulitis large intestine 05/2014    Hypercholesterolemia     Hypertension     Trochanteric bursitis of left hip 5/9/2023     Past Surgical History:   Procedure Laterality Date    ILIAC ARTERY STENT Left 02/24/2010    Maria T 8 x 29 mm    ILIAC ARTERY STENT Right 02/17/2010    Maria T 8 x 60 mm    TUBAL LIGATION      TYMPANOTOMY  11/2018     Social History[2]  Family History   Problem Relation Name Age of Onset    Coronary artery disease Mother      Coronary artery disease Father      Diabetes Father      Hypertension Sister      Hypertension Brother          Allergies  Review of patient's allergies indicates:  No Known Allergies    Review of Systems   Review of Systems   Constitutional: Negative for malaise/fatigue, weight gain and weight loss.   Eyes:  Negative for visual disturbance.   Cardiovascular:  Negative for chest pain, claudication, cyanosis, dyspnea on exertion, irregular heartbeat, leg swelling, near-syncope, orthopnea, palpitations, paroxysmal nocturnal dyspnea and syncope.   Respiratory:  Negative for cough, hemoptysis, shortness of breath, sleep disturbances due to breathing and wheezing.    Hematologic/Lymphatic: Negative for bleeding problem. Does not bruise/bleed easily.   Skin:  Negative for poor wound healing.   Musculoskeletal:  Positive for back pain. Negative for muscle cramps and myalgias.    Gastrointestinal:  Negative for abdominal pain, anorexia, diarrhea, heartburn, hematemesis, hematochezia, melena, nausea and vomiting.   Genitourinary:  Negative for hematuria and nocturia.   Neurological:  Negative for excessive daytime sleepiness, dizziness, focal weakness, light-headedness and weakness.       Physical Exam  Vitals:    05/09/25 1334   BP: (!) 138/56   Pulse: 94     Wt Readings from Last 1 Encounters:   05/09/25 (P) 79 kg (174 lb 3.2 oz)     Physical Exam  Constitutional:       General: She is not in acute distress.     Appearance: She is not toxic-appearing or diaphoretic.   HENT:      Head: Normocephalic and atraumatic.   Eyes:      General: No scleral icterus.     Conjunctiva/sclera: Conjunctivae normal.   Neck:      Thyroid: No thyromegaly.      Vascular: No hepatojugular reflux or JVD.      Trachea: No tracheal deviation.   Cardiovascular:      Rate and Rhythm: Normal rate and regular rhythm. No extrasystoles are present.     Chest Wall: PMI is not displaced.      Pulses:           Carotid pulses are 2+ on the right side and 2+ on the left side with bruit.       Radial pulses are 2+ on the right side and 2+ on the left side.        Dorsalis pedis pulses are 1+ on the right side and 1+ on the left side.        Posterior tibial pulses are 1+ on the right side and 1+ on the left side.      Heart sounds: S1 normal and S2 normal. No murmur heard.     Gallop present. S4 sounds present. No S3 sounds.   Pulmonary:      Effort: No accessory muscle usage or respiratory distress.      Breath sounds: No decreased breath sounds, wheezing, rhonchi or rales.   Abdominal:      General: Bowel sounds are normal. There is abdominal bruit.      Palpations: Abdomen is soft. There is no hepatomegaly, splenomegaly or pulsatile mass.      Tenderness: There is no abdominal tenderness.   Musculoskeletal:         General: No tenderness or deformity.      Cervical back: Neck supple.   Skin:     General: Skin is warm  and dry.      Coloration: Skin is not pale.      Nails: There is no clubbing.   Neurological:      Mental Status: She is alert and oriented to person, place, and time.      Cranial Nerves: No cranial nerve deficit.      Sensory: No sensory deficit.   Psychiatric:         Speech: Speech normal.         Behavior: Behavior normal. Behavior is cooperative.         Labs  No visits with results within 6 Month(s) from this visit.   Latest known visit with results is:   Office Visit on 04/26/2024   Component Date Value Ref Range Status    QRS Duration 04/26/2024 84  ms Final    [facility] QTC Calculation 04/26/2024 448  ms Final       Imaging  No results found.    Assessment  1. Atherosclerosis of native coronary artery of native heart with stable angina pectoris   Stable on medical therapy    2. Hypercholesterolemia  Tolerating Repatha.  Re-evaluate  - Lipid Panel; Future  - Comprehensive Metabolic Panel; Future    3. Primary hypertension  Controlled    4. Stenosis of left carotid artery  Referred to establish with a new vascular surgeon      Plan and Discussion    Encouraged to try to stop smoking again.  Check lipids.  No change present cardiac management.    The 10-year ASCVD risk score (Gorge DK, et al., 2019) is: 16.8%    Values used to calculate the score:      Age: 65 years      Sex: Female      Is Non- : Yes      Diabetic: No      Tobacco smoker: Yes      Systolic Blood Pressure: 138 mmHg      Is BP treated: Yes      HDL Cholesterol: 51 mg/dL      Total Cholesterol: 140 mg/dL     Follow Up  Follow up in about 6 months (around 11/9/2025).      Maurizio Kraus MD         [1]   Current Outpatient Medications   Medication Sig Dispense Refill    aspirin (ECOTRIN) 81 MG EC tablet Take 81 mg by mouth once daily.      cyclobenzaprine (FLEXERIL) 10 MG tablet       evolocumab (REPATHA SYRINGE) 140 mg/mL Syrg Inject 140 mg into the skin.      losartan-hydrochlorothiazide 100-12.5 mg (HYZAAR) 100-12.5 mg Tab  Take 1 tablet by mouth once daily.      nicotine (NICODERM CQ) 14 mg/24 hr Place 1 patch onto the skin once daily. Change location daily 28 patch 0    nicotine (NICODERM CQ) 21 mg/24 hr Place 1 patch onto the skin once daily. Change location of patch daily 28 patch 0    nicotine polacrilex 2 MG Lozg Take 1 lozenge (2 mg total) by mouth as needed (1 per hour in place of a cigarette, as needed, in place of a cigarette. Limit to 10 a day). 72 lozenge 0    oxyCODONE-acetaminophen (PERCOCET)  mg per tablet daily as needed.      REPATHA SURECLICK 140 mg/mL PnIj INJECT 1 PEN SUBCUTANEOUSLY  EVERY 2 WEEKS 2 mL 11    tirzepatide (MOUNJARO) 2.5 mg/0.5 mL PnIj Inject into the skin every 7 days.      zolpidem (AMBIEN) 10 mg Tab Take 10 mg by mouth once daily.      cefpodoxime (VANTIN) 200 MG tablet  (Patient not taking: Reported on 4/26/2024)      HYDROcodone-acetaminophen (NORCO) 5-325 mg per tablet hydrocodone 5 mg-acetaminophen 325 mg tablet   TAKE 1 TABLET BY MOUTH EVERY 8 HOURS AS NEEDED AS NEEDED FOR PAIN (Patient not taking: Reported on 7/5/2024)      methIMAzole (TAPAZOLE) 5 MG Tab methimazole 5 mg tablet   TAKE 1 TABLET BY MOUTH TWICE DAILY (Patient not taking: Reported on 7/5/2024)      naproxen (NAPROSYN) 500 MG tablet Take 1 tablet (500 mg total) by mouth 2 (two) times daily with meals. (Patient not taking: Reported on 1/10/2024) 60 tablet 0     No current facility-administered medications for this visit.   [2]   Social History  Socioeconomic History    Marital status:    Tobacco Use    Smoking status: Every Day     Current packs/day: 0.00     Types: Cigarettes     Last attempt to quit: 11/2/2021     Years since quitting: 3.5    Smokeless tobacco: Never   Substance and Sexual Activity    Alcohol use: Yes    Drug use: Never    Sexual activity: Not Currently     Social Drivers of Health     Financial Resource Strain: Low Risk  (5/9/2025)    Overall Financial Resource Strain (CARDIA)     Difficulty of  Paying Living Expenses: Not hard at all   Food Insecurity: Food Insecurity Present (5/9/2025)    Hunger Vital Sign     Worried About Running Out of Food in the Last Year: Sometimes true     Ran Out of Food in the Last Year: Sometimes true   Transportation Needs: No Transportation Needs (5/9/2025)    PRAPARE - Transportation     Lack of Transportation (Medical): No     Lack of Transportation (Non-Medical): No   Physical Activity: Insufficiently Active (5/9/2025)    Exercise Vital Sign     Days of Exercise per Week: 1 day     Minutes of Exercise per Session: 10 min   Stress: No Stress Concern Present (5/9/2025)    [employer] - Occupational Stress Questionnaire     Feeling of Stress : Only a little   Housing Stability: High Risk (5/9/2025)    Housing Stability Vital Sign     Unable to Pay for Housing in the Last Year: Yes     Number of Times Moved in the Last Year: 0     Homeless in the Last Year: No

## 2025-05-13 ENCOUNTER — TELEPHONE (OUTPATIENT)
Dept: CARDIOLOGY | Facility: CLINIC | Age: 65
End: 2025-05-13
Payer: MEDICARE

## 2025-05-13 NOTE — TELEPHONE ENCOUNTER
----- Message from Nancy sent at 5/13/2025  3:25 PM CDT -----  Type:  Needs Medical AdviceWho Called: ANNALISA MCCORMACK [[id number]]Would the patient rather a call back or a response via [facility]? Call back Best Call Back Number: [phone] Additional Information: Patient states she would like to speak with the providers office regarding medical clearance for an upcoming procedure. Patient would like a call back with further assistance.

## 2025-05-14 ENCOUNTER — TELEPHONE (OUTPATIENT)
Dept: VASCULAR SURGERY | Facility: CLINIC | Age: 65
End: 2025-05-14
Payer: MEDICARE

## 2025-05-14 DIAGNOSIS — I65.23 BILATERAL CAROTID ARTERY STENOSIS: Primary | ICD-10-CM

## 2025-05-14 NOTE — TELEPHONE ENCOUNTER
Contacted pt to schedule appt from referral for carotid artery stenosis by Dr. Kraus. Appointments scheduled, pt verified. Appointment letter refused.

## 2025-05-15 ENCOUNTER — CLINICAL SUPPORT (OUTPATIENT)
Dept: SMOKING CESSATION | Facility: CLINIC | Age: 65
End: 2025-05-15
Payer: COMMERCIAL

## 2025-05-15 ENCOUNTER — TELEPHONE (OUTPATIENT)
Dept: SMOKING CESSATION | Facility: CLINIC | Age: 65
End: 2025-05-15
Payer: MEDICARE

## 2025-05-15 DIAGNOSIS — F17.200 NICOTINE DEPENDENCE: Primary | ICD-10-CM

## 2025-05-15 NOTE — PROGRESS NOTES
Individual Follow-Up Form    5/15/2025    Quit Date:     Clinical Status of Patient: Outpatient    Length of Service: 30 minutes    Continuing Medication: yes  Patches or Nicotine Lozenges    Other Medications:      Target Symptoms: Withdrawal and medication side effects. The following were  rated moderate (3) to severe (4) on TCRS:  Moderate (3): none  Severe (4): none    The patient location is:[place], [place]  The chief complaint leading to consultation is: Nicotine dependence    Visit type: audiovisual    Face to Face time with patient:   30 minutes of total time spent on the encounter, which includes face to face time and non-face to face time preparing to see the patient (eg, review of tests), Obtaining and/or reviewing separately obtained history, Documenting clinical information in the electronic or other health record, Independently interpreting results (not separately reported) and communicating results to the patient/family/caregiver, or Care coordination (not separately reported).     Each patient to whom he or she provides medical services by telemedicine is:  (1) informed of the relationship between the physician and patient and the respective role of any other health care provider with respect to management of the patient; and (2) notified that he or she may decline to receive medical services by telemedicine and may withdraw from such care at any time.     Comments: Trust # 2 of 9. Patient states she is not smoking, after bad experience with medication. Pt reports her medication dosage was increased, which caused significant nausea. Pt reports she has been wearing patches to help with cravings and withdrawals. Commended patient on the accomplishment thus far. Completion of TCRS (Tobacco Cessation Rating Scale) Discussed and reviewed strategies, cues, and triggers with interventions. Pt  remains on prescribed tobacco cessation medication regimen of 21 mg patch and 2 mg nicotine lozenge without any  negative side effects at this time. No refill needed at this time. The pt denies any abnormal behavioral or mental changes at this time. Pt  will continue with therapy sessions and medication monitoring by CTTS. Prescribed medication management will be by physician. Pt understands to call CTTS if anything is needed before the next visit.    Diagnosis: F17.200    Next Visit: 3 weeks

## 2025-05-15 NOTE — Clinical Note
Trust # 2 of 9. Patient states she is not smoking, after bad experience with medication. Pt reports her medication dosage was increased, which caused significant nausea. Pt reports she has been wearing patches to help with cravings and withdrawals. Commended patient on the accomplishment thus far. Completion of TCRS (Tobacco Cessation Rating Scale) Discussed and reviewed strategies, cues, and triggers with interventions. Pt  remains on prescribed tobacco cessation medication regimen of 21 mg patch and 2 mg nicotine lozenge without any negative side effects at this time. No refill needed at this time. The pt denies any abnormal behavioral or mental changes at this time. Pt  will continue with therapy sessions and medication monitoring by CTTS. Prescribed medication management will be by physician. Pt understands to call CTTS if anything is needed before the next visit.

## 2025-06-11 ENCOUNTER — HOSPITAL ENCOUNTER (OUTPATIENT)
Dept: VASCULAR SURGERY | Facility: CLINIC | Age: 65
Discharge: HOME | End: 2025-06-11
Attending: SURGERY
Payer: MEDICARE

## 2025-06-11 ENCOUNTER — INITIAL CONSULT (OUTPATIENT)
Dept: VASCULAR SURGERY | Facility: CLINIC | Age: 65
End: 2025-06-11
Attending: SURGERY
Payer: MEDICARE

## 2025-06-11 VITALS
TEMPERATURE: 98 F | DIASTOLIC BLOOD PRESSURE: 66 MMHG | BODY MASS INDEX: 29.69 KG/M2 | SYSTOLIC BLOOD PRESSURE: 123 MMHG | HEART RATE: 88 BPM | HEIGHT: 63 IN | WEIGHT: 167.56 LBS

## 2025-06-11 DIAGNOSIS — I65.23 BILATERAL CAROTID ARTERY STENOSIS: ICD-10-CM

## 2025-06-11 DIAGNOSIS — I65.22 STENOSIS OF LEFT CAROTID ARTERY: ICD-10-CM

## 2025-06-11 PROBLEM — N39.0 UTI (URINARY TRACT INFECTION), BACTERIAL: Status: ACTIVE | Noted: 2021-12-03

## 2025-06-11 PROBLEM — I65.29 CAROTID ATHEROSCLEROSIS: Status: ACTIVE | Noted: 2025-01-12

## 2025-06-11 PROBLEM — N30.00 ACUTE CYSTITIS WITHOUT HEMATURIA: Status: ACTIVE | Noted: 2021-10-18

## 2025-06-11 PROBLEM — A49.9 UTI (URINARY TRACT INFECTION), BACTERIAL: Status: ACTIVE | Noted: 2021-12-03

## 2025-06-11 PROBLEM — M51.26 DISPLACEMENT OF LUMBAR INTERVERTEBRAL DISC WITHOUT MYELOPATHY: Status: ACTIVE | Noted: 2018-05-14

## 2025-06-11 PROBLEM — T40.2X5A THERAPEUTIC OPIOID INDUCED CONSTIPATION: Status: ACTIVE | Noted: 2024-12-01

## 2025-06-11 PROBLEM — G89.29 OTHER CHRONIC PAIN: Status: ACTIVE | Noted: 2025-05-29

## 2025-06-11 PROBLEM — I10 ESSENTIAL (PRIMARY) HYPERTENSION: Status: ACTIVE | Noted: 2021-10-18

## 2025-06-11 PROBLEM — F17.200 NICOTINE DEPENDENCE: Status: ACTIVE | Noted: 2024-12-01

## 2025-06-11 PROBLEM — K59.03 THERAPEUTIC OPIOID INDUCED CONSTIPATION: Status: ACTIVE | Noted: 2024-12-01

## 2025-06-11 PROBLEM — M54.9 DORSALGIA, UNSPECIFIED: Status: ACTIVE | Noted: 2021-10-18

## 2025-06-11 PROBLEM — I51.9 HEART DISEASE, UNSPECIFIED: Status: ACTIVE | Noted: 2021-10-18

## 2025-06-11 PROBLEM — R93.3 ABNORMAL FINDING ON GI TRACT IMAGING: Status: ACTIVE | Noted: 2025-06-11

## 2025-06-11 PROBLEM — E78.5 HYPERLIPIDEMIA, UNSPECIFIED: Status: ACTIVE | Noted: 2021-10-18

## 2025-06-11 PROBLEM — M19.90 UNSPECIFIED OSTEOARTHRITIS, UNSPECIFIED SITE: Status: ACTIVE | Noted: 2021-10-18

## 2025-06-11 PROBLEM — M47.812 CERVICAL SPONDYLOSIS: Status: ACTIVE | Noted: 2024-11-03

## 2025-06-11 PROBLEM — S62.317A: Status: ACTIVE | Noted: 2025-05-28

## 2025-06-11 PROCEDURE — 99999 PR PBB SHADOW E&M-EST. PATIENT-LVL IV: CPT | Mod: PBBFAC,,, | Performed by: SURGERY

## 2025-06-11 NOTE — PROGRESS NOTES
VASCULAR SURGERY NOTE    Patient ID: Lina Farmer is a 65 y.o. female.    I. HISTORY     Chief Complaint: Common Carotid artery steosis    HPI: Lina Farmer is a 65 y.o. female who is here today for new patient initial appointment. She is referred here from her PCP Dr. Kraus. She has a previous history PAD s/p bilateral stents in , , DM2, HTN, HLD, and known bilateral common carotid artery stenosis, previously known to be 70-80 in the left common carotid and 50-69% in the right common carotid. She underwent a CTA Head and Neck at [facility] in 2024 but we cannot see those images. Per the read there was localized narrowing of the mid left common carotid artery. The patient does not have the CD either. This morning she had a doppler ultrasound done bilaterally which demonstrated unchanged disease, per the read. Awaiting the pictures to be uploaded.    She reports her house is 9 ft off the ground so she is constantly mobilizing. She recently fell and broke her left wrist but reports this incident was due to the length of her ball gown. She does all her daily acts of living on her own. She denies any symptoms of neurological deficits, headaches, dizziness, blurred vision.     She is having surgery for eyelid correction at the end of the month.     She has resumed smoking again, back to 1 pack a day.    ALLERGIES: NKA    SOCIAL HISTORY: 38 years, 1 ppd. She does not drink.    FAMILY HISTORY: CHF in her mother, father, and grandfather. Several siblings have  of atherosclerotic disease    MEDICATIONS: losartan, stopped taking ASA ahead of surgery, she stopped taking plavix due to side effects    Past Medical History:   Diagnosis Date    Atherosclerotic peripheral vascular disease     Carotid stenosis     Coronary atherosclerosis     RCA-    Diabetes mellitus, type 2     Diverticulitis large intestine 2014    Hypercholesterolemia     Hypertension     Trochanteric bursitis of left hip  5/9/2023        Past Surgical History:   Procedure Laterality Date    ILIAC ARTERY STENT Left 02/24/2010    Maria T 8 x 29 mm    ILIAC ARTERY STENT Right 02/17/2010    Maria T 8 x 60 mm    TUBAL LIGATION      TYMPANOTOMY  11/2018       Social History     Occupational History    Not on file   Tobacco Use    Smoking status: Every Day     Current packs/day: 0.00     Types: Cigarettes     Last attempt to quit: 11/2/2021     Years since quitting: 3.6    Smokeless tobacco: Never   Substance and Sexual Activity    Alcohol use: Yes    Drug use: Never    Sexual activity: Not Currently         Review of Systems   Cardiovascular:  Negative for chest pain, claudication, leg swelling and syncope.   Neurological:  Negative for dizziness, focal weakness, headaches, light-headedness, numbness, sensory change and weakness.       II. PHYSICAL EXAM     Physical Exam  Constitutional:       Appearance: Normal appearance.   Cardiovascular:      Rate and Rhythm: Normal rate.      Pulses: Normal pulses.   Pulmonary:      Effort: Pulmonary effort is normal.   Musculoskeletal:         General: No swelling, tenderness, deformity or signs of injury.      Cervical back: Normal range of motion.   Skin:     General: Skin is warm.      Capillary Refill: Capillary refill takes less than 2 seconds.   Neurological:      Mental Status: She is alert and oriented to person, place, and time.      Cranial Nerves: Cranial nerves 2-12 are intact.      Motor: No weakness or tremor.   VASC:  RLE: 3+  LLE: 3+  Rradial 3+  Lradial: 3+    III. ASSESSMENT & PLAN (MEDICAL DECISION MAKING)       Imaging Results: (I have personally reviewed all images and provided interpretation below)   Carotid Duplex: for ICA  R L   1-49% 1-49%    CCA: L > 50%  CT: not reviewed      Assessment/Diagnosis and Plan:    1. Stenosis of left carotid artery      Greater stenosis is L> R CCA    65 y.o. female with known history of stable left common carotid artery stenosis.  Currently managed medically. No symptoms. Ms. Farmer is undergoing a lot of stress at this time taking care of her grandson alone, while taking care of her own health. At this time we recommend continuing to medically manage her vascular disease.  In the meantime, we will upload her old scans to our PACS.    Plan to return to clinic in 6 months with CTA of head and neck.    Rafat Pack MD

## 2025-06-20 ENCOUNTER — PATIENT MESSAGE (OUTPATIENT)
Dept: VASCULAR SURGERY | Facility: CLINIC | Age: 65
End: 2025-06-20
Payer: MEDICARE

## 2025-06-20 ENCOUNTER — TELEPHONE (OUTPATIENT)
Dept: VASCULAR SURGERY | Facility: CLINIC | Age: 65
End: 2025-06-20
Payer: MEDICARE

## 2025-06-20 NOTE — TELEPHONE ENCOUNTER
Attempted to obtain images for CTA10/01/24 and 11/01/22 from [facility] but unsuccessful.Spoke with Kirsten at the imaging center who verbalized that she needed an authorization to powershare images to this facility.

## 2025-06-20 NOTE — TELEPHONE ENCOUNTER
Spoke to pt. Advised we're unable to request images from [facility] without a signature. Asked if she preferred to come in here to sign the auth form or if she wanted to contact [facility] directly to request the images. Pt stated she's out of town currently. She asked that I send her a message with the info she needs to request images through [employer] from [facility]. Advised I will send her a message and she can update me once she contacts [facility]. Patient verbalized understanding and had no further questions.

## 2025-07-11 ENCOUNTER — TELEPHONE (OUTPATIENT)
Dept: VASCULAR SURGERY | Facility: CLINIC | Age: 65
End: 2025-07-11
Payer: MEDICARE

## 2025-07-11 DIAGNOSIS — Z01.818 PREOP TESTING: ICD-10-CM

## 2025-07-11 DIAGNOSIS — I65.22 STENOSIS OF LEFT CAROTID ARTERY: Primary | ICD-10-CM

## 2025-07-11 RX ORDER — PREDNISONE 50 MG/1
TABLET ORAL
Qty: 3 TABLET | Refills: 0 | Status: CANCELLED | OUTPATIENT
Start: 2025-07-11

## 2025-07-11 RX ORDER — DIPHENHYDRAMINE HCL 50 MG
CAPSULE ORAL
Qty: 1 CAPSULE | Refills: 0 | Status: CANCELLED | OUTPATIENT
Start: 2025-07-11

## 2025-07-11 NOTE — TELEPHONE ENCOUNTER
----- Message from Alejandro Brown MD sent at 7/11/2025  9:52 AM CDT -----  Please repeat Cr and needs CTA.  Thanks.  ----- Message -----  From: Paula Schwartz RN  Sent: 7/11/2025   9:50 AM CDT  To: Cat Santos LPN; Alejandro Brown,#    Hey, you wanted a repeat CTA head and neck for this pt in 6 months. Her creatinine was 1.24 on 5/19/25. Just want to confirm you're ok with proceeding. She stated she was vomiting a lot around that time from taking Mounjaro. Did you want to repeat her creatinine closer to the time of the repeat imaging?

## 2025-07-11 NOTE — TELEPHONE ENCOUNTER
Spoke to pt. Advised we submitted the request to pull the [employer] images from [facility]. Told her it typically takes a couple days before it shows on our side. Advised Dr. Brown wants to see her back in December with a repeat CTA head and neck. Pt scheduled for repeat creatinine on 12/4/25, CTA head and neck on 12/8/25, and appt with Dr. Brown on 12/10/25. Pt advised to fast 4 hours prior to CTA. Appt letter to be mailed. Patient verbalized understanding and had no further questions.

## 2025-07-16 ENCOUNTER — HOSPITAL ENCOUNTER (EMERGENCY)
Facility: HOSPITAL | Age: 65
Discharge: HOME | End: 2025-07-16
Attending: EMERGENCY MEDICINE
Payer: MEDICARE

## 2025-07-16 ENCOUNTER — CLINICAL SUPPORT (OUTPATIENT)
Dept: SMOKING CESSATION | Facility: CLINIC | Age: 65
End: 2025-07-16
Payer: COMMERCIAL

## 2025-07-16 ENCOUNTER — TELEPHONE (OUTPATIENT)
Dept: VASCULAR SURGERY | Facility: CLINIC | Age: 65
End: 2025-07-16
Payer: MEDICARE

## 2025-07-16 VITALS
SYSTOLIC BLOOD PRESSURE: 175 MMHG | OXYGEN SATURATION: 100 % | DIASTOLIC BLOOD PRESSURE: 87 MMHG | HEART RATE: 86 BPM | BODY MASS INDEX: 25.46 KG/M2 | RESPIRATION RATE: 16 BRPM | WEIGHT: 168 LBS | HEIGHT: 68 IN | TEMPERATURE: 98 F

## 2025-07-16 DIAGNOSIS — F17.200 NICOTINE DEPENDENCE: Primary | ICD-10-CM

## 2025-07-16 DIAGNOSIS — R51.9 ACUTE NONINTRACTABLE HEADACHE, UNSPECIFIED HEADACHE TYPE: Primary | ICD-10-CM

## 2025-07-16 DIAGNOSIS — R42 DIZZINESS: ICD-10-CM

## 2025-07-16 DIAGNOSIS — I10 HYPERTENSION, UNSPECIFIED TYPE: ICD-10-CM

## 2025-07-16 LAB
ABSOLUTE EOSINOPHIL (OHS): 0.04 K/UL
ABSOLUTE MONOCYTE (OHS): 0.84 K/UL (ref 0.3–1)
ABSOLUTE NEUTROPHIL COUNT (OHS): 4.02 K/UL (ref 1.8–7.7)
ALBUMIN SERPL BCP-MCNC: 4 G/DL (ref 3.5–5.2)
ALP SERPL-CCNC: 86 UNIT/L (ref 40–150)
ALT SERPL W/O P-5'-P-CCNC: 12 UNIT/L (ref 10–44)
ANION GAP (OHS): 9 MMOL/L (ref 8–16)
AST SERPL-CCNC: 19 UNIT/L (ref 11–45)
BASOPHILS # BLD AUTO: 0.03 K/UL
BASOPHILS NFR BLD AUTO: 0.5 %
BILIRUB SERPL-MCNC: 0.4 MG/DL (ref 0.1–1)
BILIRUB UR QL STRIP.AUTO: NEGATIVE
BUN SERPL-MCNC: 10 MG/DL (ref 8–23)
CALCIUM SERPL-MCNC: 10.2 MG/DL (ref 8.7–10.5)
CHLORIDE SERPL-SCNC: 105 MMOL/L (ref 95–110)
CLARITY UR: ABNORMAL
CO2 SERPL-SCNC: 24 MMOL/L (ref 23–29)
COLOR UR AUTO: YELLOW
CREAT SERPL-MCNC: 1 MG/DL (ref 0.5–1.4)
ERYTHROCYTE [DISTWIDTH] IN BLOOD BY AUTOMATED COUNT: 12.2 % (ref 11.5–14.5)
GFR SERPLBLD CREATININE-BSD FMLA CKD-EPI: >60 ML/MIN/1.73/M2
GLUCOSE SERPL-MCNC: 97 MG/DL (ref 70–110)
GLUCOSE UR QL STRIP: NEGATIVE
HCT VFR BLD AUTO: 40.6 % (ref 37–48.5)
HCV AB SERPL QL IA: NORMAL
HGB BLD-MCNC: 13.6 GM/DL (ref 12–16)
HGB UR QL STRIP: NEGATIVE
HIV 1+2 AB+HIV1 P24 AG SERPL QL IA: NORMAL
IMM GRANULOCYTES # BLD AUTO: 0.02 K/UL (ref 0–0.04)
IMM GRANULOCYTES NFR BLD AUTO: 0.3 % (ref 0–0.5)
KETONES UR QL STRIP: NEGATIVE
LEUKOCYTE ESTERASE UR QL STRIP: NEGATIVE
LYMPHOCYTES # BLD AUTO: 0.9 K/UL (ref 1–4.8)
MCH RBC QN AUTO: 30.2 PG (ref 27–31)
MCHC RBC AUTO-ENTMCNC: 33.5 G/DL (ref 32–36)
MCV RBC AUTO: 90 FL (ref 82–98)
NITRITE UR QL STRIP: NEGATIVE
NUCLEATED RBC (/100WBC) (OHS): 0 /100 WBC
OHS QRS DURATION: 82 MS
OHS QTC CALCULATION: 449 MS
PH UR STRIP: 7 [PH]
PLATELET # BLD AUTO: 222 K/UL (ref 150–450)
PMV BLD AUTO: 9 FL (ref 9.2–12.9)
POTASSIUM SERPL-SCNC: 3.7 MMOL/L (ref 3.5–5.1)
PROT SERPL-MCNC: 7.3 GM/DL (ref 6–8.4)
PROT UR QL STRIP: NEGATIVE
RBC # BLD AUTO: 4.5 M/UL (ref 4–5.4)
RELATIVE EOSINOPHIL (OHS): 0.7 %
RELATIVE LYMPHOCYTE (OHS): 15.4 % (ref 18–48)
RELATIVE MONOCYTE (OHS): 14.4 % (ref 4–15)
RELATIVE NEUTROPHIL (OHS): 68.7 % (ref 38–73)
SODIUM SERPL-SCNC: 138 MMOL/L (ref 136–145)
SP GR UR STRIP: 1.01
TROPONIN I SERPL HS-MCNC: <3 NG/L
UROBILINOGEN UR STRIP-ACNC: NEGATIVE EU/DL
WBC # BLD AUTO: 5.85 K/UL (ref 3.9–12.7)

## 2025-07-16 PROCEDURE — 93005 ELECTROCARDIOGRAM TRACING: CPT

## 2025-07-16 PROCEDURE — 96374 THER/PROPH/DIAG INJ IV PUSH: CPT

## 2025-07-16 PROCEDURE — 85025 COMPLETE CBC W/AUTO DIFF WBC: CPT

## 2025-07-16 PROCEDURE — 82040 ASSAY OF SERUM ALBUMIN: CPT

## 2025-07-16 PROCEDURE — 81003 URINALYSIS AUTO W/O SCOPE: CPT

## 2025-07-16 PROCEDURE — 96375 TX/PRO/DX INJ NEW DRUG ADDON: CPT

## 2025-07-16 PROCEDURE — 99285 EMERGENCY DEPT VISIT HI MDM: CPT | Mod: 25

## 2025-07-16 PROCEDURE — 63600175 PHARM REV CODE 636 W HCPCS

## 2025-07-16 PROCEDURE — 87389 HIV-1 AG W/HIV-1&-2 AB AG IA: CPT | Performed by: EMERGENCY MEDICINE

## 2025-07-16 PROCEDURE — 99407 BEHAV CHNG SMOKING > 10 MIN: CPT | Mod: S$GLB,,,

## 2025-07-16 PROCEDURE — 96361 HYDRATE IV INFUSION ADD-ON: CPT

## 2025-07-16 PROCEDURE — 93010 ELECTROCARDIOGRAM REPORT: CPT | Mod: ,,, | Performed by: INTERNAL MEDICINE

## 2025-07-16 PROCEDURE — 86803 HEPATITIS C AB TEST: CPT | Performed by: EMERGENCY MEDICINE

## 2025-07-16 PROCEDURE — 84484 ASSAY OF TROPONIN QUANT: CPT

## 2025-07-16 PROCEDURE — 99999 PR PBB SHADOW E&M-EST. PATIENT-LVL I: CPT | Mod: PBBFAC,,,

## 2025-07-16 PROCEDURE — 25000003 PHARM REV CODE 250: Performed by: EMERGENCY MEDICINE

## 2025-07-16 RX ORDER — DIPHENHYDRAMINE HYDROCHLORIDE 50 MG/ML
12.5 INJECTION, SOLUTION INTRAMUSCULAR; INTRAVENOUS
Status: COMPLETED | OUTPATIENT
Start: 2025-07-16 | End: 2025-07-16

## 2025-07-16 RX ORDER — PROCHLORPERAZINE EDISYLATE 5 MG/ML
10 INJECTION INTRAMUSCULAR; INTRAVENOUS
Status: COMPLETED | OUTPATIENT
Start: 2025-07-16 | End: 2025-07-16

## 2025-07-16 RX ORDER — MORPHINE SULFATE 4 MG/ML
4 INJECTION, SOLUTION INTRAMUSCULAR; INTRAVENOUS
Refills: 0 | Status: DISCONTINUED | OUTPATIENT
Start: 2025-07-16 | End: 2025-07-16 | Stop reason: HOSPADM

## 2025-07-16 RX ORDER — LOSARTAN POTASSIUM AND HYDROCHLOROTHIAZIDE 12.5; 1 MG/1; MG/1
1 TABLET ORAL
Status: DISCONTINUED | OUTPATIENT
Start: 2025-07-16 | End: 2025-07-16

## 2025-07-16 RX ORDER — HYDROCHLOROTHIAZIDE 12.5 MG/1
12.5 TABLET ORAL
Status: COMPLETED | OUTPATIENT
Start: 2025-07-16 | End: 2025-07-16

## 2025-07-16 RX ORDER — LOSARTAN POTASSIUM 50 MG/1
100 TABLET ORAL
Status: COMPLETED | OUTPATIENT
Start: 2025-07-16 | End: 2025-07-16

## 2025-07-16 RX ADMIN — DIPHENHYDRAMINE HYDROCHLORIDE 12.5 MG: 50 INJECTION, SOLUTION INTRAMUSCULAR; INTRAVENOUS at 02:07

## 2025-07-16 RX ADMIN — HYDROCHLOROTHIAZIDE 12.5 MG: 12.5 TABLET ORAL at 04:07

## 2025-07-16 RX ADMIN — SODIUM CHLORIDE, POTASSIUM CHLORIDE, SODIUM LACTATE AND CALCIUM CHLORIDE 1000 ML: 600; 310; 30; 20 INJECTION, SOLUTION INTRAVENOUS at 02:07

## 2025-07-16 RX ADMIN — LOSARTAN POTASSIUM 100 MG: 50 TABLET, FILM COATED ORAL at 04:07

## 2025-07-16 RX ADMIN — PROCHLORPERAZINE EDISYLATE 10 MG: 5 INJECTION INTRAMUSCULAR; INTRAVENOUS at 02:07

## 2025-07-16 NOTE — TELEPHONE ENCOUNTER
Received a message from pt asking to return her call.    Left message for pt asking her to call back at [phone].

## 2025-07-16 NOTE — PROGRESS NOTES
Called pt to f/u on her 3 month smoking cessation quit status. Pt stated she is still smoking, but has cut back and needs patches. Informed her she has benefits available and is able to continue. Pt is currently in the ER, told her to call back when she is better to schedule appt with [facility]. Informed her of benefit period, phone follow ups, and contact information. Will complete smart form and will continue to follow up on quit #3 episode.

## 2025-07-16 NOTE — DISCHARGE INSTRUCTIONS
Your workup was negative today.  Your CT scan of your head did not show any acute findings.  Please follow up with your primary care doctor.  If you have worsening symptoms or develop any new symptoms, report to the emergency department for evaluation.

## 2025-07-16 NOTE — PROVIDER PROGRESS NOTES - EMERGENCY DEPT.
Encounter Date: 7/16/2025    ED Physician Progress Notes         EKG - STEMI Decision  Initial Reading: No STEMI present (Normal sinus 96.  Nonspecific ST-T wave abnormalities.  No STEMI).  Response: No Action Needed.

## 2025-07-16 NOTE — ED PROVIDER NOTES
"Encounter Date: 7/16/2025       History     Chief Complaint   Patient presents with    Dizziness     Arrives via EMS from home, reports back pain 10/10 and dizziness.      65-year-old female with past medical history of type 2 diabetes, hypertension, carotid stenosis, coronary arthrosclerosis who presents to the emergency department complaining of sudden onset of frontal headache with associated dizziness and photophobia this morning.  She reports no history of headache and has not ever had a headache like this before.  Described dizziness as the room is spinning, however dizziness has subsided upon arrival to ED. She also notes history of sciatica and reports 10/10 back pain this a.m., which was improved with Percocet this morning.  She denies any fever, chills, chest pain, shortness of breath, nausea, vomiting, vision changes, numbness or tingling, focal weakness, saddle anesthesia, bowel or bladder incontinence, and any other symptoms.  Denies any recent falls or trauma.  Contrary to triage note, patient denies shortness of breath to me.         Review of patient's allergies indicates:   Allergen Reactions    Clopidogrel Other (See Comments)     Fatigue and "feeling bad"    Iodinated contrast media Rash     Can take benadryl prior     Past Medical History:   Diagnosis Date    Atherosclerotic peripheral vascular disease     Carotid stenosis     Coronary atherosclerosis     RCA-    Diabetes mellitus, type 2     Diverticulitis large intestine 05/2014    Hypercholesterolemia     Hypertension     Trochanteric bursitis of left hip 5/9/2023     Past Surgical History:   Procedure Laterality Date    ILIAC ARTERY STENT Left 02/24/2010    Maria T 8 x 29 mm    ILIAC ARTERY STENT Right 02/17/2010    Maria T 8 x 60 mm    TUBAL LIGATION      TYMPANOTOMY  11/2018     Family History   Problem Relation Name Age of Onset    Coronary artery disease Mother      Coronary artery disease Father      Diabetes Father      Hypertension " Sister      Hypertension Brother       Social History[1]  Review of Systems    Physical Exam     Initial Vitals [07/16/25 1203]   BP Pulse Resp Temp SpO2   (!) 140/70 97 16 98.1 °F (36.7 °C) 100 %      MAP       --         Physical Exam    Nursing note and vitals reviewed.  Constitutional: She appears well-developed and well-nourished. She is not diaphoretic. No distress.   HENT:   Head: Normocephalic and atraumatic. Mouth/Throat: Oropharynx is clear and moist.   Eyes: Conjunctivae and EOM are normal. Pupils are equal, round, and reactive to light.   Neck: Neck supple.   Normal range of motion.  Cardiovascular:  Normal rate, regular rhythm, normal heart sounds and intact distal pulses.           Pulmonary/Chest: Breath sounds normal. No respiratory distress. She has no wheezes. She has no rhonchi. She has no rales. She exhibits no tenderness.   Abdominal: Abdomen is soft. She exhibits no distension. There is no abdominal tenderness.   Musculoskeletal:         General: Normal range of motion.      Cervical back: Normal range of motion and neck supple.     Neurological: She is alert and oriented to person, place, and time. She has normal strength. No cranial nerve deficit or sensory deficit. GCS score is 15. GCS eye subscore is 4. GCS verbal subscore is 5. GCS motor subscore is 6.   Neurological: Awake and alert. Appropriate for age. No strength deficit; equal and 5/5 in bilateral upper and lower extremities. No acute focal neurological deficits noted.     Skin: Skin is warm and dry. Capillary refill takes less than 2 seconds.   Psychiatric: She has a normal mood and affect.         ED Course   Procedures  Labs Reviewed   URINALYSIS, REFLEX TO URINE CULTURE - Abnormal       Result Value    Color, UA Yellow      Appearance, UA Hazy (*)     pH, UA 7.0      Spec Grav UA 1.010      Protein, UA Negative      Glucose, UA Negative      Ketones, UA Negative      Bilirubin, UA Negative      Blood, UA Negative      Nitrites, UA  Negative      Urobilinogen, UA Negative      Leukocyte Esterase, UA Negative     CBC WITH DIFFERENTIAL - Abnormal    WBC 5.85      RBC 4.50      HGB 13.6      HCT 40.6      MCV 90      MCH 30.2      MCHC 33.5      RDW 12.2      Platelet Count 222      MPV 9.0 (*)     Nucleated RBC 0      Neut % 68.7      Lymph % 15.4 (*)     Mono % 14.4      Eos % 0.7      Basophil % 0.5      Imm Grans % 0.3      Neut # 4.02      Lymph # 0.90 (*)     Mono # 0.84      Eos # 0.04      Baso # 0.03      Imm Grans # 0.02     HEPATITIS C ANTIBODY - Normal    Hep C Ab Interp Non-Reactive     HIV 1 / 2 ANTIBODY - Normal    HIV 1/2 Ag/Ab Non-Reactive     COMPREHENSIVE METABOLIC PANEL - Normal    Sodium 138      Potassium 3.7      Chloride 105      CO2 24      Glucose 97      BUN 10      Creatinine 1.0      Calcium 10.2      Protein Total 7.3      Albumin 4.0      Bilirubin Total 0.4      ALP 86      AST 19      ALT 12      Anion Gap 9      eGFR >60     TROPONIN I HIGH SENSITIVITY - Normal    Troponin High Sensitive <3     CBC W/ AUTO DIFFERENTIAL    Narrative:     The following orders were created for panel order CBC auto differential.  Procedure                               Abnormality         Status                     ---------                               -----------         ------                     CBC with Differential[[id number]]       Abnormal            Final result                 Please view results for these tests on the individual orders.   HEP C VIRUS HOLD SPECIMEN   GREY TOP URINE HOLD        ECG Results              EKG 12-lead (Final result)        Collection Time Result Time QRS Duration OHS QTC Calculation    07/16/25 12:19:21 07/16/25 13:37:53 82 449                     Final result by Interface, Lab In [facility] (07/16/25 13:37:57)                   Narrative:    Test Reason : R42,    Vent. Rate :  96 BPM     Atrial Rate :  96 BPM     P-R Int : 162 ms          QRS Dur :  82 ms      QT Int : 356 ms       P-R-T Axes :   71  54  75 degrees    QTcB Int : 449 ms    Normal sinus rhythm  Possible Left atrial enlargement  Borderline Abnormal ECG  When compared with ECG of 26-Apr-2024 11:05,  No significant change was found  Confirmed by Stanley Guzman ([id number]) on 7/16/2025 1:37:47 PM    Referred By:            Confirmed By: Stanley Guzman                                  Imaging Results              CT Head Without Contrast (Final result)  Result time 07/16/25 16:10:29      Final result by Papo Severino MD (07/16/25 16:10:29)                   Impression:      No evidence of acute intracranial hemorrhage or parenchymal changes to indicate an acute major vascular distribution infarct.      Electronically signed by: Papo Severino MD  Date:    07/16/2025  Time:    16:10               Narrative:    EXAMINATION:  CT HEAD WITHOUT CONTRAST    CLINICAL HISTORY:  Headache, new or worsening (Age >= 50y);    TECHNIQUE:  Low dose axial CT images obtained throughout the head without the use of intravenous contrast.  Axial, sagittal and coronal reconstructions were performed.    COMPARISON:  03/13/2022    FINDINGS:  Brain parenchyma: No evidence of recent or remote major vascular distribution infarct.  No acute hemorrhage.  No significant mass effect or midline shift.    Ventricles:  No evidence of hydrocephalus.    Extra-axial spaces: No hemorrhage or focal fluid collections.    Skull: No displaced calvarial fracture.    Mastoid air cells and visualized paranasal sinuses: Essentially clear.    Other: Bilateral pseudophakia                                       Medications   morphine injection 4 mg (0 mg Intravenous Hold 7/16/25 1445)   lactated ringers bolus 1,000 mL (0 mLs Intravenous Stopped 7/16/25 1552)   prochlorperazine injection Soln 10 mg (10 mg Intravenous Given 7/16/25 1427)   diphenhydrAMINE injection 12.5 mg (12.5 mg Intravenous Given 7/16/25 1431)   losartan tablet 100 mg (100 mg Oral Given 7/16/25 1658)   hydroCHLOROthiazide tablet  12.5 mg (12.5 mg Oral Given 7/16/25 0143)     Medical Decision Making  65-year-old female with past medical history of type 2 diabetes, hypertension, carotid stenosis, coronary arthrosclerosis who presents to the emergency department complaining of sudden onset of frontal headache with associated dizziness this morning.  Physical exam as above    Based on available information and the initial assessment, the working differential diagnoses considered during this evaluation include but are not limited to hypertension emergency, electrolyte abnormality, tension/migraine headache, ICH    Obtaining labs and imaging, medicate, we will reassess    See ED course    Patient states symptoms have resolved at this time.  Patient is asymptomatic.  Headache completely resolved.  Patient is hypertensive, giving home BP meds here in ED. Patient is well-appearing and in no acute distress.  Workup largely unremarkable.  Instructed patient to follow up with primary care doctor.  Discussed with patient and family  all pertinent ED information and results. Discussed pt dx and plan of tx. Gave patient all f/u and return to the ED instructions. All questions and concerns were addressed at this time. Patient and family  expresses understanding of information and instructions, and is comfortable with plan to discharge. Pt is stable for discharge.    I discussed with patient and family that evaluation in the ED does not suggest any emergent or life threatening medical conditions requiring immediate intervention beyond what was provided in the ED, and I believe patient is safe for discharge.  Regardless, an unremarkable evaluation in the ED does not preclude the development or presence of a serious of life threatening condition. As such, it was instructed that the patient return immediately for any worsening or change in current symptoms.      Amount and/or Complexity of Data Reviewed  Labs: ordered. Decision-making details documented in ED  Course.  Radiology: ordered. Decision-making details documented in ED Course.  ECG/medicine tests: ordered. Decision-making details documented in ED Course.    Risk  Prescription drug management.               ED Course as of 07/16/25 1758   Wed Jul 16, 2025   1343 EKG 12-lead  EKG with sinus rhythm with heart rate of 96 beats per minute.  No acute ST changes [ST]   1409 CBC auto differential(!)  Absence of leukocytosis, no anemia noted [ST]   1439 Comprehensive metabolic panel  Unremarkable [ST]   1439 Troponin I High Sensitivity: <3 [ST]   1455 Patient is a 65-year-old female with past medical history diabetes, hypertension, CAD, chronic sciatica/chronic back pain that is presenting for acute on chronic back pain, headache.  As per patient, patient states that she woke up late this morning, has had worsening of her typical back pain.  Patient continues to have left lower extremity radiating sciatica pain.  Patient states that she took a Percocet and improved her typical sciatic pain.  Patient states that roughly 11:00 a.m. she had sudden onset severe frontal headache.  The patient also endorses light sensitivity.  Patient denies any history of migraines.  Patient denies any weakness, sensation changes.  Patient states that headache was severe for roughly 10-15 minutes, improving however still present at this time.  Patient otherwise denies any weakness, sensation changes.  Patient denies any fevers, chills, chest pains, shortness of breath, nausea, vomiting, diarrhea, abdominal pain, vision changes, saddle anesthesia, bowel incontinence, urinary retention, urinary incontinence.    Physical exam: Very well-appearing 65-year-old female, no distress, appropriately conversational.  Benign cardiac, respiratory, abdominal exam.  Patient has 5/5 strength bilateral upper and lower extremities.  No obvious sensation changes.  No obvious focal neurological deficits on exam.  No obvious focal cranial nerve deficits on  exam.    Plan:  CT ordered to rule out acute subarachnoid hemorrhage.  Consider sudden onset of severe headache.  Also consider possible hypertensive emergency as patient has hypertension and headache.  Otherwise patient has no focal neurological deficits.  Consider also acute exacerbation of chronic back pain.  We will treat with migraine cocktail at this time.   [RT]   1456 Attestation of Dr. Poole (Attending Physician):      I have reviewed the record and the patient care provided by the RAOUL and agree with the documented HPI, ROS, PE.  I also agree with the treatment plan and work up and I have performed an independent history / physical exam and MDM.     [RT]   1507 Urinalysis, Reflex to Urine Culture Urine, Clean Catch(!)  Without infection [ST]   1523 Re-evaluated patient, patient was sleeping on her back, SpO2 at 90%.  I sat her up and readjusted her pulse ox, SpO2 >95%.  She states she feels a lot better after Compazine and Benadryl.  CT head pending [ST]   1620 CT Head Without Contrast  No evidence of acute intracranial hemorrhage or parenchymal changes to indicate an acute major vascular distribution infarct. [ST]   1634 I re-evaluated the patient, patient states headache has been for a resolved.  She is feeling much better.  She is still hypertensive, will give home BP medication [ST]      ED Course User Index  [RT] Leon Poole MD  [ST] Elizabeth Hoyos PA-C                               Clinical Impression:  Final diagnoses:  [R42] Dizziness  [R51.9] Acute nonintractable headache, unspecified headache type (Primary)  [I10] Hypertension, unspecified type          ED Disposition Condition    Discharge Stable          ED Prescriptions    None       Follow-up Information       Follow up With Specialties Details Why Contact Info    Kimmy Kraus MD Family Medicine Schedule an appointment as soon as possible for a visit in 1 week Follow up with her primary care for your blood pressure [id number]  [address]  [facility] [address]  [phone]      [facility] - Emergency Dept Emergency Medicine Go to  If symptoms worsen [phone]  [place] [id number] [phone]                 Elizabeth Hoyos PA-C  07/16/25 1753         [1]   Social History  Tobacco Use    Smoking status: Every Day     Current packs/day: 0.00     Types: Cigarettes     Last attempt to quit: 11/2/2021     Years since quitting: 3.7    Smokeless tobacco: Never   Substance Use Topics    Alcohol use: Yes    Drug use: Never        Leon Poole MD  07/16/25 1757

## 2025-07-16 NOTE — ED TRIAGE NOTES
Patient arrived to ED via EMS for complaints of a headache and back pain. Ted N/V and chest pain. Patient reports symptoms of SOB.

## 2025-07-17 LAB — HOLD SPECIMEN: NORMAL

## 2025-07-19 LAB — HOLD SPECIMEN: NORMAL
